# Patient Record
Sex: MALE | Race: WHITE
[De-identification: names, ages, dates, MRNs, and addresses within clinical notes are randomized per-mention and may not be internally consistent; named-entity substitution may affect disease eponyms.]

---

## 2017-02-21 ENCOUNTER — HOSPITAL ENCOUNTER (INPATIENT)
Dept: HOSPITAL 74 - YASAS | Age: 47
LOS: 4 days | Discharge: HOME | End: 2017-02-25
Attending: INTERNAL MEDICINE | Admitting: INTERNAL MEDICINE
Payer: COMMERCIAL

## 2017-02-21 VITALS — BODY MASS INDEX: 26.4 KG/M2

## 2017-02-21 DIAGNOSIS — R76.11: ICD-10-CM

## 2017-02-21 DIAGNOSIS — Z59.0: ICD-10-CM

## 2017-02-21 DIAGNOSIS — Z89.422: ICD-10-CM

## 2017-02-21 DIAGNOSIS — F10.230: Primary | ICD-10-CM

## 2017-02-21 PROCEDURE — HZ2ZZZZ DETOXIFICATION SERVICES FOR SUBSTANCE ABUSE TREATMENT: ICD-10-PCS | Performed by: INTERNAL MEDICINE

## 2017-02-21 RX ADMIN — Medication SCH MG: at 22:54

## 2017-02-21 SDOH — ECONOMIC STABILITY - HOUSING INSECURITY: HOMELESSNESS: Z59.0

## 2017-02-21 NOTE — HP
CIWA Score





- CIWA Score


Nausea/Vomitin-Mild Nausea/No Vomiting


Muscle Tremors: 4-Moderate,w/Arms Extend


Anxiety: 4-Mod. Anxious/Guarded


Agitation: 4-Moderately Restless


Paroxysmal Sweats: 3


Orientation: 2-Disoriented Date<2 days


Tacttile Disturbances: 0-None


Auditory Disturbances: 0-None


Visual Disturbances: 0-None


Headache: 2-Mild


CIWA-Ar Total Score: 20





Admission ROS [facility]





- HPI


Chief Complaint: 


WITHDRAWAL SX'S. SEEKING DETOX


Allergies/Adverse Reactions: 


 Allergies











Allergy/AdvReac Type Severity Reaction Status Date / Time


 


No Known Allergies Allergy   Verified 17 16:37











History of Present Illness: 


46 Y.O. MALE WITH ALCOHOLISM ADMITTED FOR DETOX TXMENT. CLIENT IS KNOWN TO 

[facility].  REPORTS LONGEST CLEAN TIME 10 YEARS.


Exam Limitations: No Limitations





- Ebola screening


Have you traveled outside of the country in the last 21 days: No


Have you had contact with anyone from an Ebola affected area: No


Have you been sick,other than usual withdrawal symptoms: No


Do you have a fever: No





- Review of Systems


Constitutional: Loss of Appetite, Malaise, Night Sweats, Unexplained wgt Loss


EENT: reports: No Symptoms Reported


Respiratory: reports: No Symptoms reported


Cardiac: reports: No Symptoms Reported


GI: reports: Nausea, Poor Appetite, Poor Fluid Intake


: reports: No Symptoms Reported


Musculoskeletal: reports: No Symptoms Reported


Integumentary: reports: No Symptoms Reported


Neuro: reports: Tremors


Endocrine: reports: No Symptoms Reported


Hematology: reports: No Symptoms Reported


Psychiatric: reports: Anxious


Other Systems: Reviewed and Negative





Patient History





- Patient Medical History


Hx Anemia: No


Hx Asthma: No


Hx Chronic Obstructive Pulmonary Disease (COPD): No


Hx Cancer: No


Hx Cardiac Disorders: No


Hx Congestive Heart Failure: No


Hx Hypertension: No


Hx Hypercholesterolemia: No


Hx Pacemaker: No


HX Cerebrovascular Accident: No


Hx Seizures: No


Hx Dementia: No


Hx Diabetes: No


Hx Gastrointestinal Disorders: No


Hx Liver Disease: No


Hx Genitourinary Disorders: No


Hx Sexually Transmitted Disorders: No


Hx Renal Disease (ESRD): No


Hx Thyroid Disease: No


Hx Human Immunodeficiency Virus (HIV): No


Hx Hepatitis C: No


Hx Depression: No


Hx Suicide Attempt: No


Hx Bipolar Disorder: No


Hx Schizophrenia: No


Other Medical History: DENIES





- Patient Surgical History


Past Surgical History: Yes


Hx Neurologic Surgery: No


Hx Cataract Extraction: No


Hx Cardiac Surgery: No


Hx Lung Surgery: No


Hx Breast Surgery: No


Hx Breast Biopsy: No


Hx Abdominal Surgery: No


Hx Appendectomy: No


Hx Cholecystectomy: No


Hx Genitourinary Surgery: No


Hx  Section: No


Hx Orthopedic Surgery: Yes (traumatic transmetatarsal amputation of left foot 

in  in [facility])


Hx Hysterectomy: No


Other Surgical History: h/o nasal and rt rib fx's in past 


Anesthesia Reaction: No





- PPD History


Previous Implant?: Yes


Documented Results: Positive w/o proof


Implanted On Prior [facility] Admission?: No


Results: positive


PPD to be Administered?: No





- Smoking Cessation


Smoking history: Never smoked


Have you smoked in the past 12 months: No


Aproximately how many cigarettes per day: 0


If you are a former smoker, when did you quit?: more than year ago


Cigars Per Day: 0


Hx Chewing Tobacco Use: No


Initiated information on smoking cessation: No





- Substance & Tx. History


Hx Alcohol Use: Yes


Hx Substance Use: No


Substance Use Type: Alcohol


Hx Substance Use Treatment: Yes ([facility])





- Substances Abused


  ** LIQUOR/BEER


Route: Oral


Frequency: Daily


Amount used: FIFTH/ 3-4 6 PACKS


Age of first use: 15


Date of Last Use: 18





Family Disease History





- Family Disease History


Family Disease History: Respiratory: Father (ALCOHOL in past), Other: Mother (

arthritis)





Admission Physical Exam [facility]





- Vital Signs


Vital Signs: 


 Vital Signs - 24 hr











  17





  13:13


 


Temperature 97.3 F L


 


Pulse Rate 111 H


 


Respiratory 20





Rate 


 


Blood Pressure 142/86














- Physical


General Appearance: Yes: Mild Distress, Alcohol on Breath, Intoxicated, 

Tremorous, Anxious


HEENTM: Yes: EOMI, Normocephalic, Normal Voice, SHANNON, Pharynx Normal


Respiratory: Yes: Lungs Clear, Normal Breath Sounds, No Respiratory Distress, 

No Accessory Muscle Use


Neck: Yes: No masses,lesions,Nodules, Supple, Trachea in good position


Breast: Yes: Breast Exam Deferred


Cardiology: Yes: Regular Rhythm, Regular Rate, S1, S2


Abdominal: Yes: Normal Bowel Sounds, Non Tender, Soft


Genitourinary: Yes: Within Normal Limits


Back: Yes: Normal Inspection


Musculoskeletal: Yes: full range of Motion, Gait Steady


Extremities: Yes: Normal Capillary Refill, Normal Range of Motion, Non-Tender, 

Tremors


Neurological: Yes: Alert, Motor Strength 5/5


Integumentary: Yes: Normal Color, Warm, Moist


Lymphatic: Yes: Within Normal Limits





- Diagnostic


(1) Positive PPD


Current Visit: Yes   Status: Chronic





(2) Alcohol dependence with uncomplicated withdrawal


Current Visit: Yes   Status: Chronic








Cleared for Admission [facility]





- Detox or Rehab


[facility] Level of Care: Medically Managed


Detox Regimen/Protocol: Librium





BHS Breath Alcohol Content


Breath Alcohol Content: 0.119





Urine Drug Screen





- Results


Drug Screen Negative: No


Urine Drug Screen Results: BZO-Benzodiazepines

## 2017-02-22 LAB
ALBUMIN SERPL-MCNC: 3.9 G/DL (ref 3.4–5)
ALP SERPL-CCNC: 66 U/L (ref 45–117)
ALT SERPL-CCNC: 54 U/L (ref 12–78)
ANION GAP SERPL CALC-SCNC: 8 MMOL/L (ref 8–16)
APPEARANCE UR: CLEAR
AST SERPL-CCNC: 35 U/L (ref 15–37)
BILIRUB SERPL-MCNC: 0.3 MG/DL (ref 0.2–1)
BILIRUB UR STRIP.AUTO-MCNC: NEGATIVE MG/DL
CALCIUM SERPL-MCNC: 8.8 MG/DL (ref 8.5–10.1)
CO2 SERPL-SCNC: 29 MMOL/L (ref 21–32)
COLOR UR: (no result)
CREAT SERPL-MCNC: 1.2 MG/DL (ref 0.7–1.3)
DEPRECATED RDW RBC AUTO: 13.9 % (ref 11.9–15.9)
GLUCOSE SERPL-MCNC: 83 MG/DL (ref 74–106)
KETONES UR QL STRIP: NEGATIVE
LEUKOCYTE ESTERASE UR QL STRIP.AUTO: NEGATIVE
MCH RBC QN AUTO: 29 PG (ref 25.7–33.7)
MCHC RBC AUTO-ENTMCNC: 33.3 G/DL (ref 32–35.9)
MCV RBC: 86.9 FL (ref 80–96)
NITRITE UR QL STRIP: NEGATIVE
PH UR: 6 [PH] (ref 5–8)
PLATELET # BLD AUTO: 232 K/MM3 (ref 134–434)
PMV BLD: 7.4 FL (ref 7.5–11.1)
PROT SERPL-MCNC: 7.9 G/DL (ref 6.4–8.2)
PROT UR QL STRIP: NEGATIVE
PROT UR QL STRIP: NEGATIVE
RBC # UR STRIP: NEGATIVE /UL
SP GR UR: 1.01 (ref 1–1.03)
UROBILINOGEN UR STRIP-MCNC: NEGATIVE E.U./DL (ref 0.2–1)
WBC # BLD AUTO: 5.2 K/MM3 (ref 4–10)

## 2017-02-22 RX ADMIN — LOPERAMIDE HYDROCHLORIDE PRN MG: 2 CAPSULE ORAL at 19:56

## 2017-02-22 RX ADMIN — HYDROXYZINE PAMOATE PRN MG: 50 CAPSULE ORAL at 00:31

## 2017-02-22 RX ADMIN — LOPERAMIDE HYDROCHLORIDE PRN MG: 2 CAPSULE ORAL at 13:05

## 2017-02-22 RX ADMIN — HYDROXYZINE PAMOATE PRN MG: 50 CAPSULE ORAL at 11:59

## 2017-02-22 RX ADMIN — Medication SCH TAB: at 10:05

## 2017-02-22 NOTE — CONSULT
[facility] Psychiatric Consult





- Data


Date of interview: 02/22/17


Admission source: [facility]


Identifying data: This is another admission to [facility] for this 47 y/o 

 male seeking detox treatment on [address] for alcohol dependence.Patient 

is single,a father of one,domiciled,unemployed and supported on Public 

Assistance.


Substance Abuse History: - Smoking Cessation.  Smoking history: Never smoked.  

Have you smoked in the past 12 months: No.  Aproximately how many cigarettes 

per day: 0.  If you are a former smoker, when did you quit?: more than year 

ago.  Cigars Per Day: 0.  Hx Chewing Tobacco Use: No.  Initiated information on 

smoking cessation: No.  - Substance & Tx. History.  Hx Alcohol Use: Yes.  Hx 

Substance Use: No.  Substance Use Type: Alcohol.  Hx Substance Use Treatment: 

Yes ([facility]).  - Substances Abused.  ** LIQUOR/BEER.  Route: Oral.  Frequency: 

Daily.  Amount used: FIFTH/ 3-4 6 PACKS.  Age of first use: 15.  Date of Last 

Use: 02/21/17.  Confirmed by the patient in this interview.


Medical History: Patient endorses good general health.Noted history of 

orthopedic surgery (traumatic transmetatarsal amputation of left foot in 1991).


Psychiatric History: No history of psychiatric hospitalizations.Only CPEP 

visits for intoxicated states (alcohol) and release after a few hours of 

observation.Patient denies taking any medications at this time.No contact with 

any OPD care providers.Mr Rios denies history of suicide attempts.


Physical/Sexual Abuse/Trauma History: No history of sexual abuse.


Additional Comment: Urine Drug Screen Results: BZO-Benzodiazepines.Noted.





Mental Status Exam





- Mental Status Exam


Alert and Oriented to: Time, Place, Person


Cognitive Function: Good


Patient Appearance: Well Groomed


Mood: Hopeful, Euthymic


Affect: Appropriate, Normal Range


Patient Behavior: Appropriate, Cooperative


Speech Pattern: Clear


Voice Loudness: Normal


Thought Process: Intact


Thought Disorder: Not Present


Hallucinations: None


Suicidal Ideation: None


Homicidal Ideation: None


Insight/Judgement: Fair


Sleep: Well


Appetite: Good


Muscle strength/Tone: Normal


Gait/Station: Normal





Psychiatric Findings





- Problem List (Axis 1, 2,3)


(1) Alcohol dependence with uncomplicated withdrawal


Current Visit: Yes   Status: Acute








- Initial Treatment Plan


Initial Treatment Plan: Psychoeducation.Detoxification.Observation.

## 2017-02-22 NOTE — EKG
Test Reason : 

Blood Pressure : ***/*** mmHG

Vent. Rate : 089 BPM     Atrial Rate : 089 BPM

   P-R Int : 136 ms          QRS Dur : 104 ms

    QT Int : 356 ms       P-R-T Axes : 065 069 040 degrees

   QTc Int : 433 ms

 

NORMAL SINUS RHYTHM

NORMAL ECG

NO PREVIOUS ECGS AVAILABLE

Confirmed by RITCHIE HANCOCK MD ([id number]) on 2/22/2017 4:11:23 PM

 

Referred By:             Confirmed By:RITCHIE HANCOCK MD

## 2017-02-22 NOTE — PN
S CIWA





- CIWA Score


Nausea/Vomitin-No Nausea/No Vomiting


Muscle Tremors: 4-Moderate,w/Arms Extend


Anxiety: 4-Mod. Anxious/Guarded


Agitation: 3


Paroxysmal Sweats: 3


Orientation: 0-Oriented


Tacttile Disturbances: 0-None


Auditory Disturbances: 0-None


Visual Disturbances: 0-None


Headache: 0-None Present


CIWA-Ar Total Score: 14





BHS Progress Note (SOAP)


Subjective: 


Sweating,anxiety,tremors,interrupted sleep,restless


Objective: 





17 17:17


 Vital Signs - 8 hr











  17





  13:37 17:13


 


Temperature 97.3 F L 98.1 F


 


Pulse Rate 95 H 74


 


Respiratory 20 18





Rate  


 


Blood Pressure 138/83 127/75








 Laboratory Tests











  17





  07:50 07:50 07:50


 


WBC  5.2  


 


RBC  4.76  


 


Hgb  13.8  


 


Hct  41.4  


 


MCV  86.9  


 


MCHC  33.3  


 


RDW  13.9  


 


Plt Count  232  


 


MPV  7.4 L D  


 


Sodium   144 


 


Potassium   4.0 


 


Chloride   107 


 


Carbon Dioxide   29 


 


Anion Gap   8 


 


BUN   11  D 


 


Creatinine   1.2 


 


Creat Clearance w eGFR   > 60 


 


Random Glucose   83 


 


Calcium   8.8 


 


Total Bilirubin   0.3  D 


 


AST   35 


 


ALT   54 


 


Alkaline Phosphatase   66 


 


Total Protein   7.9 


 


Albumin   3.9 


 


RPR Titer    Nonreactive








labs noted


Assessment: 





17 17:17


Withdrawal sx.


Plan: 


Continue detox

## 2017-02-23 RX ADMIN — LOPERAMIDE HYDROCHLORIDE PRN MG: 2 CAPSULE ORAL at 17:31

## 2017-02-23 RX ADMIN — HYDROXYZINE PAMOATE PRN MG: 50 CAPSULE ORAL at 22:04

## 2017-02-23 RX ADMIN — LOPERAMIDE HYDROCHLORIDE PRN MG: 2 CAPSULE ORAL at 05:33

## 2017-02-23 RX ADMIN — Medication SCH MG: at 22:01

## 2017-02-23 RX ADMIN — Medication SCH TAB: at 10:43

## 2017-02-23 NOTE — PN
S CIWA





- CIWA Score


Nausea/Vomitin-No Nausea/No Vomiting


Muscle Tremors: 4-Moderate,w/Arms Extend


Anxiety: 4-Mod. Anxious/Guarded


Agitation: 3


Paroxysmal Sweats: 3


Orientation: 0-Oriented


Tacttile Disturbances: 1-Very Mild Itch/Numbness


Auditory Disturbances: 0-None


Visual Disturbances: 0-None


Headache: 0-None Present


CIWA-Ar Total Score: 15





BHS Progress Note (SOAP)


Subjective: 


Sweating,nausea,anxiety,tremors,interrupted sleep,restless.


Objective: 





17 12:39


 Vital Signs - 8 hr











  17





  06:41 09:44


 


Temperature 98.2 F 96 F L


 


Pulse Rate 64 85


 


Respiratory 16 18





Rate  


 


Blood Pressure 112/71 132/89








 Laboratory Tests











  17





  07:50 07:50 07:50


 


WBC  5.2  


 


RBC  4.76  


 


Hgb  13.8  


 


Hct  41.4  


 


MCV  86.9  


 


MCHC  33.3  


 


RDW  13.9  


 


Plt Count  232  


 


MPV  7.4 L D  


 


Sodium   144 


 


Potassium   4.0 


 


Chloride   107 


 


Carbon Dioxide   29 


 


Anion Gap   8 


 


BUN   11  D 


 


Creatinine   1.2 


 


Creat Clearance w eGFR   > 60 


 


Random Glucose   83 


 


Calcium   8.8 


 


Total Bilirubin   0.3  D 


 


AST   35 


 


ALT   54 


 


Alkaline Phosphatase   66 


 


Total Protein   7.9 


 


Albumin   3.9 


 


Urine Color   


 


Urine Appearance   


 


Urine pH   


 


Ur Specific Gravity   


 


Urine Protein   


 


Urine Glucose (UA)   


 


Urine Ketones   


 


Urine Blood   


 


Urine Nitrite   


 


Urine Bilirubin   


 


Urine Urobilinogen   


 


Ur Leukocyte Esterase   


 


RPR Titer    Nonreactive














  17





  16:30


 


WBC 


 


RBC 


 


Hgb 


 


Hct 


 


MCV 


 


MCHC 


 


RDW 


 


Plt Count 


 


MPV 


 


Sodium 


 


Potassium 


 


Chloride 


 


Carbon Dioxide 


 


Anion Gap 


 


BUN 


 


Creatinine 


 


Creat Clearance w eGFR 


 


Random Glucose 


 


Calcium 


 


Total Bilirubin 


 


AST 


 


ALT 


 


Alkaline Phosphatase 


 


Total Protein 


 


Albumin 


 


Urine Color  Ltyellow


 


Urine Appearance  Clear


 


Urine pH  6.0


 


Ur Specific Gravity  1.012


 


Urine Protein  Negative


 


Urine Glucose (UA)  Negative


 


Urine Ketones  Negative


 


Urine Blood  Negative


 


Urine Nitrite  Negative


 


Urine Bilirubin  Negative


 


Urine Urobilinogen  Negative


 


Ur Leukocyte Esterase  Negative


 


RPR Titer 








labs noted


Assessment: 





17 12:39


Withdrawal sx.


Plan: 


Continue detox

## 2017-02-24 RX ADMIN — Medication SCH MG: at 22:21

## 2017-02-24 RX ADMIN — Medication SCH TAB: at 10:38

## 2017-02-24 RX ADMIN — LOPERAMIDE HYDROCHLORIDE PRN MG: 2 CAPSULE ORAL at 10:38

## 2017-02-24 NOTE — PN
BHS Progress Note (SOAP)


Subjective: 


Sweating,interrupted sleep,restless


Objective: 





02/24/17 10:57


 Vital Signs - 8 hr











  02/24/17 02/24/17 02/24/17





  03:30 06:32 10:35


 


Temperature  96.8 F L 95.8 F L


 


Pulse Rate  78 82


 


Respiratory 18 18 20





Rate   


 


Blood Pressure  136/88 141/93








 Laboratory Tests











  02/22/17 02/22/17 02/22/17





  07:50 07:50 07:50


 


WBC  5.2  


 


RBC  4.76  


 


Hgb  13.8  


 


Hct  41.4  


 


MCV  86.9  


 


MCHC  33.3  


 


RDW  13.9  


 


Plt Count  232  


 


MPV  7.4 L D  


 


Sodium   144 


 


Potassium   4.0 


 


Chloride   107 


 


Carbon Dioxide   29 


 


Anion Gap   8 


 


BUN   11  D 


 


Creatinine   1.2 


 


Creat Clearance w eGFR   > 60 


 


Random Glucose   83 


 


Calcium   8.8 


 


Total Bilirubin   0.3  D 


 


AST   35 


 


ALT   54 


 


Alkaline Phosphatase   66 


 


Total Protein   7.9 


 


Albumin   3.9 


 


Urine Color   


 


Urine Appearance   


 


Urine pH   


 


Ur Specific Gravity   


 


Urine Protein   


 


Urine Glucose (UA)   


 


Urine Ketones   


 


Urine Blood   


 


Urine Nitrite   


 


Urine Bilirubin   


 


Urine Urobilinogen   


 


Ur Leukocyte Esterase   


 


RPR Titer    Nonreactive














  02/22/17





  16:30


 


WBC 


 


RBC 


 


Hgb 


 


Hct 


 


MCV 


 


MCHC 


 


RDW 


 


Plt Count 


 


MPV 


 


Sodium 


 


Potassium 


 


Chloride 


 


Carbon Dioxide 


 


Anion Gap 


 


BUN 


 


Creatinine 


 


Creat Clearance w eGFR 


 


Random Glucose 


 


Calcium 


 


Total Bilirubin 


 


AST 


 


ALT 


 


Alkaline Phosphatase 


 


Total Protein 


 


Albumin 


 


Urine Color  Ltyellow


 


Urine Appearance  Clear


 


Urine pH  6.0


 


Ur Specific Gravity  1.012


 


Urine Protein  Negative


 


Urine Glucose (UA)  Negative


 


Urine Ketones  Negative


 


Urine Blood  Negative


 


Urine Nitrite  Negative


 


Urine Bilirubin  Negative


 


Urine Urobilinogen  Negative


 


Ur Leukocyte Esterase  Negative


 


RPR Titer 








labs noted


Assessment: 





02/24/17 10:58


Withdrawal sx.


Plan: 


Continue detox

## 2017-02-25 VITALS — DIASTOLIC BLOOD PRESSURE: 69 MMHG | SYSTOLIC BLOOD PRESSURE: 106 MMHG | TEMPERATURE: 97.2 F | HEART RATE: 80 BPM

## 2017-02-25 NOTE — DS
[facility]HS Detox Discharge Summary


Admission Date: 


02/21/17





Discharge Date: 02/25/17





- History


Present History: Alcohol Dependence


Pertinent Past History: 


PPD +





- Physical Exam Results


Vital Signs: 


 Vital Signs











Temperature  97.2 F L  02/25/17 06:45


 


Pulse Rate  80   02/25/17 06:45


 


Respiratory Rate  18   02/25/17 06:45


 


Blood Pressure  106/69   02/25/17 06:45


 


O2 Sat by Pulse Oximetry (%)      








 Laboratory Last Values











WBC  5.2 K/mm3 (4.0-10.0)   02/22/17  07:50    


 


RBC  4.76 M/mm3 (4.00-5.60)   02/22/17  07:50    


 


Hgb  13.8 GM/dL (11.7-16.9)   02/22/17  07:50    


 


Hct  41.4 % (35.4-49)   02/22/17  07:50    


 


MCV  86.9 fl (80-96)   02/22/17  07:50    


 


MCHC  33.3 g/dl (32.0-35.9)   02/22/17  07:50    


 


RDW  13.9 % (11.9-15.9)   02/22/17  07:50    


 


Plt Count  232 K/MM3 (134-434)   02/22/17  07:50    


 


MPV  7.4 fl (7.5-11.1)  L D 02/22/17  07:50    


 


Sodium  144 mmol/L (136-145)   02/22/17  07:50    


 


Potassium  4.0 mmol/L (3.5-5.1)   02/22/17  07:50    


 


Chloride  107 mmol/L ()   02/22/17  07:50    


 


Carbon Dioxide  29 mmol/L (21-32)   02/22/17  07:50    


 


Anion Gap  8  (8-16)   02/22/17  07:50    


 


BUN  11 mg/dL (7-18)  D 02/22/17  07:50    


 


Creatinine  1.2 mg/dL (0.7-1.3)   02/22/17  07:50    


 


Creat Clearance w eGFR  > 60  (>60)   02/22/17  07:50    


 


Random Glucose  83 mg/dL ()   02/22/17  07:50    


 


Calcium  8.8 mg/dL (8.5-10.1)   02/22/17  07:50    


 


Total Bilirubin  0.3 mg/dL (0.2-1.0)  D 02/22/17  07:50    


 


AST  35 U/L (15-37)   02/22/17  07:50    


 


ALT  54 U/L (12-78)   02/22/17  07:50    


 


Alkaline Phosphatase  66 U/L ()   02/22/17  07:50    


 


Total Protein  7.9 g/dl (6.4-8.2)   02/22/17  07:50    


 


Albumin  3.9 g/dl (3.4-5.0)   02/22/17  07:50    


 


Urine Color  Ltyellow   02/22/17  16:30    


 


Urine Appearance  Clear   02/22/17  16:30    


 


Urine pH  6.0  (5.0-8.0)   02/22/17  16:30    


 


Ur Specific Gravity  1.012  (1.001-1.035)   02/22/17  16:30    


 


Urine Protein  Negative  (NEGATIVE)   02/22/17  16:30    


 


Urine Glucose (UA)  Negative  (NEGATIVE)   02/22/17  16:30    


 


Urine Ketones  Negative  (NEGATIVE)   02/22/17  16:30    


 


Urine Blood  Negative  (NEGATIVE)   02/22/17  16:30    


 


Urine Nitrite  Negative  (NEGATIVE)   02/22/17  16:30    


 


Urine Bilirubin  Negative  (NEGATIVE)   02/22/17  16:30    


 


Urine Urobilinogen  Negative E.U./dl (0.2-1.0)   02/22/17  16:30    


 


Ur Leukocyte Esterase  Negative  (NEGATIVE)   02/22/17  16:30    


 


RPR Titer  Nonreactive  (NONREACTIVE)   02/22/17  07:50    








labs noted


Pertinent Admission Physical Exam Findings: 


withdrawal symptoms





- Treatment


Hospital Course: Detox Protocol Followed, Detoxed Safely, Responded well, 

Discharged Condition Good





- Medication


Discharge Medications: 


Ambulatory Orders





NK [No Known Home Medication]  08/03/15 











- Diagnosis


(1) Alcohol dependence with uncomplicated withdrawal


Status: Acute





(2) Positive PPD


Status: Chronic








- AMA


Did Patient Leave Against Medical Advice: No

## 2017-05-04 ENCOUNTER — HOSPITAL ENCOUNTER (INPATIENT)
Dept: HOSPITAL 74 - YASAS | Age: 47
LOS: 4 days | Discharge: HOME | End: 2017-05-08
Attending: INTERNAL MEDICINE | Admitting: INTERNAL MEDICINE
Payer: COMMERCIAL

## 2017-05-04 VITALS — BODY MASS INDEX: 25.8 KG/M2

## 2017-05-04 DIAGNOSIS — F19.24: ICD-10-CM

## 2017-05-04 DIAGNOSIS — R55: ICD-10-CM

## 2017-05-04 DIAGNOSIS — R63.4: ICD-10-CM

## 2017-05-04 DIAGNOSIS — F14.10: ICD-10-CM

## 2017-05-04 DIAGNOSIS — F10.230: Primary | ICD-10-CM

## 2017-05-04 DIAGNOSIS — F17.210: ICD-10-CM

## 2017-05-04 DIAGNOSIS — Z89.432: ICD-10-CM

## 2017-05-04 DIAGNOSIS — F39: ICD-10-CM

## 2017-05-04 DIAGNOSIS — F13.230: ICD-10-CM

## 2017-05-04 DIAGNOSIS — F32.9: ICD-10-CM

## 2017-05-04 DIAGNOSIS — F31.9: ICD-10-CM

## 2017-05-04 DIAGNOSIS — R76.11: ICD-10-CM

## 2017-05-04 LAB
APPEARANCE UR: CLEAR
BILIRUB UR STRIP.AUTO-MCNC: NEGATIVE MG/DL
COLOR UR: (no result)
KETONES UR QL STRIP: NEGATIVE
LEUKOCYTE ESTERASE UR QL STRIP.AUTO: NEGATIVE
NITRITE UR QL STRIP: NEGATIVE
PH UR: 6 [PH] (ref 5–8)
PROT UR QL STRIP: NEGATIVE
PROT UR QL STRIP: NEGATIVE
RBC # UR STRIP: NEGATIVE /UL
UROBILINOGEN UR STRIP-MCNC: NEGATIVE E.U./DL (ref 0.2–1)

## 2017-05-04 RX ADMIN — Medication SCH MG: at 22:44

## 2017-05-04 NOTE — HP
CIWA Score





- CIWA Score


Nausea/Vomitin-Mild Nausea/No Vomiting


Muscle Tremors: 4-Moderate,w/Arms Extend


Anxiety: 4-Mod. Anxious/Guarded


Agitation: 4-Moderately Restless


Paroxysmal Sweats: 1-Minimal Palms Moist


Orientation: 1-Uncertain about Date


Tacttile Disturbances: 0-None


Auditory Disturbances: 0-None


Visual Disturbances: 0-None


Headache: 0-None Present


CIWA-Ar Total Score: 15





Admission ROS [facility]





- HPI


Chief Complaint: 


withdrawal sx


Allergies/Adverse Reactions: 


 Allergies











Allergy/AdvReac Type Severity Reaction Status Date / Time


 


No Known Allergies Allergy   Verified 17 16:37











History of Present Illness: 


46 years old male with long history of alcohol dependence has positive ppd and 

bipolar ii is admitted to detox


Exam Limitations: No Limitations





- Ebola screening


Have you traveled outside of the country in the last 21 days: No


Have you had contact with anyone from an Ebola affected area: No


Have you been sick,other than usual withdrawal symptoms: No


Do you have a fever: No





- Review of Systems


Constitutional: Chills, Changes in sleep, Weight Stable


EENT: reports: No Symptoms Reported


Respiratory: reports: No Symptoms reported


Cardiac: reports: No Symptoms Reported


GI: reports: Poor Fluid Intake, Abdominal cramping


: reports: No Symptoms Reported


Musculoskeletal: reports: No Symptoms Reported


Integumentary: reports: No Symptoms Reported


Neuro: reports: Tremors


Endocrine: reports: No Symptoms Reported


Hematology: reports: No Symptoms Reported


Psychiatric: reports: Judgement Intact, Anxious, Depressed


Other Systems: Reviewed and Negative





Patient History





- Patient Medical History


Hx Anemia: No


Hx Asthma: No


Hx Chronic Obstructive Pulmonary Disease (COPD): No


Hx Cancer: No


Hx Cardiac Disorders: No


Hx Congestive Heart Failure: No


Hx Hypertension: No


Hx Hypercholesterolemia: No


Hx Pacemaker: No


HX Cerebrovascular Accident: No


Hx Seizures: No


Hx Dementia: No


Hx Diabetes: No


Hx Gastrointestinal Disorders: No


Hx Liver Disease: No


Hx Genitourinary Disorders: No


Hx Sexually Transmitted Disorders: No


Hx Renal Disease (ESRD): No


Hx Thyroid Disease: No


Hx Human Immunodeficiency Virus (HIV): No


Hx Hepatitis C: No


Hx Depression: No


Hx Suicide Attempt: No


Hx Bipolar Disorder: Yes


Hx Schizophrenia: No





- Patient Surgical History


Past Surgical History: Yes


Hx Neurologic Surgery: No


Hx Cataract Extraction: No


Hx Cardiac Surgery: No


Hx Lung Surgery: No


Hx Breast Surgery: No


Hx Breast Biopsy: No


Hx Abdominal Surgery: No


Hx Appendectomy: No


Hx Cholecystectomy: No


Hx Genitourinary Surgery: No


Hx Orthopedic Surgery: Yes (traumatic transmetatarsal amputation of left foot 

in  in [facility])


Other Surgical History: h/o nasal and rt rib fx's in past 


Anesthesia Reaction: No





- PPD History


Previous Implant?: Yes


Documented Results: Positive w/o proof


Implanted On Prior R Admission?: No


Results: positive


PPD to be Administered?: No





- Smoking Cessation


Smoking history: Never smoked


Have you smoked in the past 12 months: No


Aproximately how many cigarettes per day: 0


If you are a former smoker, when did you quit?: more than year ago


Cigars Per Day: 0


Hx Chewing Tobacco Use: No


Initiated information on smoking cessation: No





- Substance & Tx. History


Hx Alcohol Use: Yes


Hx Substance Use: No


Substance Use Type: Alcohol


Hx Substance Use Treatment: Yes





- Substances Abused


  ** Alcohol


Route: Oral


Frequency: Daily


Amount used: 2 pints vodka/ 2 6pks beer and up


Age of first use: 15


Date of Last Use: 18





Family Disease History





- Family Disease History


Family Disease History: Respiratory: Father (ALCOHOL in past), Other: Mother (

arthritis)





Admission Physical Exam [facility]





- Vital Signs


Vital Signs: 


 Vital Signs - 24 hr











  17





  12:01


 


Temperature 96.8 F L


 


Pulse Rate 112 H


 


Respiratory 20





Rate 


 


Blood Pressure 135/90














- Physical


General Appearance: Yes: Nourished, Appropriately Dressed, Mild Distress (

patient took benzo at home to avoid alcohol withdrawal sx), Alcohol on Breath, 

Tremorous, Irritable, Sweating, Anxious


HEENTM: Yes: Hearing grossly Normal, Normal ENT Inspection, Normocephalic, 

Normal Voice


Respiratory: Yes: Chest Non-Tender, Lungs Clear, Normal Breath Sounds, No 

Respiratory Distress, No Accessory Muscle Use


Neck: Yes: Supple, Trachea in good position


Cardiology: Yes: Regular Rhythm, S1, S2, Tachycardia


Abdominal: Yes: Non Tender, Soft


Genitourinary: Yes: Within Normal Limits


Back: Yes: Normal Inspection


Extremities: Yes: Normal Inspection, Normal Range of Motion, Non-Tender, Tremors


Neurological: Yes: Alert, Motor Strength 5/5, Normal Response, Depressed Affect


Integumentary: Yes: Warm


Lymphatic: Yes: Within Normal Limits





- Diagnostic


(1) Alcohol dependence with uncomplicated withdrawal


Current Visit: Yes   Status: Acute





(2) S/P transmetatarsal amputation of foot


Current Visit: Yes   Status: Resolved   Qualifiers: 


     Laterality: left        Qualified Code(s): Z89.432 - Acquired absence of 

left foot  





(3) Positive PPD


Current Visit: Yes   Status: Resolved





(4) Bipolar II disorder


Current Visit: Yes   Status: Suspected








Cleared for Admission [facility]





- Detox or Rehab


[facility] Level of Care: Medically Managed


Detox Regimen/Protocol: Librium





[facility]HS Breath Alcohol Content


Breath Alcohol Content: 0.154





Urine Drug Screen





- Results


Drug Screen Negative: No


Urine Drug Screen Results: BZO-Benzodiazepines

## 2017-05-05 LAB
ALBUMIN SERPL-MCNC: 4.2 G/DL (ref 3.4–5)
ALP SERPL-CCNC: 84 U/L (ref 45–117)
ALT SERPL-CCNC: 88 U/L (ref 12–78)
ANION GAP SERPL CALC-SCNC: 12 MMOL/L (ref 8–16)
AST SERPL-CCNC: 58 U/L (ref 15–37)
BILIRUB SERPL-MCNC: 0.5 MG/DL (ref 0.2–1)
CALCIUM SERPL-MCNC: 9.6 MG/DL (ref 8.5–10.1)
CO2 SERPL-SCNC: 25 MMOL/L (ref 21–32)
COCKROFT - GAULT: 92.49
CREAT SERPL-MCNC: 1.1 MG/DL (ref 0.7–1.3)
DEPRECATED RDW RBC AUTO: 14.2 % (ref 11.9–15.9)
GLUCOSE SERPL-MCNC: 118 MG/DL (ref 74–106)
HIV 1 & 2 AB: NEGATIVE
HIV 1 AGP24: NEGATIVE
MCH RBC QN AUTO: 29.3 PG (ref 25.7–33.7)
MCHC RBC AUTO-ENTMCNC: 33.9 G/DL (ref 32–35.9)
MCV RBC: 86.4 FL (ref 80–96)
PLATELET # BLD AUTO: 253 K/MM3 (ref 134–434)
PMV BLD: 8.3 FL (ref 7.5–11.1)
PROT SERPL-MCNC: 8.3 G/DL (ref 6.4–8.2)
SP GR UR: 1.01 (ref 1–1.03)
WBC # BLD AUTO: 5.9 K/MM3 (ref 4–10)

## 2017-05-05 RX ADMIN — LOPERAMIDE HYDROCHLORIDE PRN MG: 2 CAPSULE ORAL at 22:59

## 2017-05-05 RX ADMIN — Medication SCH MG: at 22:59

## 2017-05-05 RX ADMIN — Medication SCH TAB: at 10:17

## 2017-05-05 RX ADMIN — LOPERAMIDE HYDROCHLORIDE PRN MG: 2 CAPSULE ORAL at 15:29

## 2017-05-05 NOTE — EKG
Test Reason : 

Blood Pressure : ***/*** mmHG

Vent. Rate : 114 BPM     Atrial Rate : 114 BPM

   P-R Int : 122 ms          QRS Dur : 090 ms

    QT Int : 322 ms       P-R-T Axes : 063 092 037 degrees

   QTc Int : 443 ms

 

SINUS TACHYCARDIA

RIGHTWARD AXIS

WHEN COMPARED WITH ECG OF 04-MAY-2017 17:10,

NO SIGNIFICANT CHANGE WAS FOUND

Confirmed by HALEY CLARKE MD ([id number]) on 5/5/2017 11:42:18 AM

 

Referred By:             Confirmed By:HALEY CLARKE MD

## 2017-05-05 NOTE — CONSULT
[facility] Psychiatric Consult





- Data


Date of interview: 05/05/17


Admission source: [facility]


Identifying data: Readmission to [facility] for this 47 y/o [place] citizen-born male 

seeking detox treatment,on [address],for alcohol dependence.Patient is ,

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


Substance Abuse History: - Smoking Cessation.  Smoking history: Never smoked.  

Have you smoked in the past 12 months: No.  Aproximately how many cigarettes 

per day: 0.  If you are a former smoker, when did you quit?: more than year 

ago.  Cigars Per Day: 0.  Hx Chewing Tobacco Use: No.  Initiated information on 

smoking cessation: No.  - Substance & Tx. History.  Hx Alcohol Use: Yes.  Hx 

Substance Use: No.  Substance Use Type: Alcohol.  Hx Substance Use Treatment: 

Yes.  - Substances Abused.  ** Alcohol.  Route: Oral.  Frequency: Daily.  

Amount used: 2 pints vodka/ 2 6pks beer and up.  Age of first use: 15.  Date of 

Last Use: 05/04/17.  Confirmed by patient.


Medical History: Patient endorses good general health.Noted history of 

orthopedic surgery (traumatic transmetatarsal amputation of left foot in 1991).


Psychiatric History: No changes in longitudinal history : never hospitalized in 

a psychiatric institution.Only CPEP visits for intoxicated states (alcohol) and 

release after a few hours of observation.No maintenance care with psychotropic 

medications.No contact with OPD care providers.Mr Rios denies history of 

suicide attempts.


Physical/Sexual Abuse/Trauma History: Patient denies.


Additional Comment: Urine Drug Screen Results: BZO-Benzodiazepines.Noted.





Mental Status Exam





- Mental Status Exam


Alert and Oriented to: Time, Place, Person


Cognitive Function: Good


Patient Appearance: Well Groomed


Mood: Hopeful, Euthymic


Affect: Appropriate, Normal Range


Patient Behavior: Fatigued, Appropriate, Cooperative


Speech Pattern: Clear, Appropriate


Voice Loudness: Normal


Thought Process: Intact, Goal Oriented


Thought Disorder: Not Present


Hallucinations: Denies


Suicidal Ideation: Denies


Homicidal Ideation: Denies


Insight/Judgement: Poor


Sleep: Well


Appetite: Good


Muscle strength/Tone: Normal


Gait/Station: Normal





Psychiatric Findings





- Problem List (Axis 1, 2,3)


(1) Alcohol dependence with uncomplicated withdrawal


Current Visit: Yes   Status: Acute





(2) Alcohol-induced mood disorder


Current Visit: Yes   Status: Acute





(3) Positive PPD


Current Visit: Yes   Status: Resolved





(4) Status post transmetatarsal amputation of left foot


Current Visit: No   Status: Chronic








- Initial Treatment Plan


Initial Treatment Plan: Psychoeducation.Detoxification.Observation.

## 2017-05-05 NOTE — PN
S CIWA





- CIWA Score


Nausea/Vomitin-No Nausea/No Vomiting


Muscle Tremors: 3


Anxiety: 3


Agitation: 3


Paroxysmal Sweats: 3


Orientation: 0-Oriented


Tacttile Disturbances: 0-None


Auditory Disturbances: 0-None


Visual Disturbances: 0-None


Headache: 1-Very Mild


CIWA-Ar Total Score: 13





BHS Progress Note (SOAP)


Subjective: 


anxious


sweats


interrupted sleep


headache


Objective: 





17 10:46


 Vital Signs











Temperature  97.7 F   17 10:46


 


Pulse Rate  93 H  17 10:46


 


Respiratory Rate  20   17 10:46


 


Blood Pressure  132/87   17 10:46


 


O2 Sat by Pulse Oximetry (%)      








 Laboratory Tests











  17





  22:55 06:00


 


WBC   5.9


 


RBC   4.66


 


Hgb   13.7


 


Hct   40.3


 


MCV   86.4


 


MCHC   33.9


 


RDW   14.2


 


Plt Count   253


 


MPV   8.3  D


 


Urine Color  Ltyellow 


 


Urine Appearance  Clear 


 


Urine pH  6.0 


 


Urine Protein  Negative 


 


Urine Glucose (UA)  Negative 


 


Urine Ketones  Negative 


 


Urine Blood  Negative 


 


Urine Nitrite  Negative 


 


Urine Bilirubin  Negative 


 


Urine Urobilinogen  Negative 


 


Ur Leukocyte Esterase  Negative 








labs pending


awake/alert


ambulating


no acute distress


Assessment: 





17 10:47


withdrawal sx


Plan: 


continue detox


increase fluids


labs pending

## 2017-05-05 NOTE — EKG
Test Reason : 

Blood Pressure : ***/*** mmHG

Vent. Rate : 123 BPM     Atrial Rate : 123 BPM

   P-R Int : 126 ms          QRS Dur : 090 ms

    QT Int : 314 ms       P-R-T Axes : 065 105 029 degrees

   QTc Int : 449 ms

 

*** POOR DATA QUALITY, INTERPRETATION MAY BE ADVERSELY AFFECTED

SINUS TACHYCARDIA

RIGHTWARD AXIS

WHEN COMPARED WITH ECG OF 04-MAY-2017 17:10,

NO SIGNIFICANT CHANGE WAS FOUND

Confirmed by HALEY CLARKE MD ([id number]) on 5/5/2017 11:42:38 AM

 

Referred By:             Confirmed By:HALEY CLARKE MD

## 2017-05-06 RX ADMIN — Medication SCH TAB: at 10:20

## 2017-05-06 RX ADMIN — LOPERAMIDE HYDROCHLORIDE PRN MG: 2 CAPSULE ORAL at 05:53

## 2017-05-06 RX ADMIN — Medication SCH MG: at 22:10

## 2017-05-06 RX ADMIN — LOPERAMIDE HYDROCHLORIDE PRN MG: 2 CAPSULE ORAL at 17:35

## 2017-05-06 NOTE — PN
BHS Progress Note (SOAP)


Subjective: 


Diarrhea, cramps, shakes and sweats


Objective: 


05/06/17 13:21


 Vital Signs - 8 hr











  05/06/17 05/06/17





  06:00 10:47


 


Temperature 97.9 F 97.5 F L


 


Pulse Rate 74 85


 


Respiratory 18 18





Rate  


 


Blood Pressure 121/66 141/88








 Laboratory Last Values











WBC  5.9 K/mm3 (4.0-10.0)   05/05/17  06:00    


 


RBC  4.66 M/mm3 (4.00-5.60)   05/05/17  06:00    


 


Hgb  13.7 GM/dL (11.7-16.9)   05/05/17  06:00    


 


Hct  40.3 % (35.4-49)   05/05/17  06:00    


 


MCV  86.4 fl (80-96)   05/05/17  06:00    


 


MCHC  33.9 g/dl (32.0-35.9)   05/05/17  06:00    


 


RDW  14.2 % (11.9-15.9)   05/05/17  06:00    


 


Plt Count  253 K/MM3 (134-434)   05/05/17  06:00    


 


MPV  8.3 fl (7.5-11.1)  D 05/05/17  06:00    


 


Sodium  138 mmol/L (136-145)   05/05/17  06:00    


 


Potassium  5.5 mmol/L (3.5-5.1)  H D 05/05/17  06:00    


 


Chloride  101 mmol/L ()   05/05/17  06:00    


 


Carbon Dioxide  25 mmol/L (21-32)   05/05/17  06:00    


 


Anion Gap  12  (8-16)   05/05/17  06:00    


 


BUN  13 mg/dL (7-18)   05/05/17  06:00    


 


Creatinine  1.1 mg/dL (0.7-1.3)   05/05/17  06:00    


 


Creat Clearance w eGFR  > 60  (>60)   05/05/17  06:00    


 


Random Glucose  118 mg/dL ()  H D 05/05/17  06:00    


 


Calcium  9.6 mg/dL (8.5-10.1)   05/05/17  06:00    


 


Total Bilirubin  0.5 mg/dL (0.2-1.0)  D 05/05/17  06:00    


 


AST  58 U/L (15-37)  H D 05/05/17  06:00    


 


ALT  88 U/L (12-78)  H D 05/05/17  06:00    


 


Alkaline Phosphatase  84 U/L ()  D 05/05/17  06:00    


 


Total Protein  8.3 g/dl (6.4-8.2)  H  05/05/17  06:00    


 


Albumin  4.2 g/dl (3.4-5.0)   05/05/17  06:00    


 


Urine Color  Ltyellow   05/04/17  22:55    


 


Urine Appearance  Clear   05/04/17  22:55    


 


Urine pH  6.0  (5.0-8.0)   05/04/17  22:55    


 


Ur Specific Gravity  1.010  (1.001-1.035)   05/04/17  22:55    


 


Urine Protein  Negative  (NEGATIVE)   05/04/17  22:55    


 


Urine Glucose (UA)  Negative  (NEGATIVE)   05/04/17  22:55    


 


Urine Ketones  Negative  (NEGATIVE)   05/04/17  22:55    


 


Urine Blood  Negative  (NEGATIVE)   05/04/17  22:55    


 


Urine Nitrite  Negative  (NEGATIVE)   05/04/17  22:55    


 


Urine Bilirubin  Negative  (NEGATIVE)   05/04/17  22:55    


 


Urine Urobilinogen  Negative E.U./dl (0.2-1.0)   05/04/17  22:55    


 


Ur Leukocyte Esterase  Negative  (NEGATIVE)   05/04/17  22:55    


 


RPR Titer  Nonreactive  (NONREACTIVE)   05/05/17  06:00    


 


HIV 1&2 Antibody Screen  Negative   05/05/17  06:00    


 


HIV P24 Antigen  Negative   05/05/17  06:00    








labs noted





Assessment: 


05/06/17 13:22


withdrawal sx


hyperkalemia


Plan: 


withdrawal sx


potassium supplement with repeat BMP

## 2017-05-07 LAB
ANION GAP SERPL CALC-SCNC: 8 MMOL/L (ref 8–16)
CALCIUM SERPL-MCNC: 9.5 MG/DL (ref 8.5–10.1)
CO2 SERPL-SCNC: 29 MMOL/L (ref 21–32)
COCKROFT - GAULT: 92.49
CREAT SERPL-MCNC: 1.1 MG/DL (ref 0.7–1.3)
GLUCOSE SERPL-MCNC: 94 MG/DL (ref 74–106)

## 2017-05-07 RX ADMIN — Medication SCH MG: at 22:07

## 2017-05-07 RX ADMIN — Medication SCH TAB: at 10:06

## 2017-05-07 NOTE — PN
BHS Progress Note (SOAP)


Subjective: 


Sweating,interrupted sleep,restless.


Objective: 





05/07/17 10:49


 Vital Signs - 8 hr











  05/07/17 05/07/17 05/07/17





  03:30 06:00 09:45


 


Temperature  97.9 F 97.6 F


 


Pulse Rate  64 97 H


 


Respiratory 18 18 16





Rate   


 


Blood Pressure  120/68 127/82








 Laboratory Last Values











WBC  5.9 K/mm3 (4.0-10.0)   05/05/17  06:00    


 


RBC  4.66 M/mm3 (4.00-5.60)   05/05/17  06:00    


 


Hgb  13.7 GM/dL (11.7-16.9)   05/05/17  06:00    


 


Hct  40.3 % (35.4-49)   05/05/17  06:00    


 


MCV  86.4 fl (80-96)   05/05/17  06:00    


 


MCHC  33.9 g/dl (32.0-35.9)   05/05/17  06:00    


 


RDW  14.2 % (11.9-15.9)   05/05/17  06:00    


 


Plt Count  253 K/MM3 (134-434)   05/05/17  06:00    


 


MPV  8.3 fl (7.5-11.1)  D 05/05/17  06:00    


 


Sodium  140 mmol/L (136-145)   05/07/17  08:00    


 


Potassium  3.8 mmol/L (3.5-5.1)  D 05/07/17  08:00    


 


Chloride  103 mmol/L ()   05/07/17  08:00    


 


Carbon Dioxide  29 mmol/L (21-32)   05/07/17  08:00    


 


Anion Gap  8  (8-16)   05/07/17  08:00    


 


BUN  11 mg/dL (7-18)   05/07/17  08:00    


 


Creatinine  1.1 mg/dL (0.7-1.3)   05/07/17  08:00    


 


Creat Clearance w eGFR  > 60  (>60)   05/05/17  06:00    


 


Random Glucose  94 mg/dL ()  D 05/07/17  08:00    


 


Calcium  9.5 mg/dL (8.5-10.1)   05/07/17  08:00    


 


Total Bilirubin  0.5 mg/dL (0.2-1.0)  D 05/05/17  06:00    


 


AST  58 U/L (15-37)  H D 05/05/17  06:00    


 


ALT  88 U/L (12-78)  H D 05/05/17  06:00    


 


Alkaline Phosphatase  84 U/L ()  D 05/05/17  06:00    


 


Total Protein  8.3 g/dl (6.4-8.2)  H  05/05/17  06:00    


 


Albumin  4.2 g/dl (3.4-5.0)   05/05/17  06:00    


 


Urine Color  Ltyellow   05/04/17  22:55    


 


Urine Appearance  Clear   05/04/17  22:55    


 


Urine pH  6.0  (5.0-8.0)   05/04/17  22:55    


 


Ur Specific Gravity  1.010  (1.001-1.035)   05/04/17  22:55    


 


Urine Protein  Negative  (NEGATIVE)   05/04/17  22:55    


 


Urine Glucose (UA)  Negative  (NEGATIVE)   05/04/17  22:55    


 


Urine Ketones  Negative  (NEGATIVE)   05/04/17  22:55    


 


Urine Blood  Negative  (NEGATIVE)   05/04/17  22:55    


 


Urine Nitrite  Negative  (NEGATIVE)   05/04/17  22:55    


 


Urine Bilirubin  Negative  (NEGATIVE)   05/04/17  22:55    


 


Urine Urobilinogen  Negative E.U./dl (0.2-1.0)   05/04/17  22:55    


 


Ur Leukocyte Esterase  Negative  (NEGATIVE)   05/04/17  22:55    


 


RPR Titer  Nonreactive  (NONREACTIVE)   05/05/17  06:00    


 


HIV 1&2 Antibody Screen  Negative   05/05/17  06:00    


 


HIV P24 Antigen  Negative   05/05/17  06:00    








labs noted


Assessment: 





05/07/17 10:51


Withdrawal sx.


Plan: 


Continue detox

## 2017-05-08 VITALS — SYSTOLIC BLOOD PRESSURE: 116 MMHG | TEMPERATURE: 97.7 F | HEART RATE: 88 BPM | DIASTOLIC BLOOD PRESSURE: 72 MMHG

## 2017-05-08 PROCEDURE — HZ2ZZZZ DETOXIFICATION SERVICES FOR SUBSTANCE ABUSE TREATMENT: ICD-10-PCS | Performed by: INTERNAL MEDICINE

## 2017-05-08 NOTE — DS
[facility] Detox Discharge Summary


Admission Date: 


05/04/17





Discharge Date: 05/08/17





- History


Present History: Alcohol Dependence





- Physical Exam Results


Vital Signs: 


 Vital Signs











Temperature  97.7 F   05/08/17 06:30


 


Pulse Rate  88   05/08/17 06:30


 


Respiratory Rate  18   05/08/17 06:30


 


Blood Pressure  116/72   05/08/17 06:30


 


O2 Sat by Pulse Oximetry (%)      














- Treatment


Hospital Course: Detox Protocol Followed, Detoxed Safely, Responded well, 

Discharged Condition Good, Rehab Referral Accepted





- Medication


Discharge Medications: 


Ambulatory Orders





NK [No Known Home Medication]  08/03/15 











- Diagnosis


(1) Alcohol dependence with uncomplicated withdrawal


Status: Chronic





(2) Alcohol-induced mood disorder


Status: Acute





(3) Depression


Status: Acute   





(4) Mood disorder


Status: Acute





(5) Syncope


Status: Acute   





(6) Weight loss


Status: Acute





(7) Alcohol dependence, episodic drinking behavior


Status: Chronic





(8) Cocaine abuse


Status: Chronic





(9) Nicotine dependence


Status: Acute   Qualifiers: 


     Nicotine product type: cigarettes     Substance use status: uncomplicated 

    Qualified Code(s): F17.210 - Nicotine dependence, cigarettes, uncomplicated

  





(10) Sedative dependence


Status: Chronic





(11) Status post transmetatarsal amputation of left foot


Status: Chronic





(12) depression


Status: Chronic





(13) Bipolar II disorder


Status: Suspected





(14) Bipolar disorder


Status: Suspected   





(15) Drug-induced mood disorder


Status: Suspected








- AMA


Did Patient Leave Against Medical Advice: No

## 2017-08-19 ENCOUNTER — HOSPITAL ENCOUNTER (INPATIENT)
Dept: HOSPITAL 74 - YASAS | Age: 47
LOS: 3 days | Discharge: OTHER INSTITUTION | End: 2017-08-22
Attending: INTERNAL MEDICINE | Admitting: INTERNAL MEDICINE
Payer: COMMERCIAL

## 2017-08-19 VITALS — BODY MASS INDEX: 25.8 KG/M2

## 2017-08-19 DIAGNOSIS — F31.81: ICD-10-CM

## 2017-08-19 DIAGNOSIS — F17.210: ICD-10-CM

## 2017-08-19 DIAGNOSIS — F13.10: ICD-10-CM

## 2017-08-19 DIAGNOSIS — F19.24: ICD-10-CM

## 2017-08-19 DIAGNOSIS — F10.230: Primary | ICD-10-CM

## 2017-08-19 DIAGNOSIS — S98.132D: ICD-10-CM

## 2017-08-19 LAB
APPEARANCE UR: (no result)
BILIRUB UR STRIP.AUTO-MCNC: NEGATIVE MG/DL
COLOR UR: YELLOW
KETONES UR QL STRIP: (no result)
LEUKOCYTE ESTERASE UR QL STRIP.AUTO: NEGATIVE
NITRITE UR QL STRIP: NEGATIVE
PH UR: 5 [PH] (ref 5–8)
PROT UR QL STRIP: NEGATIVE
PROT UR QL STRIP: NEGATIVE
RBC # UR STRIP: NEGATIVE /UL
UROBILINOGEN UR STRIP-MCNC: NEGATIVE MG/DL (ref 0.2–1)

## 2017-08-19 RX ADMIN — Medication SCH MG: at 22:46

## 2017-08-19 NOTE — HP
CIWA Score





- CIWA Score


Nausea/Vomitin-Mild Nausea/No Vomiting


Muscle Tremors: 4-Moderate,w/Arms Extend


Anxiety: 4-Mod. Anxious/Guarded


Agitation: 0-Normal Activity


Paroxysmal Sweats: 1-Minimal Palms Moist


Orientation: 0-Oriented


Tacttile Disturbances: 1-Very Mild Itch/Numbness


Auditory Disturbances: 0-None


Visual Disturbances: 1-Very Mild Sensitivity


Headache: 2-Mild


CIWA-Ar Total Score: 14





Admission ROS [facility]





- HPI


Chief Complaint: 


I can't stop drinking on my own


Allergies/Adverse Reactions: 


 Allergies











Allergy/AdvReac Type Severity Reaction Status Date / Time


 


No Known Allergies Allergy   Verified 17 14:42











History of Present Illness: 


45 yo gentleman here for detox from alcohol - history of multiple admissions 

for detox - last here May 2017, states he comes here today from [facility] 

Emergency room.  No seizures but does have black outs.


Exam Limitations: Clinical Condition





- Ebola screening


Have you traveled outside of the country in the last 21 days: No


Have you had contact with anyone from an Ebola affected area: No


Have you been sick,other than usual withdrawal symptoms: No


Do you have a fever: No





- Review of Systems


Constitutional: Chills, Loss of Appetite, Changes in sleep, Weakness


EENT: reports: Blurred Vision


Respiratory: reports: No Symptoms reported


Cardiac: reports: No Symptoms Reported


GI: reports: Nausea, Poor Appetite, Indigestion


: reports: Frequency


Musculoskeletal: reports: No Symptoms Reported


Integumentary: reports: No Symptoms Reported


Neuro: reports: Headache, Tremors


Endocrine: reports: No Symptoms Reported


Hematology: reports: No Symptoms Reported


Psychiatric: reports: Judgement Intact, Mood/Affect Appropiate, Orientated x3, 

Anxious


Other Systems: Reviewed and Negative





Patient History





- Patient Medical History


Hx Anemia: No


Hx Asthma: No


Hx Chronic Obstructive Pulmonary Disease (COPD): No


Hx Cancer: No


Hx Cardiac Disorders: No


Hx Congestive Heart Failure: No


Hx Hypertension: No


Hx Hypercholesterolemia: No


Hx Pacemaker: No


HX Cerebrovascular Accident: No


Hx Seizures: No


Hx Dementia: No


Hx Diabetes: No


Hx Gastrointestinal Disorders: No


Hx Liver Disease: No


Hx Genitourinary Disorders: No


Hx Sexually Transmitted Disorders: No


Hx Renal Disease (ESRD): No


Hx Thyroid Disease: No


Hx Human Immunodeficiency Virus (HIV): No


Hx Hepatitis C: No


Hx Depression: Yes (no treatment, not suicidal)


Hx Suicide Attempt: No


Hx Bipolar Disorder: No


Hx Schizophrenia: No


Other Medical History: PPD+ history 2005 - treated for 2 months





- Patient Surgical History


Past Surgical History: Yes


Hx Neurologic Surgery: No


Hx Cataract Extraction: No


Hx Cardiac Surgery: No


Hx Lung Surgery: No


Hx Breast Surgery: No


Hx Breast Biopsy: No


Hx Abdominal Surgery: No


Hx Appendectomy: No


Hx Cholecystectomy: No


Hx Genitourinary Surgery: No


Hx  Section: No


Hx Orthopedic Surgery: Yes (traumatic transmetatarsal amputation of left foot 

in  in [facility])


Hx Hysterectomy: No


Other Surgical History: h/o nasal and rt rib fx's in past 


Anesthesia Reaction: No





- PPD History


Previous Implant?: Yes


Documented Results: Positive w/o proof


Date: 17 (cxr)


Results: positive


PPD to be Administered?: No





- Reproductive History


Patient is a Female of Child Bearing Age (11 -55 yrs old): No (male)





- Smoking Cessation


Smoking history: Never smoked


Have you smoked in the past 12 months: No


Aproximately how many cigarettes per day: 0


If you are a former smoker, when did you quit?: more than year ago


Cigars Per Day: 0


Hx Chewing Tobacco Use: No





- Substance & Tx. History


Hx Alcohol Use: Yes


Hx Substance Use: Yes


Substance Use Type: Alcohol, Tranquilizers


Hx Substance Use Treatment: Yes (detox, rehab)





- Substances Abused


  ** Alcohol


Route: Oral


Frequency: Daily


Amount used: 4 six packs 24oz beers; 1 pint vodka


Date of Last Use: 17





  ** Alprazolam (Xanax)


Route: Oral


Frequency: 1-3 times last 30 days


Amount used: 2mg


Age of first use: 46


Date of Last Use: 17





Family Disease History





- Family Disease History


Family Disease History: Respiratory: Father (living, ALCOHOL in past), Other: 

Mother (living, RA), Brother (two - living - healthy), Sister (one - living - 

healthy), Daughter (one - age 18 - healthy)





Admission Physical Exam [facility]





- Vital Signs


Vital Signs: 


 Vital Signs - 24 hr











  17





  10:36


 


Temperature 98.8 F


 


Pulse Rate 86


 


Respiratory 18





Rate 


 


Blood Pressure 160/100














- Physical


General Appearance: Yes: Nourished, Appropriately Dressed, Mild Distress, 

Tremorous, Anxious


HEENTM: Yes: Hearing grossly Normal, Normal ENT Inspection, Normocephalic, 

Normal Voice


Respiratory: Yes: Normal Breath Sounds, No Respiratory Distress


Neck: Yes: No masses,lesions,Nodules, Supple


Breast: Yes: Breast Exam Deferred


Cardiology: Yes: Regular Rhythm, Regular Rate


Abdominal: Yes: Soft


Genitourinary: Yes: Frequency


Back: Yes: Normal Inspection


Musculoskeletal: Yes: full range of Motion, Gait Steady


Extremities: Yes: Normal Inspection, Normal Range of Motion, Non-Tender, 

Amputation (left transmetatarsal amputation)


Neurological: Yes: Alert, Normal Mood/Affect, Normal Response


Integumentary: Yes: Normal Color, Warm


Lymphatic: Yes: Within Normal Limits





- Diagnostic


(1) Alcohol dependence with uncomplicated withdrawal


Current Visit: Yes   Status: Chronic





(2) Mild alprazolam abuse


Current Visit: Yes   Status: Chronic





(3) Amputation, traumatic, toes


Current Visit: Yes   Status: Chronic   Qualifiers: 


     Encounter type: subsequent encounter     Laterality: left        Qualified 

Code(s): S98.132D - Complete traumatic amputation of one left lesser toe, 

subsequent encounter  





(4) PPD positive


Current Visit: Yes   Status: Chronic


Comment: treated for two months











Cleared for Admission [facility]





- Detox or Rehab


[facility] Level of Care: Medically Managed


Detox Regimen/Protocol: Librium





[facility] Breath Alcohol Content


Breath Alcohol Content: 0.018





Urine Drug Screen





- Results


Drug Screen Negative: No


Urine Drug Screen Results: BZO-Benzodiazepines

## 2017-08-20 LAB
ALBUMIN SERPL-MCNC: 3.6 G/DL (ref 3.4–5)
ALP SERPL-CCNC: 78 U/L (ref 45–117)
ALT SERPL-CCNC: 47 U/L (ref 12–78)
ANION GAP SERPL CALC-SCNC: 5 MMOL/L (ref 8–16)
AST SERPL-CCNC: 39 U/L (ref 15–37)
BILIRUB SERPL-MCNC: 0.5 MG/DL (ref 0.2–1)
CALCIUM SERPL-MCNC: 9.3 MG/DL (ref 8.5–10.1)
CO2 SERPL-SCNC: 29 MMOL/L (ref 21–32)
CREAT SERPL-MCNC: 1.1 MG/DL (ref 0.7–1.3)
DEPRECATED RDW RBC AUTO: 15.9 % (ref 11.9–15.9)
GLUCOSE SERPL-MCNC: 98 MG/DL (ref 74–106)
HIV 1 & 2 AB: NEGATIVE
HIV 1 AGP24: NEGATIVE
MCH RBC QN AUTO: 29.4 PG (ref 25.7–33.7)
MCHC RBC AUTO-ENTMCNC: 34 G/DL (ref 32–35.9)
MCV RBC: 86.4 FL (ref 80–96)
PLATELET # BLD AUTO: 261 K/MM3 (ref 134–434)
PMV BLD: 8 FL (ref 7.5–11.1)
PROT SERPL-MCNC: 7.5 G/DL (ref 6.4–8.2)
SP GR UR: 1.02 (ref 1–1.02)
WBC # BLD AUTO: 5 K/MM3 (ref 4–10)

## 2017-08-20 RX ADMIN — Medication SCH TAB: at 11:07

## 2017-08-20 RX ADMIN — HYDROXYZINE PAMOATE PRN MG: 50 CAPSULE ORAL at 22:54

## 2017-08-20 RX ADMIN — HYDROXYZINE PAMOATE PRN MG: 50 CAPSULE ORAL at 11:08

## 2017-08-20 RX ADMIN — Medication SCH MG: at 22:54

## 2017-08-20 NOTE — EKG
Test Reason : 

Blood Pressure : ***/*** mmHG

Vent. Rate : 075 BPM     Atrial Rate : 075 BPM

   P-R Int : 144 ms          QRS Dur : 100 ms

    QT Int : 392 ms       P-R-T Axes : 062 060 040 degrees

   QTc Int : 437 ms

 

NORMAL SINUS RHYTHM

NORMAL ECG

WHEN COMPARED WITH ECG OF 04-MAY-2017 18:30,

VENT. RATE HAS DECREASED BY  39 BPM

Confirmed by RITCHIE HANCOCK MD ([id number]) on 8/20/2017 12:52:11 PM

 

Referred By:             Confirmed By:RITCHIE HANCOCK MD

## 2017-08-20 NOTE — PN
S CIWA





- CIWA Score


Nausea/Vomiting: 3


Muscle Tremors: 3


Anxiety: 3


Agitation: 3


Paroxysmal Sweats: 1-Minimal Palms Moist


Orientation: 0-Oriented


Tacttile Disturbances: 1-Very Mild Itch/Numbness


Auditory Disturbances: 1-Very Mild


Visual Disturbances: 1-Very Mild Sensitivity


Headache: 2-Mild


CIWA-Ar Total Score: 18





B[facility] Progress Note (SOAP)


Subjective: 


ALERT,IRRITABLE,ANXIOUS,INTERRUPTED SLEEP,TREMOR


Objective: 





08/20/17 13:19


 Vital Signs











Temperature  97 F L  08/20/17 10:41


 


Pulse Rate  79   08/20/17 10:41


 


Respiratory Rate  18   08/20/17 10:41


 


Blood Pressure  131/89   08/20/17 10:41


 


O2 Sat by Pulse Oximetry (%)      








EKG NSR


NORMAL ECG


 Laboratory Last Values











WBC  5.0 K/mm3 (4.0-10.0)   08/20/17  07:45    


 


RBC  4.47 M/mm3 (4.00-5.60)   08/20/17  07:45    


 


Hgb  13.1 GM/dL (11.7-16.9)   08/20/17  07:45    


 


Hct  38.6 % (35.4-49)   08/20/17  07:45    


 


MCV  86.4 fl (80-96)   08/20/17  07:45    


 


MCH  29.4 pg (25.7-33.7)   08/20/17  07:45    


 


MCHC  34.0 g/dl (32.0-35.9)   08/20/17  07:45    


 


RDW  15.9 % (11.9-15.9)  D 08/20/17  07:45    


 


Plt Count  261 K/MM3 (134-434)   08/20/17  07:45    


 


MPV  8.0 fl (7.5-11.1)   08/20/17  07:45    


 


Sodium  141 mmol/L (136-145)   08/20/17  07:45    


 


Potassium  4.3 mmol/L (3.5-5.1)   08/20/17  07:45    


 


Chloride  107 mmol/L ()   08/20/17  07:45    


 


Carbon Dioxide  29 mmol/L (21-32)   08/20/17  07:45    


 


Anion Gap  5  (8-16)  L  08/20/17  07:45    


 


BUN  16 mg/dL (7-18)  D 08/20/17  07:45    


 


Creatinine  1.1 mg/dL (0.7-1.3)   08/20/17  07:45    


 


Creat Clearance w eGFR  > 60  (>60)   08/20/17  07:45    


 


Random Glucose  98 mg/dL ()   08/20/17  07:45    


 


Calcium  9.3 mg/dL (8.5-10.1)   08/20/17  07:45    


 


Total Bilirubin  0.5 mg/dL (0.2-1.0)   08/20/17  07:45    


 


AST  39 U/L (15-37)  H D 08/20/17  07:45    


 


ALT  47 U/L (12-78)  D 08/20/17  07:45    


 


Alkaline Phosphatase  78 U/L ()   08/20/17  07:45    


 


Total Protein  7.5 g/dl (6.4-8.2)   08/20/17  07:45    


 


Albumin  3.6 g/dl (3.4-5.0)   08/20/17  07:45    


 


Urine Color  Yellow   08/19/17  23:10    


 


Urine Appearance  Slcloudy   08/19/17  23:10    


 


Urine pH  5.0  (5.0-8.0)   08/19/17  23:10    


 


Ur Specific Gravity  1.025  (1.005-1.025)   08/19/17  23:10    


 


Urine Protein  Negative  (NEGATIVE)   08/19/17  23:10    


 


Urine Glucose (UA)  Negative  (NEGATIVE)   08/19/17  23:10    


 


Urine Ketones  Trace  (NEGATIVE)  H  08/19/17  23:10    


 


Urine Blood  Negative  (NEGATIVE)   08/19/17  23:10    


 


Urine Nitrite  Negative  (NEGATIVE)   08/19/17  23:10    


 


Urine Bilirubin  Negative  (NEGATIVE)   08/19/17  23:10    


 


Urine Urobilinogen  Negative mg/dL (0.2-1.0)   08/19/17  23:10    


 


Ur Leukocyte Esterase  Negative  (NEGATIVE)   08/19/17  23:10    


 


RPR Titer  Nonreactive  (NONREACTIVE)   08/20/17  07:45    


 


HIV 1&2 Antibody Screen  Negative   08/20/17  07:45    


 


HIV P24 Antigen  Negative   08/20/17  07:45    











Assessment: 





08/20/17 13:20


WITHDRAWAL SYMPTOM


Plan: 


CONTINUE DETOX

## 2017-08-21 RX ADMIN — Medication SCH TAB: at 10:31

## 2017-08-21 RX ADMIN — Medication SCH MG: at 22:14

## 2017-08-21 RX ADMIN — HYDROXYZINE PAMOATE PRN MG: 50 CAPSULE ORAL at 10:31

## 2017-08-21 NOTE — PN
S CIWA





- CIWA Score


Nausea/Vomiting: 3


Muscle Tremors: 3


Anxiety: 3


Agitation: 2


Paroxysmal Sweats: 2


Orientation: 0-Oriented


Tacttile Disturbances: 1-Very Mild Itch/Numbness


Auditory Disturbances: 1-Very Mild


Visual Disturbances: 1-Very Mild Sensitivity


Headache: 2-Mild


CIWA-Ar Total Score: 18





BHS Progress Note (SOAP)


Subjective: 


ALERT,IRRITABLE,ANXIOUS,TREMOR,IRRITABLE,INTERRUPTED SLEEP


Objective: 


08/21/17 12:07


 Vital Signs











Temperature  98.1 F   08/21/17 10:39


 


Pulse Rate  78   08/21/17 10:39


 


Respiratory Rate  18   08/21/17 10:39


 


Blood Pressure  150/92   08/21/17 10:39


 


O2 Sat by Pulse Oximetry (%)      














08/21/17 12:07


 Laboratory Last Values











WBC  5.0 K/mm3 (4.0-10.0)   08/20/17  07:45    


 


RBC  4.47 M/mm3 (4.00-5.60)   08/20/17  07:45    


 


Hgb  13.1 GM/dL (11.7-16.9)   08/20/17  07:45    


 


Hct  38.6 % (35.4-49)   08/20/17  07:45    


 


MCV  86.4 fl (80-96)   08/20/17  07:45    


 


MCH  29.4 pg (25.7-33.7)   08/20/17  07:45    


 


MCHC  34.0 g/dl (32.0-35.9)   08/20/17  07:45    


 


RDW  15.9 % (11.9-15.9)  D 08/20/17  07:45    


 


Plt Count  261 K/MM3 (134-434)   08/20/17  07:45    


 


MPV  8.0 fl (7.5-11.1)   08/20/17  07:45    


 


Sodium  141 mmol/L (136-145)   08/20/17  07:45    


 


Potassium  4.3 mmol/L (3.5-5.1)   08/20/17  07:45    


 


Chloride  107 mmol/L ()   08/20/17  07:45    


 


Carbon Dioxide  29 mmol/L (21-32)   08/20/17  07:45    


 


Anion Gap  5  (8-16)  L  08/20/17  07:45    


 


BUN  16 mg/dL (7-18)  D 08/20/17  07:45    


 


Creatinine  1.1 mg/dL (0.7-1.3)   08/20/17  07:45    


 


Creat Clearance w eGFR  > 60  (>60)   08/20/17  07:45    


 


Random Glucose  98 mg/dL ()   08/20/17  07:45    


 


Calcium  9.3 mg/dL (8.5-10.1)   08/20/17  07:45    


 


Total Bilirubin  0.5 mg/dL (0.2-1.0)   08/20/17  07:45    


 


AST  39 U/L (15-37)  H D 08/20/17  07:45    


 


ALT  47 U/L (12-78)  D 08/20/17  07:45    


 


Alkaline Phosphatase  78 U/L ()   08/20/17  07:45    


 


Total Protein  7.5 g/dl (6.4-8.2)   08/20/17  07:45    


 


Albumin  3.6 g/dl (3.4-5.0)   08/20/17  07:45    


 


Urine Color  Yellow   08/19/17  23:10    


 


Urine Appearance  Slcloudy   08/19/17  23:10    


 


Urine pH  5.0  (5.0-8.0)   08/19/17  23:10    


 


Ur Specific Gravity  1.025  (1.005-1.025)   08/19/17  23:10    


 


Urine Protein  Negative  (NEGATIVE)   08/19/17  23:10    


 


Urine Glucose (UA)  Negative  (NEGATIVE)   08/19/17  23:10    


 


Urine Ketones  Trace  (NEGATIVE)  H  08/19/17  23:10    


 


Urine Blood  Negative  (NEGATIVE)   08/19/17  23:10    


 


Urine Nitrite  Negative  (NEGATIVE)   08/19/17  23:10    


 


Urine Bilirubin  Negative  (NEGATIVE)   08/19/17  23:10    


 


Urine Urobilinogen  Negative mg/dL (0.2-1.0)   08/19/17  23:10    


 


Ur Leukocyte Esterase  Negative  (NEGATIVE)   08/19/17  23:10    


 


RPR Titer  Nonreactive  (NONREACTIVE)   08/20/17  07:45    


 


HIV 1&2 Antibody Screen  Negative   08/20/17  07:45    


 


HIV P24 Antigen  Negative   08/20/17  07:45    











Assessment: 





08/21/17 12:07


WITHDRAWAL SYMPTOM


Plan: 


CONTINUE DETOX

## 2017-08-21 NOTE — CONSULT
BHS Psychiatric Consult





- Data


Date of interview: 08/21/17


Admission source: [facility]


Identifying data: This is 46 years old male with no psychiatric hospitalization 

history intoxicated with:  Alcohol, Xanax, Cocaine anm n  Nicotine


Substance Abuse History: - Smoking Cessation.  Smoking history: Never smoked.  

Have you smoked in the past 12 months: No.  Aproximately how many cigarettes 

per day: 0.  If you are a former smoker, when did you quit?: more than year 

ago.  Cigars Per Day: 0.  Hx Chewing Tobacco Use: No.  - Substance & Tx. 

History.  Hx Alcohol Use: Yes.  Hx Substance Use: Yes.  Substance Use Type: 

Alcohol, Tranquilizers.  Hx Substance Use Treatment: Yes (detox, rehab).  - 

Substances Abused.  ** Alcohol.  Route: Oral.  Frequency: Daily.  Amount used: 

4 six packs 24oz beers; 1 pint vodka.  Date of Last Use: 08/19/17.  ** 

Alprazolam (Xanax).  Route: Oral.  Frequency: 1-3 times last 30 days.  Amount 

used: 2mg.  Age of first use: 46.  Date of Last Use: 08/18/17


Medical History: PPD+ Hisotry, Syncope history, Weight loss history


Psychiatric History: Denies past psychiatric history, as per ciomputer carries 

Bipolar Disorder


Physical/Sexual Abuse/Trauma History: Denies


Additional Comment: Observation.  Detox Unit Care Protocol





Mental Status Exam





- Mental Status Exam


Alert and Oriented to: Person


Cognitive Function: Fair


Patient Appearance: Well Groomed


Mood: Apprehensive


Affect: Appropriate


Patient Behavior: Cooperative


Speech Pattern: Appropriate


Voice Loudness: Normal


Thought Process: Goal Oriented


Thought Disorder: Being Controlled


Hallucinations: Denies


Suicidal Ideation: Denies


Homicidal Ideation: Denies


Insight/Judgement: Fair


Sleep: Difficulty falling asleep


Appetite: Fair


Muscle strength/Tone: Normal


Gait/Station: Normal


Additional Comments: Haldol 5mg po qhs.  Trazodone 100mg po qhs.  Vistaril 50mg 

po qid





Psychiatric Findings





- Problem List (Axis 1, 2,3)


(1) Alcohol dependence with uncomplicated withdrawal


Current Visit: Yes   Status: Chronic





(2) Mild alprazolam abuse


Current Visit: Yes   Status: Chronic





(3) Mood disorder


Current Visit: No   Status: Acute





(4) Nicotine dependence


Current Visit: No   Status: Acute   Qualifiers: 


     Nicotine product type: cigarettes     Substance use status: uncomplicated 

    Qualified Code(s): F17.210 - Nicotine dependence, cigarettes, uncomplicated

  





(5) Alcohol dependence, episodic drinking behavior


Current Visit: No   Status: Chronic





(6) Cocaine abuse


Current Visit: No   Status: Chronic





(7) Status post transmetatarsal amputation of left foot


Current Visit: No   Status: Chronic





(8) Bipolar II disorder


Current Visit: No   Status: Suspected





(9) Drug-induced mood disorder


Current Visit: No   Status: Suspected








- Initial Treatment Plan


Initial Treatment Plan: Observation.  Detox Unit Care Protocol

## 2017-08-22 ENCOUNTER — HOSPITAL ENCOUNTER (INPATIENT)
Dept: HOSPITAL 74 - YASAS | Age: 47
LOS: 14 days | Discharge: HOME | DRG: 772 | End: 2017-09-05
Attending: PSYCHIATRY & NEUROLOGY | Admitting: PSYCHIATRY & NEUROLOGY
Payer: COMMERCIAL

## 2017-08-22 VITALS — HEART RATE: 90 BPM | DIASTOLIC BLOOD PRESSURE: 88 MMHG | SYSTOLIC BLOOD PRESSURE: 135 MMHG | TEMPERATURE: 98.2 F

## 2017-08-22 DIAGNOSIS — F10.280: Primary | ICD-10-CM

## 2017-08-22 DIAGNOSIS — Z89.432: ICD-10-CM

## 2017-08-22 DIAGNOSIS — L29.9: ICD-10-CM

## 2017-08-22 DIAGNOSIS — L25.9: ICD-10-CM

## 2017-08-22 PROCEDURE — HZ42ZZZ GROUP COUNSELING FOR SUBSTANCE ABUSE TREATMENT, COGNITIVE-BEHAVIORAL: ICD-10-PCS | Performed by: PSYCHIATRY & NEUROLOGY

## 2017-08-22 PROCEDURE — HZ2ZZZZ DETOXIFICATION SERVICES FOR SUBSTANCE ABUSE TREATMENT: ICD-10-PCS | Performed by: INTERNAL MEDICINE

## 2017-08-22 RX ADMIN — HYDROXYZINE PAMOATE PRN MG: 50 CAPSULE ORAL at 10:40

## 2017-08-22 RX ADMIN — HYDROXYZINE PAMOATE PRN MG: 50 CAPSULE ORAL at 17:19

## 2017-08-22 RX ADMIN — Medication SCH TAB: at 10:40

## 2017-08-22 RX ADMIN — Medication SCH MG: at 22:28

## 2017-08-22 NOTE — PN
BHS Progress Note (SOAP)


Subjective: 


ALERT,IRRITABLE,ANXIOUS,INTERRUPTED SLEEP


Objective: 





08/22/17 10:24


 Vital Signs











Temperature  98.1 F   08/22/17 09:27


 


Pulse Rate  83   08/22/17 09:27


 


Respiratory Rate  20   08/22/17 09:27


 


Blood Pressure  137/88   08/22/17 09:27


 


O2 Sat by Pulse Oximetry (%)      











Assessment: 





08/22/17 10:24


WITHDRAWAL SYMPTOM


Plan: 


CONTINUE DETOX,DISCHARGE IN AM

## 2017-08-22 NOTE — HP
BHS MD Rehab Assess/Revision





- Admission History


Ad[facility] to Rehab from: [facility]


Date of Admission to Rehab: 08/22/17





- Findings


Detox History & Physical reviewed: Yes


Concur with findings: Yes


Comments/Additional Findings: TRANSFERRED FROM DETOX TO REHAB ADMISSION AS PER 

PROTOCOL

## 2017-08-22 NOTE — DS
BHS Detox Discharge Summary


Admission Date: 


08/19/17





Discharge Date: 08/22/17





- History


Present History: Alcohol Dependence


Additional Comments: 


[facility]


Pertinent Past History: 


s/p transmetatarsal amputation left foot





- Physical Exam Results


Vital Signs: 


 Vital Signs











Temperature  98.1 F   08/22/17 09:27


 


Pulse Rate  83   08/22/17 09:27


 


Respiratory Rate  20   08/22/17 09:27


 


Blood Pressure  137/88   08/22/17 09:27


 


O2 Sat by Pulse Oximetry (%)      











Pertinent Admission Physical Exam Findings: 


withdrawal symptom





- Treatment


Hospital Course: Detox Protocol Followed, Detoxed Safely, Responded well, 

Discharged Condition Good, Rehab Referral Accepted


Patient has Accepted a Rehab Referral to: [email or web address]





- Medication


Discharge Medications: 


Ambulatory Orders





NK [No Known Home Medication]  08/03/15 











- Diagnosis


(1) Amputation, traumatic, toes


Current Visit: Yes   Status: Chronic   Qualifiers: 


     Encounter type: subsequent encounter     Laterality: left        Qualified 

Code(s): S98.132D - Complete traumatic amputation of one left lesser toe, 

subsequent encounter  





(2) PPD positive


Current Visit: Yes   Status: Chronic





(3) Alcohol-induced mood disorder


Current Visit: No   Status: Acute





(4) Depression


Current Visit: No   Status: Acute   





(5) Mood disorder


Current Visit: No   Status: Acute





(6) Nicotine dependence


Current Visit: No   Status: Acute   Qualifiers: 


     Nicotine product type: cigarettes     Substance use status: uncomplicated 

    Qualified Code(s): F17.210 - Nicotine dependence, cigarettes, uncomplicated

  





(7) Syncope


Current Visit: No   Status: Acute   





(8) Weight loss


Current Visit: No   Status: Acute





(9) Status post transmetatarsal amputation of left foot


Current Visit: No   Status: Chronic








- AMA


Did Patient Leave Against Medical Advice: No

## 2017-08-23 RX ADMIN — Medication SCH MG: at 21:39

## 2017-08-23 RX ADMIN — HYDROXYZINE PAMOATE PRN MG: 50 CAPSULE ORAL at 21:57

## 2017-08-23 RX ADMIN — Medication SCH TAB: at 10:04

## 2017-08-23 RX ADMIN — HYDROXYZINE PAMOATE PRN MG: 50 CAPSULE ORAL at 12:44

## 2017-08-23 RX ADMIN — HYDROXYZINE PAMOATE PRN MG: 50 CAPSULE ORAL at 18:15

## 2017-08-23 NOTE — HP
Psychiatrist Admission





- Data


Date of interview: 08/23/17


Admission source: [facility]


Identifying data: This is the second inpatient rehabilitation admission for 

this 46 year old  male who is domiciled and employed.


Medical History: Patient reports left foot transmetatarsal amputation in may 

2017.


Psychiatric History: Patient reports no history of psychiatric hospitalizations

, a few times visited [facility] while intoxicated with alcohol, reports  was 

released after a few hours of observation.Patient denies taking any medications 

at this time.He reports he feels anxious , he denies history of suicide 

attempts.


Physical/Sexual Abuse/Trauma History: Patient denies history of abuse.


Vital Signs: 


 Vital Signs - 24 hr











  08/22/17 08/23/17 08/23/17





  22:29 00:32 03:30


 


Temperature 98.4 F  


 


Pulse Rate 90  


 


Respiratory 18 18 18





Rate   


 


Blood Pressure 136/91  











Allergies/Adverse Reactions: 


 Allergies











Allergy/AdvReac Type Severity Reaction Status Date / Time


 


No Known Allergies Allergy   Verified 08/19/17 14:42











Date of last physical exam: 08/19/17


Concur with the findings of this exam: Yes





- Substance Abuse/Tx History


Hx Alcohol Use: Yes (started at age of 15, reports a binge drinking(thursdays 

till saturdays))


Hx Substance Use: No


Substance Use Type: Alcohol (Vodka 1 pint)


Hx Substance Use Treatment: Yes (several [facility] detox and one rehab at 3)





- Admission Criteria


Previous failed treatment: Yes


Poor recovery environment: Yes


Comorbidities: Yes


Lacks judgement: Yes





Mental Status Exam





- Mental Status Exam


Alert and Oriented to: Time, Place


Cognitive Function: Grossly Intact


Mood: Sad, Anxious


Affect: Appropriate, Mood Congruent


Patient Behavior: Appropriate, Cooperative


Speech Pattern: Clear, Appropriate


Voice Loudness: Normal


Thought Process: Goal Oriented


Thought Disorder: Not Present


Hallucinations: Denies


Suicidal Ideation: Denies


Homicidal Ideation: Denies


Insight/Judgement: Fair


Sleep: Fair


Appetite: Fair


Muscle strength/Tone: Normal


Gait/Station: Normal





Psychiatric Findings





- Problem List (Axis 1, 2,3)


(1) Alcohol dependence


Current Visit: Yes   Status: Acute   





(2) Alcohol-induced anxiety disorder


Current Visit: Yes   Status: Acute








- Initial Treatment Plan


Initial Treatment Plan: Discussed with the patient properties and insications 

of Buspar for anxiety, Campral for alcohol craving, patient reported he needs 

time to think about treatment, patient made aware of Vistaril PRN order for 

anxiety, will monitor progress as needed.

## 2017-08-24 RX ADMIN — HYDROXYZINE PAMOATE PRN MG: 50 CAPSULE ORAL at 10:15

## 2017-08-24 RX ADMIN — Medication SCH TAB: at 10:14

## 2017-08-24 RX ADMIN — HYDROXYZINE PAMOATE PRN MG: 50 CAPSULE ORAL at 14:26

## 2017-08-24 RX ADMIN — Medication SCH MG: at 21:24

## 2017-08-24 RX ADMIN — HYDROXYZINE PAMOATE PRN MG: 50 CAPSULE ORAL at 19:17

## 2017-08-25 RX ADMIN — Medication SCH TAB: at 10:08

## 2017-08-25 RX ADMIN — HYDROXYZINE PAMOATE PRN MG: 50 CAPSULE ORAL at 07:08

## 2017-08-25 RX ADMIN — ACETAMINOPHEN PRN MG: 325 TABLET ORAL at 10:08

## 2017-08-25 RX ADMIN — Medication SCH MG: at 21:20

## 2017-08-25 RX ADMIN — HYDROXYZINE PAMOATE PRN MG: 50 CAPSULE ORAL at 19:32

## 2017-08-26 RX ADMIN — HYDROXYZINE PAMOATE PRN MG: 50 CAPSULE ORAL at 07:04

## 2017-08-26 RX ADMIN — Medication SCH TAB: at 09:46

## 2017-08-26 RX ADMIN — Medication SCH MG: at 21:54

## 2017-08-27 RX ADMIN — Medication SCH TAB: at 10:04

## 2017-08-27 RX ADMIN — Medication SCH MG: at 21:29

## 2017-08-27 RX ADMIN — HYDROXYZINE PAMOATE PRN MG: 50 CAPSULE ORAL at 03:28

## 2017-08-28 RX ADMIN — FLUOCINONIDE SCH: 0.5 OINTMENT TOPICAL at 14:32

## 2017-08-28 RX ADMIN — Medication SCH MG: at 21:36

## 2017-08-28 RX ADMIN — Medication SCH TAB: at 10:22

## 2017-08-28 RX ADMIN — HYDROXYZINE PAMOATE PRN MG: 50 CAPSULE ORAL at 10:22

## 2017-08-28 RX ADMIN — FLUOCINONIDE SCH APPLIC: 0.5 OINTMENT TOPICAL at 21:36

## 2017-08-28 NOTE — PN
BHS Progress Note


Note: 


rash of right facial area itching


contact dermatitis


lidex ointment 0.05%bid

## 2017-08-29 RX ADMIN — FLUOCINONIDE SCH: 0.5 OINTMENT TOPICAL at 21:24

## 2017-08-29 RX ADMIN — Medication SCH MG: at 21:24

## 2017-08-29 RX ADMIN — FLUOCINONIDE SCH APPLIC: 0.5 OINTMENT TOPICAL at 10:20

## 2017-08-29 RX ADMIN — IBUPROFEN PRN MG: 400 TABLET, FILM COATED ORAL at 06:48

## 2017-08-29 RX ADMIN — HYDROXYZINE PAMOATE PRN MG: 50 CAPSULE ORAL at 10:55

## 2017-08-29 RX ADMIN — Medication SCH TAB: at 10:19

## 2017-08-30 RX ADMIN — IBUPROFEN PRN MG: 400 TABLET, FILM COATED ORAL at 18:35

## 2017-08-30 RX ADMIN — Medication SCH TAB: at 09:56

## 2017-08-30 RX ADMIN — FLUOCINONIDE SCH: 0.5 OINTMENT TOPICAL at 21:32

## 2017-08-30 RX ADMIN — Medication SCH MG: at 21:32

## 2017-08-30 RX ADMIN — FLUOCINONIDE SCH APPLIC: 0.5 OINTMENT TOPICAL at 09:56

## 2017-08-30 RX ADMIN — HYDROXYZINE PAMOATE PRN MG: 50 CAPSULE ORAL at 07:06

## 2017-08-31 RX ADMIN — Medication SCH: at 22:25

## 2017-08-31 RX ADMIN — FLUOCINONIDE SCH: 0.5 OINTMENT TOPICAL at 22:25

## 2017-08-31 RX ADMIN — Medication SCH TAB: at 10:04

## 2017-08-31 RX ADMIN — HYDROXYZINE PAMOATE PRN MG: 50 CAPSULE ORAL at 08:29

## 2017-08-31 RX ADMIN — FLUOCINONIDE SCH: 0.5 OINTMENT TOPICAL at 10:04

## 2017-09-01 RX ADMIN — FLUOCINONIDE SCH: 0.5 OINTMENT TOPICAL at 10:04

## 2017-09-01 RX ADMIN — FLUOCINONIDE SCH: 0.5 OINTMENT TOPICAL at 21:23

## 2017-09-01 RX ADMIN — ACETAMINOPHEN PRN MG: 325 TABLET ORAL at 17:53

## 2017-09-01 RX ADMIN — Medication SCH MG: at 21:23

## 2017-09-01 RX ADMIN — Medication SCH TAB: at 10:04

## 2017-09-02 RX ADMIN — FLUOCINONIDE SCH APPLIC: 0.5 OINTMENT TOPICAL at 10:05

## 2017-09-02 RX ADMIN — Medication SCH TAB: at 10:05

## 2017-09-02 RX ADMIN — FLUOCINONIDE SCH APPLIC: 0.5 OINTMENT TOPICAL at 21:23

## 2017-09-02 RX ADMIN — HYDROXYZINE PAMOATE PRN MG: 50 CAPSULE ORAL at 10:05

## 2017-09-02 RX ADMIN — Medication SCH MG: at 21:23

## 2017-09-03 RX ADMIN — IBUPROFEN PRN MG: 400 TABLET, FILM COATED ORAL at 15:34

## 2017-09-03 RX ADMIN — FLUOCINONIDE SCH: 0.5 OINTMENT TOPICAL at 21:19

## 2017-09-03 RX ADMIN — Medication SCH MG: at 21:19

## 2017-09-03 RX ADMIN — Medication SCH TAB: at 10:04

## 2017-09-03 RX ADMIN — FLUOCINONIDE SCH APPLIC: 0.5 OINTMENT TOPICAL at 10:04

## 2017-09-03 RX ADMIN — HYDROXYZINE PAMOATE PRN MG: 50 CAPSULE ORAL at 10:04

## 2017-09-03 RX ADMIN — HYDROXYZINE PAMOATE PRN MG: 50 CAPSULE ORAL at 15:34

## 2017-09-04 RX ADMIN — FLUOCINONIDE SCH: 0.5 OINTMENT TOPICAL at 21:30

## 2017-09-04 RX ADMIN — Medication SCH TAB: at 10:01

## 2017-09-04 RX ADMIN — Medication SCH MG: at 21:30

## 2017-09-04 RX ADMIN — FLUOCINONIDE SCH: 0.5 OINTMENT TOPICAL at 10:01

## 2017-09-05 VITALS — HEART RATE: 86 BPM | TEMPERATURE: 98.1 F | SYSTOLIC BLOOD PRESSURE: 141 MMHG | DIASTOLIC BLOOD PRESSURE: 76 MMHG

## 2017-09-05 RX ADMIN — Medication SCH TAB: at 10:18

## 2017-09-05 RX ADMIN — FLUOCINONIDE SCH: 0.5 OINTMENT TOPICAL at 10:18

## 2017-09-05 NOTE — PN
Psychiatric Progress Note


Vital Signs: 


 Vital Signs











 Period  Temp  Pulse  Resp  BP Sys/Johnson  Pulse Ox


 


 Last 24 Hr  98.1 F-98.8 F  86-90  18-18  130-141/76-76  











Date of Session: 09/05/17


Chief Complaint:: discharge visit


HPI: Patient has addressed alcohol dependence comorbid alcohol induced anxiety.


ROS: WNL


Current Medications: 


Active Medications











Generic Name Dose Route Start Last Admin





  Trade Name Freq  PRN Reason Stop Dose Admin


 


Acetaminophen  650 mg 08/22/17 22:00 09/01/17 17:53





  Tylenol -  PO   650 mg





  Q4H PRN   Administration





  FEVER OR PAIN   


 


Al Hydroxide/Mg Hydroxide  30 ml 08/22/17 22:00 08/26/17 14:18





  Mylanta Oral Suspension -  PO   30 ml





  Q6H PRN   Administration





  DYSPEPSIA   


 


Colloidal Oatmeal  1 applic 08/30/17 14:17 08/30/17 14:55





  Aveeno Soap -  TP   1 applic





  DAILY PRN   Administration





  HYGEINE   


 


Diphenhydramine HCl  50 mg 08/22/17 22:00 09/04/17 21:30





  Benadryl -  PO   50 mg





  HSMR1 PRN   Administration





  FOR ITCHING   


 


Eucalyptus/Menthol/Phenol/Sorbitol  1 each 08/22/17 22:00  





  Cepastat Lozenge -  MM   





  Q4H PRN   





  SORE THROAT   


 


Fluocinonide  1 applic 08/28/17 14:00 09/05/17 10:18





  Lidex 0.05% Ointment -  TP   Not Given





  BID BRENDAN   


 


Guaifenesin  10 ml 08/22/17 22:00  





  Robitussin Dm -  PO   





  Q6H PRN   





  COUGH   


 


Hydroxyzine Pamoate  50 mg 08/23/17 11:59 09/03/17 15:34





  Vistaril -  PO   50 mg





  Q4H PRN   Administration





  ANXIETY   


 


Ibuprofen  400 mg 08/22/17 22:00 09/03/17 15:34





  Motrin -  PO   400 mg





  Q6H PRN   Administration





  PAIN   


 


Loperamide HCl  4 mg 08/22/17 22:00  





  Imodium -  PO   





  Q6H PRN   





  DIARRHEA   


 


Magnesium Hydroxide  30 ml 08/22/17 22:00 08/28/17 10:22





  Milk Of Magnesia -  PO   30 ml





  DAILY PRN   Administration





  CONSTIPATION   


 


Nicotine  14 mg 08/22/17 22:00  





  Nicoderm Patch -  TD   





  DAILY PRN   





  WITHDRAWAL(CONT SUBST)   


 


Nicotine Polacrilex  2 mg 08/22/17 22:00  





  Nicorette Gum -  BUC   





  Q2H PRN   





  NICOTINE REPLACEMENT RX   


 


Prenatal Multivit/Folic Acid/Iron  1 tab 08/23/17 10:00 09/05/17 10:18





  Prenatal Vitamins (Sjr) -  PO   1 tab





  DAILY BRENDAN   Administration


 


Pseudoephedrine/Triprolidine  1 combo 08/22/17 22:00  





  Actifed -  PO   





  TID PRN   





  NASAL CONGESTION   


 


Thiamine HCl  100 mg 08/22/17 22:00 09/04/17 21:30





  Vitamin B1 -  PO   100 mg





  HS BRENDAN   Administration











Current Side Effect: No


Lab tests ordered: No


Lab tests reviewed: Yes


Provider note:: Patient has completed today his treatment and mat his goals, 

will continue to address his issues at the [facility] outpatient 

treatment program. He is educated on his addiction, implication and cosequences 

on his physical and mental health and importance to maintain abstinence. 

Supportive therapy provided, patient is stable for discharge.


Total face to face time:: 35





Mental Status Exam





- Mental Status Exam


Alert and Oriented to: Time, Place, Person


Cognitive Function: Good


Patient Appearance: Well Groomed


Mood: Hopeful


Affect: Appropriate, Mood Congruent


Patient Behavior: Appropriate, Cooperative


Speech Pattern: Clear, Appropriate


Voice Loudness: Normal


Thought Process: Intact, Goal Oriented


Thought Disorder: Not Present


Hallucinations: Denies


Suicidal Ideation: Denies


Homicidal Ideation: Denies


Insight/Judgement: Fair


Sleep: Fair


Appetite: Fair


Muscle strength/Tone: Normal


Gait/Station: Normal





Psychiatric Treatment Plan





- Problem List


(1) Alcohol dependence


Current Visit: Yes   





(2) Alcohol-induced anxiety disorder


Current Visit: Yes

## 2017-10-18 ENCOUNTER — HOSPITAL ENCOUNTER (INPATIENT)
Dept: HOSPITAL 74 - YASAS | Age: 47
LOS: 3 days | Discharge: LEFT BEFORE DISCHARGE | DRG: 770 | End: 2017-10-21
Attending: INTERNAL MEDICINE | Admitting: INTERNAL MEDICINE
Payer: COMMERCIAL

## 2017-10-18 VITALS — BODY MASS INDEX: 26.1 KG/M2

## 2017-10-18 DIAGNOSIS — F13.20: Primary | ICD-10-CM

## 2017-10-18 DIAGNOSIS — F10.230: ICD-10-CM

## 2017-10-18 DIAGNOSIS — F10.24: ICD-10-CM

## 2017-10-18 DIAGNOSIS — F17.210: ICD-10-CM

## 2017-10-18 DIAGNOSIS — F39: ICD-10-CM

## 2017-10-18 DIAGNOSIS — F31.9: ICD-10-CM

## 2017-10-18 DIAGNOSIS — R21: ICD-10-CM

## 2017-10-18 DIAGNOSIS — F19.24: ICD-10-CM

## 2017-10-18 DIAGNOSIS — F14.10: ICD-10-CM

## 2017-10-18 DIAGNOSIS — R76.11: ICD-10-CM

## 2017-10-18 DIAGNOSIS — Z89.432: ICD-10-CM

## 2017-10-18 DIAGNOSIS — F10.280: ICD-10-CM

## 2017-10-18 NOTE — HP
CIWA Score





- CIWA Score


Nausea/Vomiting: 3


Muscle Tremors: 3


Anxiety: 3


Agitation: 2


Paroxysmal Sweats: 2


Orientation: 0-Oriented


Tacttile Disturbances: 2-Mild Itch/Numbness/Burn


Auditory Disturbances: 2-Mild Harshness/Frighten


Visual Disturbances: 2-Mild Sensitivity


Headache: 2-Mild


CIWA-Ar Total Score: 21





Admission ROS [facility]





- HPI


Chief Complaint: 


i need elp to stop drinking alcohol


Allergies/Adverse Reactions: 


 Allergies











Allergy/AdvReac Type Severity Reaction Status Date / Time


 


No Known Allergies Allergy   Verified 17 14:42











History of Present Illness: 


this 47 years old male with alcohol dependence,seeking detox,last treatment  to [facility]


syncope alcohol related


s/p transmetatarsal amputation


longest period of obriety 5 years


Exam Limitations: No Limitations





- Ebola screening


Have you been sick,other than usual withdrawal symptoms: No





- Review of Systems


Constitutional: Loss of Appetite, Malaise, Night Sweats, Changes in sleep, 

Weakness


EENT: reports: Nose Congestion


Respiratory: reports: No Symptoms reported


Cardiac: reports: Palpitations


GI: reports: Nausea, Abdominal cramping


: reports: No Symptoms Reported


Musculoskeletal: reports: Back Pain, Muscle Pain, Other (s/p transmetatarsal 

amputation of left foot)


Integumentary: reports: Dryness


Neuro: reports: Headache, Tremors


Endocrine: reports: No Symptoms Reported


Hematology: reports: No Symptoms Reported


Psychiatric: reports: No Sypmtoms Reported


Other Systems: Reviewed and Negative





Patient History





- Patient Medical History


Hx Anemia: No


Hx Asthma: No


Hx Chronic Obstructive Pulmonary Disease (COPD): No


Hx Cancer: No


Hx Cardiac Disorders: No


Hx Congestive Heart Failure: No


Hx Hypertension: No


Hx Hypercholesterolemia: No


Hx Pacemaker: No


HX Cerebrovascular Accident: No


Hx Seizures: No


Hx Dementia: No


Hx Diabetes: No


Hx Gastrointestinal Disorders: No


Hx Liver Disease: No


Hx Genitourinary Disorders: No


Hx Sexually Transmitted Disorders: No


Hx Renal Disease (ESRD): No


Hx Thyroid Disease: No


Hx Human Immunodeficiency Virus (HIV): No (last negative)


Hx Hepatitis C: No


Hx Depression: No


Hx Suicide Attempt: No


Hx Bipolar Disorder: No


Hx Schizophrenia: No


Other Medical History: no suicidal,no homicidal





- Patient Surgical History


Past Surgical History: Yes


Hx Neurologic Surgery: No


Hx Cataract Extraction: No


Hx Cardiac Surgery: No


Hx Lung Surgery: No


Hx Breast Surgery: No


Hx Breast Biopsy: No


Hx Abdominal Surgery: No


Hx Appendectomy: No


Hx Cholecystectomy: No


Hx Genitourinary Surgery: No


Hx  Section: No


Hx Orthopedic Surgery: Yes (traumatic transmetatarsal amputation of left foot 

in  in [facility])


Hx Hysterectomy: No


Other Surgical History: h/o nasal and rt rib fx's in past 


Anesthesia Reaction: No





- PPD History


Previous Implant?: Yes


Documented Results: Positive w/proof


Date: 18


Results: positive


PPD to be Administered?: No





- Smoking Cessation


Smoking history: Never smoked


Have you smoked in the past 12 months: No


Aproximately how many cigarettes per day: 0


If you are a former smoker, when did you quit?: more than year ago


Cigars Per Day: 0


Hx Chewing Tobacco Use: No





- Substance & Tx. History


Hx Alcohol Use: Yes


Hx Substance Use: No


Substance Use Type: Alcohol


Hx Substance Use Treatment: Yes ([facility] 17 to [facility])





- Substances Abused


  ** Alcohol


Route: Oral


Frequency: Daily


Amount used: 3pints of vodka/2 of 6 packs of 12 ozs of beer


Age of first use: 15


Date of Last Use: 10/19/17





Family Disease History





- Family Disease History


Family Disease History: Respiratory: Father (living, ALCOHOL in past), Other: 

Mother (living, RA), Brother (two - living - healthy), Sister (one - living - 

healthy), Daughter (one - age 18 - healthy)





Admission Physical Exam [facility]





- Vital Signs


Vital Signs: 


 Vital Signs - 24 hr











  10/18/17





  23:53


 


Temperature 97.1 F L


 


Pulse Rate 99 H


 


Respiratory 18





Rate 


 


Blood Pressure 141/87














- Physical


General Appearance: Yes: Moderate Distress, Tremorous, Irritable, Sweating, 

Anxious


HEENTM: Yes: Normal ENT Inspection, SHANNON, Pharynx Normal, Other (abrasion of 

right uppe and lower eyelid)


Respiratory: Yes: Lungs Clear, Normal Breath Sounds, No Respiratory Distress


Neck: Yes: Within Normal Limits, Supple, Trachea in good position


Breast: Yes: Within Normal Limits


Cardiology: Yes: Within Normal Limits, Regular Rhythm, Regular Rate, S1, S2


Abdominal: Yes: Within Normal Limits, Normal Bowel Sounds, Non Tender, Flat, 

Soft


Genitourinary: Yes: Within Normal Limits


Back: Yes: Muscle Spasm


Musculoskeletal: Yes: full range of Motion, Back pain


Extremities: Yes: Within Normal Limits, Tremors


Neurological: Yes: CNs II-XII NML intact, Fully Oriented, Alert, Motor Strength 

5/5


Integumentary: Yes: Dry


Lymphatic: Yes: Within Normal Limits





- Diagnostic


(1) Alcohol dependence with uncomplicated withdrawal


Status: Chronic





(2) Nicotine dependence


Status: Acute   Qualifiers: 


     Nicotine product type: cigarettes     Substance use status: uncomplicated 

    Qualified Code(s): F17.210 - Nicotine dependence, cigarettes, uncomplicated

; F17.210 - Nicotine dependence, cigarettes, uncomplicated  





(3) Syncope


Status: Acute   





(4) PPD positive


Status: Chronic


Comment: treated for two months








(5) Status post transmetatarsal amputation of left foot


Status: Chronic





(6) Bipolar disorder


Status: Suspected   Qualifiers: 


     Active/Remission status: currently active


Comment: Historical diagnosis.











Cleared for Admission [facility]





- Detox or Rehab


[facility] Level of Care: Medically Managed


Detox Regimen/Protocol: Librium





[facility] Breath Alcohol Content


Breath Alcohol Content: 0.227





Urine Drug Screen





- Results


Drug Screen Negative: No


Urine Drug Screen Results: BZO-Benzodiazepines

## 2017-10-19 PROCEDURE — HZ2ZZZZ DETOXIFICATION SERVICES FOR SUBSTANCE ABUSE TREATMENT: ICD-10-PCS | Performed by: INTERNAL MEDICINE

## 2017-10-19 RX ADMIN — Medication SCH MG: at 22:14

## 2017-10-19 RX ADMIN — Medication SCH TAB: at 10:54

## 2017-10-19 NOTE — PN
[facility]HS CIWA





- CIWA Score


Nausea/Vomiting: 3


Muscle Tremors: 4-Moderate,w/Arms Extend


Anxiety: 4-Mod. Anxious/Guarded


Agitation: 3


Paroxysmal Sweats: 3


Orientation: 0-Oriented


Tacttile Disturbances: 1-Very Mild Itch/Numbness


Auditory Disturbances: 0-None


Visual Disturbances: 0-None


Headache: 2-Mild


CIWA-Ar Total Score: 20





BHS Progress Note (SOAP)


Subjective: 


Nausea, sweating, chills, tremor, anxious, interrupted sleep


Objective: 


10/19/17 13:13


 Last Vital Signs











Temp Pulse Resp BP Pulse Ox


 


 99.0 F   89   18   137/64    


 


 10/19/17 13:11  10/19/17 13:11  10/19/17 13:11  10/19/17 13:11   








Admission labs noted: all within normal limit


Assessment: 





10/19/17 13:16


Withdrawal symptoms


Plan: 


Continue detox

## 2017-10-19 NOTE — EKG
Test Reason : 

Blood Pressure : ***/*** mmHG

Vent. Rate : 085 BPM     Atrial Rate : 085 BPM

   P-R Int : 138 ms          QRS Dur : 098 ms

    QT Int : 370 ms       P-R-T Axes : 061 039 021 degrees

   QTc Int : 440 ms

 

NORMAL SINUS RHYTHM

NORMAL ECG

WHEN COMPARED WITH ECG OF 19-AUG-2017 20:13,

NO SIGNIFICANT CHANGE WAS FOUND

Confirmed by KEDAR GUTIERREZ MD ([id number]) on 10/19/2017 1:06:36 PM

 

Referred By: Itz Guaman           Confirmed By:KEDAR GUTIERREZ MD

## 2017-10-19 NOTE — CONSULT
[facility] Psychiatric Consult





- Data


Date of interview: 10/19/17


Admission source: [facility]


Identifying data: This is 47 years old male with no psychiatric hospitalization 

history intoxicated with:  Alcohol, Xanax, history of Coxcaine abuse as well


Substance Abuse History: Urine Drug Screen Results: BZO-Benzodiazepines.  - 

Smoking Cessation.  Smoking history: Never smoked.  Have you smoked in the past 

12 months: No.  Aproximately how many cigarettes per day: 0.  If you are a 

former smoker, when did you quit?: more than year ago.  Cigars Per Day: 0.  Hx 

Chewing Tobacco Use: No.  - Substance & Tx. History.  Hx Alcohol Use: Yes.  Hx 

Substance Use: No.  Substance Use Type: Alcohol.  Hx Substance Use Treatment: 

Yes ([facility] 08/19/17 to 08/22/17).  - Substances Abused.  ** Alcohol.  Route: 

Oral.  Frequency: Daily.  Amount used: 3pints of vodka/2 of 6 packs of 12 ozs 

of beer.  Age of first use: 15.  Date of Last Use: 10/19/17


Medical History: Syncope history, Weight loss histopry, PPD+ history, Left foot 

metatarsal amputation history


Psychiatric History: Asd per com[puter there is a history of Bipolar Diosorder, 

patient denies history of Bipolar Disorder, reports no medicationms taking 

prior to admission


Physical/Sexual Abuse/Trauma History: Denies


Additional Comment: Urine Drug Screen Results: BZO-Benzodiazepines





Mental Status Exam





- Mental Status Exam


Alert and Oriented to: Person


Cognitive Function: Fair


Patient Appearance: Unkempt


Mood: Sad


Affect: Flat


Patient Behavior: Sedated


Speech Pattern: Pressured


Voice Loudness: Mildly Loud


Thought Process: Goal Oriented


Thought Disorder: Being Controlled


Hallucinations: Denies


Suicidal Ideation: Denies


Homicidal Ideation: Denies


Insight/Judgement: Fair


Sleep: Difficulty falling asleep


Appetite: Fair


Muscle strength/Tone: Normal


Gait/Station: Normal


Additional Comments: Observation.  Detox Unit Care Protocol





Psychiatric Findings





- Problem List (Axis 1, 2,3)


(1) Alcohol dependence


Current Visit: No   Status: Acute   





(2) Alcohol-induced anxiety disorder


Current Visit: No   Status: Acute





(3) Alcohol-induced mood disorder


Current Visit: No   Status: Acute





(4) Mood disorder


Current Visit: No   Status: Acute





(5) Nicotine dependence


Current Visit: No   Status: Acute   Qualifiers: 


     Nicotine product type: cigarettes     Substance use status: uncomplicated 

    Qualified Code(s): F17.210 - Nicotine dependence, cigarettes, uncomplicated

; F17.210 - Nicotine dependence, cigarettes, uncomplicated  





(6) Alcohol dependence with uncomplicated withdrawal


Current Visit: No   Status: Chronic





(7) Alcohol dependence, episodic drinking behavior


Current Visit: No   Status: Chronic





(8) Cocaine abuse


Current Visit: No   Status: Chronic





(9) Sedative dependence


Current Visit: No   Status: Chronic





(10) Bipolar disorder


Current Visit: No   Status: Suspected   


Comment: Historical diagnosis.








(11) Drug-induced mood disorder


Current Visit: No   Status: Suspected








- Initial Treatment Plan


Initial Treatment Plan: Observation.  Detox Unit Care Protocol

## 2017-10-20 LAB
ALBUMIN SERPL-MCNC: 3.5 G/DL (ref 3.4–5)
ALP SERPL-CCNC: 81 U/L (ref 45–117)
ALT SERPL-CCNC: 40 U/L (ref 12–78)
ANION GAP SERPL CALC-SCNC: 5 MMOL/L (ref 8–16)
AST SERPL-CCNC: 26 U/L (ref 15–37)
BILIRUB SERPL-MCNC: 0.5 MG/DL (ref 0.2–1)
CALCIUM SERPL-MCNC: 9.2 MG/DL (ref 8.5–10.1)
CO2 SERPL-SCNC: 28 MMOL/L (ref 21–32)
CREAT SERPL-MCNC: 0.9 MG/DL (ref 0.7–1.3)
DEPRECATED RDW RBC AUTO: 15.2 % (ref 11.9–15.9)
GLUCOSE SERPL-MCNC: 100 MG/DL (ref 74–106)
MCH RBC QN AUTO: 28.2 PG (ref 25.7–33.7)
MCHC RBC AUTO-ENTMCNC: 33 G/DL (ref 32–35.9)
MCV RBC: 85.4 FL (ref 80–96)
PLATELET # BLD AUTO: 221 K/MM3 (ref 134–434)
PMV BLD: 7.9 FL (ref 7.5–11.1)
PROT SERPL-MCNC: 7.2 G/DL (ref 6.4–8.2)
WBC # BLD AUTO: 5.1 K/MM3 (ref 4–10)

## 2017-10-20 RX ADMIN — Medication SCH TAB: at 10:50

## 2017-10-20 RX ADMIN — Medication SCH MG: at 22:08

## 2017-10-20 NOTE — PN
[facility] CIWA





- CIWA Score


Nausea/Vomitin-No Nausea/No Vomiting


Muscle Tremors: 2


Anxiety: 3


Agitation: 2


Paroxysmal Sweats: 3


Orientation: 0-Oriented


Tacttile Disturbances: 0-None


Auditory Disturbances: 0-None


Visual Disturbances: 0-None


Headache: 0-None Present


CIWA-Ar Total Score: 10





[facility] Progress Note (SOAP)


Subjective: 


Sweating,interrupted sleep,restless,anxiety,tremors


Objective: 





10/20/17 11:15


 Vital Signs - 8 hr











  10/20/17 10/20/17





  04:03 06:33


 


Temperature  98.2 F


 


Pulse Rate  71


 


Respiratory 18 18





Rate  


 


Blood Pressure  141/75








 Laboratory Tests











  10/20/17 10/20/17





  07:10 07:50


 


WBC   5.1


 


RBC   4.59


 


Hgb   13.0


 


Hct   39.2


 


MCV   85.4


 


MCH   28.2


 


MCHC   33.0


 


RDW   15.2


 


Plt Count   221


 


MPV   7.9


 


Sodium  140 


 


Potassium  4.1 


 


Chloride  107 


 


Carbon Dioxide  28 


 


Anion Gap  5 L 


 


BUN  14 


 


Creatinine  0.9 


 


Creat Clearance w eGFR  > 60 


 


Random Glucose  100 


 


Calcium  9.2 


 


Total Bilirubin  0.5 


 


AST  26  D 


 


ALT  40 


 


Alkaline Phosphatase  81 


 


Total Protein  7.2 


 


Albumin  3.5 








labs noted


Assessment: 





10/20/17 11:16


Withdrawal sx.


Plan: 


Continue detox

## 2017-10-21 VITALS — TEMPERATURE: 97.9 F | SYSTOLIC BLOOD PRESSURE: 150 MMHG | HEART RATE: 92 BPM | DIASTOLIC BLOOD PRESSURE: 85 MMHG

## 2017-10-21 RX ADMIN — Medication SCH: at 11:08

## 2017-10-21 NOTE — PN
[facility] Progress Note (SOAP)


Subjective: 


Sweating,interrupted sleep,restless


Objective: 





10/21/17 10:25


 Vital Signs - 8 hr











  10/21/17 10/21/17 10/21/17





  03:56 06:00 09:24


 


Temperature  97.5 F L 97.9 F


 


Pulse Rate  93 H 92 H


 


Respiratory 18 18 20





Rate   


 


Blood Pressure  145/92 150/85








 Laboratory Last Values











WBC  5.1 K/mm3 (4.0-10.0)   10/20/17  07:50    


 


RBC  4.59 M/mm3 (4.00-5.60)   10/20/17  07:50    


 


Hgb  13.0 GM/dL (11.7-16.9)   10/20/17  07:50    


 


Hct  39.2 % (35.4-49)   10/20/17  07:50    


 


MCV  85.4 fl (80-96)   10/20/17  07:50    


 


MCH  28.2 pg (25.7-33.7)   10/20/17  07:50    


 


MCHC  33.0 g/dl (32.0-35.9)   10/20/17  07:50    


 


RDW  15.2 % (11.9-15.9)   10/20/17  07:50    


 


Plt Count  221 K/MM3 (134-434)   10/20/17  07:50    


 


MPV  7.9 fl (7.5-11.1)   10/20/17  07:50    


 


Sodium  140 mmol/L (136-145)   10/20/17  07:10    


 


Potassium  4.1 mmol/L (3.5-5.1)   10/20/17  07:10    


 


Chloride  107 mmol/L ()   10/20/17  07:10    


 


Carbon Dioxide  28 mmol/L (21-32)   10/20/17  07:10    


 


Anion Gap  5  (8-16)  L  10/20/17  07:10    


 


BUN  14 mg/dL (7-18)   10/20/17  07:10    


 


Creatinine  0.9 mg/dL (0.7-1.3)   10/20/17  07:10    


 


Creat Clearance w eGFR  > 60  (>60)   10/20/17  07:10    


 


Random Glucose  100 mg/dL ()   10/20/17  07:10    


 


Calcium  9.2 mg/dL (8.5-10.1)   10/20/17  07:10    


 


Total Bilirubin  0.5 mg/dL (0.2-1.0)   10/20/17  07:10    


 


AST  26 U/L (15-37)  D 10/20/17  07:10    


 


ALT  40 U/L (12-78)   10/20/17  07:10    


 


Alkaline Phosphatase  81 U/L ()   10/20/17  07:10    


 


Total Protein  7.2 g/dl (6.4-8.2)   10/20/17  07:10    


 


Albumin  3.5 g/dl (3.4-5.0)   10/20/17  07:10    


 


RPR Titer  Nonreactive  (NONREACTIVE)   10/20/17  07:10    








labs noted


Assessment: 





10/21/17 10:26


Withdrawal sx.


Plan: 


Continue detox

## 2017-10-21 NOTE — DS
[facility] Detox Discharge Summary


Admission Date: 


10/19/17





Discharge Date: 10/21/17





- History


Present History: Alcohol Dependence


Pertinent Past History: 


Amputation of toes





- Physical Exam Results


Vital Signs: 


 Vital Signs











Temperature  97.9 F   10/21/17 09:24


 


Pulse Rate  92 H  10/21/17 09:24


 


Respiratory Rate  20   10/21/17 09:24


 


Blood Pressure  150/85   10/21/17 09:24


 


O2 Sat by Pulse Oximetry (%)      











Pertinent Admission Physical Exam Findings: 


Withdrawal sx.


 Laboratory Last Values











WBC  5.1 K/mm3 (4.0-10.0)   10/20/17  07:50    


 


RBC  4.59 M/mm3 (4.00-5.60)   10/20/17  07:50    


 


Hgb  13.0 GM/dL (11.7-16.9)   10/20/17  07:50    


 


Hct  39.2 % (35.4-49)   10/20/17  07:50    


 


MCV  85.4 fl (80-96)   10/20/17  07:50    


 


MCH  28.2 pg (25.7-33.7)   10/20/17  07:50    


 


MCHC  33.0 g/dl (32.0-35.9)   10/20/17  07:50    


 


RDW  15.2 % (11.9-15.9)   10/20/17  07:50    


 


Plt Count  221 K/MM3 (134-434)   10/20/17  07:50    


 


MPV  7.9 fl (7.5-11.1)   10/20/17  07:50    


 


Sodium  140 mmol/L (136-145)   10/20/17  07:10    


 


Potassium  4.1 mmol/L (3.5-5.1)   10/20/17  07:10    


 


Chloride  107 mmol/L ()   10/20/17  07:10    


 


Carbon Dioxide  28 mmol/L (21-32)   10/20/17  07:10    


 


Anion Gap  5  (8-16)  L  10/20/17  07:10    


 


BUN  14 mg/dL (7-18)   10/20/17  07:10    


 


Creatinine  0.9 mg/dL (0.7-1.3)   10/20/17  07:10    


 


Creat Clearance w eGFR  > 60  (>60)   10/20/17  07:10    


 


Random Glucose  100 mg/dL ()   10/20/17  07:10    


 


Calcium  9.2 mg/dL (8.5-10.1)   10/20/17  07:10    


 


Total Bilirubin  0.5 mg/dL (0.2-1.0)   10/20/17  07:10    


 


AST  26 U/L (15-37)  D 10/20/17  07:10    


 


ALT  40 U/L (12-78)   10/20/17  07:10    


 


Alkaline Phosphatase  81 U/L ()   10/20/17  07:10    


 


Total Protein  7.2 g/dl (6.4-8.2)   10/20/17  07:10    


 


Albumin  3.5 g/dl (3.4-5.0)   10/20/17  07:10    


 


RPR Titer  Nonreactive  (NONREACTIVE)   10/20/17  07:10    








labs noted





- Treatment


Patient has Accepted a Rehab Referral to: [facility]





- Medication


Discharge Medications: 


Ambulatory Orders





NK [No Known Home Medication]  10/18/17 











- Diagnosis


(1) Nicotine dependence


Current Visit: Yes   Status: Acute   Qualifiers: 


     Nicotine product type: cigarettes     Substance use status: uncomplicated 

    Qualified Code(s): F17.210 - Nicotine dependence, cigarettes, uncomplicated

; F17.210 - Nicotine dependence, cigarettes, uncomplicated  





(2) Alcohol dependence with uncomplicated withdrawal


Current Visit: Yes   Status: Chronic





(3) Alcohol-induced anxiety disorder


Current Visit: Yes   Status: Acute





(4) Alcohol-induced mood disorder


Current Visit: Yes   Status: Acute





(5) Facial rash


Current Visit: Yes   Status: Acute





(6) Bipolar disorder


Current Visit: Yes   Status: Suspected   Qualifiers: 


     Active/Remission status: currently active





(7) Drug-induced mood disorder


Current Visit: Yes   Status: Suspected








- AMA


Did Patient Leave Against Medical Advice: Yes

## 2017-12-10 ENCOUNTER — HOSPITAL ENCOUNTER (INPATIENT)
Dept: HOSPITAL 74 - YASAS | Age: 47
LOS: 4 days | Discharge: HOME | End: 2017-12-14
Attending: INTERNAL MEDICINE | Admitting: INTERNAL MEDICINE
Payer: COMMERCIAL

## 2017-12-10 VITALS — BODY MASS INDEX: 27.6 KG/M2

## 2017-12-10 DIAGNOSIS — F10.24: ICD-10-CM

## 2017-12-10 DIAGNOSIS — F10.280: ICD-10-CM

## 2017-12-10 DIAGNOSIS — F39: ICD-10-CM

## 2017-12-10 DIAGNOSIS — F17.210: ICD-10-CM

## 2017-12-10 DIAGNOSIS — R55: ICD-10-CM

## 2017-12-10 DIAGNOSIS — F13.10: Primary | ICD-10-CM

## 2017-12-10 DIAGNOSIS — R63.4: ICD-10-CM

## 2017-12-10 DIAGNOSIS — F14.10: ICD-10-CM

## 2017-12-10 DIAGNOSIS — F31.81: ICD-10-CM

## 2017-12-10 DIAGNOSIS — F10.230: ICD-10-CM

## 2017-12-10 DIAGNOSIS — F19.24: ICD-10-CM

## 2017-12-10 DIAGNOSIS — S98.132D: ICD-10-CM

## 2017-12-10 LAB
APPEARANCE UR: CLEAR
BILIRUB UR STRIP.AUTO-MCNC: NEGATIVE MG/DL
COLOR UR: (no result)
KETONES UR QL STRIP: NEGATIVE
LEUKOCYTE ESTERASE UR QL STRIP.AUTO: NEGATIVE
LEUKOCYTE ESTERASE UR QL STRIP: NEGATIVE
NITRITE UR QL STRIP: NEGATIVE
PH UR: 5 [PH] (ref 5–8)
PROT UR QL STRIP: NEGATIVE
PROT UR QL STRIP: NEGATIVE
RBC # UR STRIP: NEGATIVE /UL
SP GR UR: 1.02 (ref 1–1.03)
UROBILINOGEN UR STRIP-MCNC: NEGATIVE MG/DL (ref 0.2–1)

## 2017-12-10 PROCEDURE — HZ2ZZZZ DETOXIFICATION SERVICES FOR SUBSTANCE ABUSE TREATMENT: ICD-10-PCS | Performed by: INTERNAL MEDICINE

## 2017-12-10 RX ADMIN — Medication SCH MG: at 22:12

## 2017-12-10 NOTE — HP
CIWA Score





- CIWA Score


Nausea/Vomiting: 3


Muscle Tremors: 4-Moderate,w/Arms Extend


Anxiety: 4-Mod. Anxious/Guarded


Agitation: 4-Moderately Restless


Paroxysmal Sweats: 3


Orientation: 0-Oriented


Tacttile Disturbances: 0-None


Auditory Disturbances: 0-None


Visual Disturbances: 0-None


Headache: 0-None Present


CIWA-Ar Total Score: 18





Admission ROS [facility]





- HPI


Chief Complaint: 





Withdrawal sx.


Allergies/Adverse Reactions: 


 Allergies











Allergy/AdvReac Type Severity Reaction Status Date / Time


 


No Known Allergies Allergy   Verified 12/10/17 15:33











History of Present Illness: 





46 y/o man with a long hx.of alcoholism is admitted for detox. Pt. has been in 

previous detox denies significant sobriety.


Exam Limitations: No Limitations





- Ebola screening


Have you traveled outside of the country in the last 21 days: No


Have you had contact with anyone from an Ebola affected area: No


Have you been sick,other than usual withdrawal symptoms: No


Do you have a fever: No





- Review of Systems


Constitutional: Diaphoresis


EENT: reports: No Symptoms Reported


Respiratory: reports: No Symptoms reported


Cardiac: reports: No Symptoms Reported


GI: reports: Nausea, Abdominal cramping


: reports: No Symptoms Reported


Musculoskeletal: reports: No Symptoms Reported


Integumentary: reports: Sweating


Neuro: reports: Tremors, Other (frequent blackouts)


Endocrine: reports: No Symptoms Reported


Hematology: reports: No Symptoms Reported


Psychiatric: reports: No Sypmtoms Reported


Other Systems: Reviewed and Negative





Patient History





- Patient Medical History


Hx Anemia: No


Hx Asthma: No


Hx Chronic Obstructive Pulmonary Disease (COPD): No


Hx Cancer: No


Hx Cardiac Disorders: No


Hx Congestive Heart Failure: No


Hx Hypertension: No


Hx Hypercholesterolemia: No


Hx Pacemaker: No


HX Cerebrovascular Accident: No


Hx Seizures: No


Hx Dementia: No


Hx Diabetes: No


Hx Gastrointestinal Disorders: No


Hx Liver Disease: No


Hx Genitourinary Disorders: No


Hx Sexually Transmitted Disorders: No


Hx Renal Disease (ESRD): No


Hx Thyroid Disease: No


Hx Human Immunodeficiency Virus (HIV): No


Hx Hepatitis C: No


Hx Depression: No


Hx Suicide Attempt: No


Hx Bipolar Disorder: No


Hx Schizophrenia: No





- Patient Surgical History


Past Surgical History: Yes


Hx Neurologic Surgery: No


Hx Cataract Extraction: No


Hx Cardiac Surgery: No


Hx Lung Surgery: No


Hx Breast Surgery: No


Hx Breast Biopsy: No


Hx Abdominal Surgery: No


Hx Appendectomy: No


Hx Cholecystectomy: No


Hx Genitourinary Surgery: No


Hx  Section: No


Hx Orthopedic Surgery: Yes (traumatic transmetatarsal amputation of left foot 

in  in [facility])


Hx Hysterectomy: No


Other Surgical History: h/o nasal and rt rib fx's in past 


Anesthesia Reaction: No





- PPD History


Previous Implant?: Yes


Documented Results: Positive w/proof


Implanted On Prior [facility] Admission?: Yes


Date: 17 (CX-Ray)


Results: positive


PPD to be Administered?: Yes





- Smoking Cessation


Smoking history: Never smoked


Have you smoked in the past 12 months: No


Aproximately how many cigarettes per day: 0


If you are a former smoker, when did you quit?: more than year ago


Cigars Per Day: 0


Hx Chewing Tobacco Use: No





- Substance & Tx. History


Hx Alcohol Use: Yes


Hx Substance Use: No


Substance Use Type: Alcohol


Hx Substance Use Treatment: Yes





- Substances Abused


  ** Alcohol


Route: Oral


Frequency: 3-6 times per week


Amount used: BEER(1 CASE-12-16 OZ CANS)/VODKA(1 LITER)


Age of first use: 16


Date of Last Use: 12/10/17





Family Disease History





- Family Disease History


Family Disease History: Respiratory: Father (living, ALCOHOL in past), Other: 

Mother (living, RA), Brother (two - living - healthy), Sister (one - living - 

healthy), Daughter (one - age 18 - healthy)





Admission Physical Exam [facility]





- Vital Signs


Vital Signs: 


 Vital Signs - 24 hr











  12/10/17





  14:32


 


Temperature 98 F


 


Pulse Rate 100 H


 


Respiratory 18





Rate 


 


Blood Pressure 170/101














- Physical


General Appearance: Yes: Alcohol on Breath, Tremorous, Irritable, Sweating, 

Anxious


HEENTM: Yes: Within Normal Limits


Respiratory: Yes: Chest Non-Tender, Lungs Clear, Normal Breath Sounds


Neck: Yes: Supple


Breast: Yes: Breast Exam Deferred


Cardiology: Yes: Regular Rhythm, Regular Rate, S1, S2


Abdominal: Yes: Within Normal Limits


Genitourinary: Yes: Within Normal Limits


Back: Yes: Within Normal Limits


Musculoskeletal: Yes: Within Normal Limits


Extremities: Yes: Tremors


Neurological: Yes: Fully Oriented, Alert


Integumentary: Yes: Diaphoresis


Lymphatic: Yes: Within Normal Limits





- Diagnostic


(1) Alcohol dependence with uncomplicated withdrawal


Current Visit: Yes   Status: Chronic   





Cleared for Admission [facility]





- Detox or Rehab


[facility] Level of Care: Medically Managed


Detox Regimen/Protocol: Librium





[facility] Breath Alcohol Content


Breath Alcohol Content: 0.122





Urine Drug Screen





- Results


Drug Screen Negative: No


Urine Drug Screen Results: BZO-Benzodiazepines

## 2017-12-11 LAB
ALBUMIN SERPL-MCNC: 4.3 G/DL (ref 3.4–5)
ALP SERPL-CCNC: 65 U/L (ref 45–117)
ALT SERPL-CCNC: 36 U/L (ref 12–78)
ANION GAP SERPL CALC-SCNC: 7 MMOL/L (ref 8–16)
AST SERPL-CCNC: 21 U/L (ref 15–37)
BILIRUB SERPL-MCNC: 0.8 MG/DL (ref 0.2–1)
CALCIUM SERPL-MCNC: 10.2 MG/DL (ref 8.5–10.1)
CO2 SERPL-SCNC: 28 MMOL/L (ref 21–32)
CREAT SERPL-MCNC: 1.1 MG/DL (ref 0.7–1.3)
DEPRECATED RDW RBC AUTO: 14.3 % (ref 11.9–15.9)
GLUCOSE SERPL-MCNC: 97 MG/DL (ref 74–106)
MCH RBC QN AUTO: 28.6 PG (ref 25.7–33.7)
MCHC RBC AUTO-ENTMCNC: 33.5 G/DL (ref 32–35.9)
MCV RBC: 85.1 FL (ref 80–96)
PLATELET # BLD AUTO: 238 K/MM3 (ref 134–434)
PMV BLD: 7.9 FL (ref 7.5–11.1)
PROT SERPL-MCNC: 8.2 G/DL (ref 6.4–8.2)
WBC # BLD AUTO: 4.9 K/MM3 (ref 4–10)

## 2017-12-11 RX ADMIN — Medication SCH MG: at 22:36

## 2017-12-11 RX ADMIN — HYDROXYZINE PAMOATE PRN MG: 50 CAPSULE ORAL at 22:36

## 2017-12-11 RX ADMIN — Medication SCH TAB: at 10:26

## 2017-12-11 NOTE — PN
S CIWA





- CIWA Score


Nausea/Vomitin-No Nausea/No Vomiting


Muscle Tremors: 4-Moderate,w/Arms Extend


Anxiety: 3


Agitation: 3


Paroxysmal Sweats: 3


Orientation: 0-Oriented


Tacttile Disturbances: 0-None


Auditory Disturbances: 0-None


Visual Disturbances: 0-None


Headache: 0-None Present


CIWA-Ar Total Score: 13





[facility] Progress Note (SOAP)


Subjective: 





sweats


shakes


interrupted sleep


agitation


anxiety


Objective: 





17 12:02


 Vital Signs











Temperature  97.7 F   17 10:00


 


Pulse Rate  85   17 10:00


 


Respiratory Rate  18   17 10:00


 


Blood Pressure  153/94   17 10:00


 


O2 Sat by Pulse Oximetry (%)      








 Laboratory Tests











  12/10/17 12/11/17 12/11/17





  18:00 07:00 07:00


 


WBC   4.9 


 


RBC   4.89 


 


Hgb   14.0 


 


Hct   41.7 


 


MCV   85.1 


 


MCH   28.6 


 


MCHC   33.5 


 


RDW   14.3 


 


Plt Count   238 


 


MPV   7.9 


 


Sodium    139


 


Potassium    4.5


 


Chloride    104


 


Carbon Dioxide    28


 


Anion Gap    7 L


 


BUN    18  D


 


Creatinine    1.1  D


 


Creat Clearance w eGFR    > 60


 


Random Glucose    97


 


Calcium    10.2 H


 


Total Bilirubin    0.8  D


 


AST    21


 


ALT    36


 


Alkaline Phosphatase    65


 


Total Protein    8.2


 


Albumin    4.3  D


 


Urine Color  Ltyellow  


 


Urine Appearance  Clear  


 


Urine pH  5.0  


 


Ur Specific Gravity  1.017  


 


Urine Protein  Negative  


 


Urine Glucose (UA)  Negative  


 


Urine Ketones  Negative  


 


Urine Blood  Negative  


 


Urine Nitrite  Negative  


 


Urine Bilirubin  Negative  


 


Urine Urobilinogen  Negative  


 


Ur Leukocyte Esterase  Negative  


 


RPR Titer   














  17





  07:00


 


WBC 


 


RBC 


 


Hgb 


 


Hct 


 


MCV 


 


MCH 


 


MCHC 


 


RDW 


 


Plt Count 


 


MPV 


 


Sodium 


 


Potassium 


 


Chloride 


 


Carbon Dioxide 


 


Anion Gap 


 


BUN 


 


Creatinine 


 


Creat Clearance w eGFR 


 


Random Glucose 


 


Calcium 


 


Total Bilirubin 


 


AST 


 


ALT 


 


Alkaline Phosphatase 


 


Total Protein 


 


Albumin 


 


Urine Color 


 


Urine Appearance 


 


Urine pH 


 


Ur Specific Gravity 


 


Urine Protein 


 


Urine Glucose (UA) 


 


Urine Ketones 


 


Urine Blood 


 


Urine Nitrite 


 


Urine Bilirubin 


 


Urine Urobilinogen 


 


Ur Leukocyte Esterase 


 


RPR Titer  Nonreactive








aaox3


ambulating


no acute distress


Assessment: 





17 12:02


withdrawal sx


Plan: 





continue detox


increase fluids


clonidine 0.1mg x one

## 2017-12-11 NOTE — EKG
Test Reason : 

Blood Pressure : ***/*** mmHG

Vent. Rate : 081 BPM     Atrial Rate : 081 BPM

   P-R Int : 140 ms          QRS Dur : 104 ms

    QT Int : 372 ms       P-R-T Axes : 074 075 045 degrees

   QTc Int : 432 ms

 

NORMAL SINUS RHYTHM

NORMAL ECG

WHEN COMPARED WITH ECG OF 19-OCT-2017 01:03,

NO SIGNIFICANT CHANGE WAS FOUND

Confirmed by CARLOS MAXWELL MD ([id number]) on 12/11/2017 3:13:37 PM

 

Referred By:             Confirmed By:CARLOS MAXWELL MD

## 2017-12-12 RX ADMIN — Medication SCH TAB: at 10:10

## 2017-12-12 RX ADMIN — LOPERAMIDE HYDROCHLORIDE PRN MG: 2 CAPSULE ORAL at 22:28

## 2017-12-12 RX ADMIN — HYDROXYZINE PAMOATE PRN MG: 50 CAPSULE ORAL at 22:28

## 2017-12-12 RX ADMIN — Medication SCH MG: at 22:29

## 2017-12-12 RX ADMIN — LOPERAMIDE HYDROCHLORIDE PRN MG: 2 CAPSULE ORAL at 10:12

## 2017-12-13 RX ADMIN — HYDROXYZINE PAMOATE PRN MG: 50 CAPSULE ORAL at 14:36

## 2017-12-13 RX ADMIN — Medication SCH TAB: at 10:21

## 2017-12-13 RX ADMIN — Medication SCH: at 23:31

## 2017-12-13 NOTE — PN
[facility] Progress Note


Note: 





RECEIVED NURSE CALL THAT THE PATIENT REFUSES LIBRIUM 10 MG ONE DOSE TODAY


CHART REIVED


PATIENT DISCHARGE DATE 12/14/17

## 2017-12-13 NOTE — PN
[facility] Progress Note (SOAP)


Subjective: 





sweats


shakes


Objective: 





12/13/17 09:55


 Vital Signs











Temperature  97.7 F   12/13/17 07:07


 


Pulse Rate  69   12/13/17 07:07


 


Respiratory Rate  18   12/13/17 07:07


 


Blood Pressure  129/85   12/13/17 07:07


 


O2 Sat by Pulse Oximetry (%)      








aaox3


ambulating


no acute distress


Assessment: 





12/13/17 09:56


mild withdrawal sx


Plan: 





continue detox


d/c in am

## 2017-12-14 VITALS — TEMPERATURE: 97 F | DIASTOLIC BLOOD PRESSURE: 88 MMHG | SYSTOLIC BLOOD PRESSURE: 149 MMHG | HEART RATE: 80 BPM

## 2017-12-14 NOTE — DS
[facility] Detox Discharge Summary


Admission Date: 


12/10/17





Discharge Date: 12/14/17





- History


Present History: Alcohol Dependence





- Physical Exam Results


Vital Signs: 


 Vital Signs











Temperature  97.0 F L  12/14/17 06:33


 


Pulse Rate  80   12/14/17 06:33


 


Respiratory Rate  18   12/14/17 06:33


 


Blood Pressure  149/88   12/14/17 06:33


 


O2 Sat by Pulse Oximetry (%)      














- Treatment


Hospital Course: Detox Protocol Followed, Detoxed Safely, Responded well, 

Discharged Condition Good, Rehab Referral Accepted





- Medication


Discharge Medications: 


Ambulatory Orders





NK [No Known Home Medication]  10/18/17 











- Diagnosis


(1) Alcohol dependence with uncomplicated withdrawal


Status: Chronic   





(2) Alcohol-induced anxiety disorder


Status: Acute   





(3) Alcohol-induced mood disorder


Status: Acute   





(4) Depression


Status: Acute   





(5) Mood disorder


Status: Acute   





(6) Nicotine dependence


Status: Chronic   


Qualifiers: 


   Nicotine product type: cigarettes   Substance use status: uncomplicated   

Qualified Code(s): F17.210 - Nicotine dependence, cigarettes, uncomplicated   





(7) Syncope


Status: Acute   





(8) Weight loss


Status: Acute   





(9) Alcohol dependence, episodic drinking behavior


Status: Chronic   





(10) Amputation, traumatic, toes


Status: Chronic   


Qualifiers: 


   Encounter type: subsequent encounter   Laterality: left   Qualified Code(s): 

S98.132D - Complete traumatic amputation of one left lesser toe, subsequent 

encounter   





(11) Cocaine abuse


Status: Chronic   





(12) Mild alprazolam abuse


Status: Chronic   





(13) PPD positive


Status: Chronic   





(14) Sedative dependence


Status: Chronic   





(15) Status post transmetatarsal amputation of left foot


Status: Chronic   





(16) depression


Status: Chronic   





(17) Bipolar II disorder


Status: Suspected   





(18) Bipolar disorder


Status: Suspected   


Qualifiers: 


   Active/Remission status: currently active 





(19) Drug-induced mood disorder


Status: Suspected   





- AMA


Did Patient Leave Against Medical Advice: No

## 2018-02-21 ENCOUNTER — HOSPITAL ENCOUNTER (INPATIENT)
Dept: HOSPITAL 74 - YASAS | Age: 48
LOS: 13 days | Discharge: HOME | DRG: 772 | End: 2018-03-06
Attending: PSYCHIATRY & NEUROLOGY | Admitting: PSYCHIATRY & NEUROLOGY
Payer: COMMERCIAL

## 2018-02-21 VITALS — BODY MASS INDEX: 29.2 KG/M2

## 2018-02-21 DIAGNOSIS — F10.20: Primary | ICD-10-CM

## 2018-02-21 DIAGNOSIS — Z89.422: ICD-10-CM

## 2018-02-21 DIAGNOSIS — E66.3: ICD-10-CM

## 2018-02-21 LAB
APPEARANCE UR: CLEAR
BILIRUB UR STRIP.AUTO-MCNC: NEGATIVE MG/DL
COLOR UR: YELLOW
KETONES UR QL STRIP: NEGATIVE
LEUKOCYTE ESTERASE UR QL STRIP.AUTO: NEGATIVE
NITRITE UR QL STRIP: NEGATIVE
PH UR: 5 [PH] (ref 5–8)
PROT UR QL STRIP: NEGATIVE
PROT UR QL STRIP: NEGATIVE
RBC # UR STRIP: NEGATIVE /UL
SP GR UR: 1.02 (ref 1–1.03)
UROBILINOGEN UR STRIP-MCNC: NEGATIVE MG/DL (ref 0.2–1)

## 2018-02-21 PROCEDURE — HZ42ZZZ GROUP COUNSELING FOR SUBSTANCE ABUSE TREATMENT, COGNITIVE-BEHAVIORAL: ICD-10-PCS | Performed by: PSYCHIATRY & NEUROLOGY

## 2018-02-21 RX ADMIN — Medication SCH: at 22:23
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
## 2018-02-22 LAB
ALBUMIN SERPL-MCNC: 4.1 G/DL (ref 3.4–5)
ALP SERPL-CCNC: 87 U/L (ref 45–117)
ALT SERPL-CCNC: 44 U/L (ref 12–78)
ANION GAP SERPL CALC-SCNC: 8 MMOL/L (ref 8–16)
AST SERPL-CCNC: 27 U/L (ref 15–37)
BILIRUB SERPL-MCNC: 0.6 MG/DL (ref 0.2–1)
BUN SERPL-MCNC: 18 MG/DL (ref 7–18)
CALCIUM SERPL-MCNC: 8.8 MG/DL (ref 8.5–10.1)
CHLORIDE SERPL-SCNC: 103 MMOL/L (ref 98–107)
CO2 SERPL-SCNC: 27 MMOL/L (ref 21–32)
CREAT SERPL-MCNC: 1.1 MG/DL (ref 0.7–1.3)
DEPRECATED RDW RBC AUTO: 13.7 % (ref 11.9–15.9)
GLUCOSE SERPL-MCNC: 109 MG/DL (ref 74–106)
HCT VFR BLD CALC: 41.4 % (ref 35.4–49)
HGB BLD-MCNC: 13.9 GM/DL (ref 11.7–16.9)
MCH RBC QN AUTO: 28.5 PG (ref 25.7–33.7)
MCHC RBC AUTO-ENTMCNC: 33.7 G/DL (ref 32–35.9)
MCV RBC: 84.7 FL (ref 80–96)
PLATELET # BLD AUTO: 294 K/MM3 (ref 134–434)
PMV BLD: 8.3 FL (ref 7.5–11.1)
POTASSIUM SERPLBLD-SCNC: 3.9 MMOL/L (ref 3.5–5.1)
PROT SERPL-MCNC: 8.5 G/DL (ref 6.4–8.2)
RBC # BLD AUTO: 4.89 M/MM3 (ref 4–5.6)
SODIUM SERPL-SCNC: 138 MMOL/L (ref 136–145)
WBC # BLD AUTO: 6.1 K/MM3 (ref 4–10)

## 2018-02-22 RX ADMIN — HYDROXYZINE PAMOATE PRN MG: 50 CAPSULE ORAL at 21:32

## 2018-02-22 RX ADMIN — Medication SCH TAB: at 10:25

## 2018-02-22 RX ADMIN — Medication SCH MG: at 21:31

## 2018-02-22 NOTE — EKG
Test Reason : 

Blood Pressure : ***/*** mmHG

Vent. Rate : 068 BPM     Atrial Rate : 068 BPM

   P-R Int : 152 ms          QRS Dur : 098 ms

    QT Int : 376 ms       P-R-T Axes : 058 064 043 degrees

   QTc Int : 399 ms

 

NORMAL SINUS RHYTHM

NORMAL ECG

WHEN COMPARED WITH ECG OF 10-DEC-2017 16:34,

NO SIGNIFICANT CHANGE WAS FOUND

Confirmed by KEDAR GUTIERREZ MD ([id number]) on 2/22/2018 4:10:36 PM

 

Referred By:             Confirmed By:KEDAR GUTIERREZ MD

## 2018-02-22 NOTE — HP
Psychiatrist Admission





- Data


Date of interview: 02/22/18


Admission source: [facility]


Identifying data: This is the third inpatient rehabilitation admission for this 

47 year old sinle  male who is domiciled and unemployed.


Medical History: Patient reports left foot transmetatarsal amputation in may 

2017.


Psychiatric History: Patient reports no history of psychiatric hospitalizations

, states he visited [facility] while intoxicated with alcohol, several times and  was 

discharged after a few hours of observation> Reports in the past was treated 

Depakote 500 mg po daily,  Seroquel 200 mg po bid and Trazodone 100 mg po hs on 

an outpatient basis at the [facility] program.No prior psychiatric 

hospitalizations.The patient reports that he stopped medications  about 4 years 

ago, he  history of suicidal attempts. Reports has no complains now, his 

appetite is good and sleeps well.


Physical/Sexual Abuse/Trauma History: Denies history of sexual, physical and 

verbal abuse.


Vital Signs: 


 Vital Signs - 24 hr











  02/21/18 02/22/18 02/22/18





  15:44 00:30 03:30


 


Temperature 97.3 F L  


 


Pulse Rate 89  


 


Respiratory 20 16 16





Rate   


 


Blood Pressure 127/83  














  02/22/18





  06:54


 


Temperature 97.6 F


 


Pulse Rate 73


 


Respiratory 18





Rate 


 


Blood Pressure 128/74











Allergies/Adverse Reactions: 


 Allergies











Allergy/AdvReac Type Severity Reaction Status Date / Time


 


No Known Allergies Allergy   Verified 02/21/18 16:58











Date of last physical exam: 02/21/18


Concur with the findings of this exam: Yes





- Substance Abuse/Tx History


Hx Alcohol Use: Yes (beer and hard liquor)


Hx Substance Use: No


Substance Use Type: Alcohol


Hx Substance Use Treatment: Yes





Mental Status Exam





- Mental Status Exam


Alert and Oriented to: Time, Place, Person


Cognitive Function: Good


Patient Appearance: Well Groomed


Mood: Hopeful


Affect: Appropriate, Mood Congruent


Patient Behavior: Appropriate, Cooperative


Speech Pattern: Clear, Appropriate


Voice Loudness: Normal


Thought Process: Intact, Goal Oriented


Thought Disorder: Not Present


Hallucinations: Denies


Suicidal Ideation: Denies


Homicidal Ideation: Denies


Insight/Judgement: Fair


Sleep: Fair


Appetite: Fair


Muscle strength/Tone: Normal


Gait/Station: Normal





Psychiatric Findings





- Problem List (Axis 1, 2,3)


(1) Alcohol dependence


Current Visit: Yes   Status: Acute   


Qualifiers: 


   Substance use status: uncomplicated   Qualified Code(s): F10.20 - Alcohol 

dependence, uncomplicated   





- Initial Treatment Plan


Initial Treatment Plan: will monitor progress needed.

## 2018-02-23 RX ADMIN — IBUPROFEN PRN MG: 400 TABLET, FILM COATED ORAL at 12:45

## 2018-02-23 RX ADMIN — Medication SCH TAB: at 10:33

## 2018-02-23 RX ADMIN — Medication SCH MG: at 21:21

## 2018-02-24 RX ADMIN — HYDROXYZINE PAMOATE PRN MG: 50 CAPSULE ORAL at 14:34

## 2018-02-24 RX ADMIN — IBUPROFEN PRN MG: 400 TABLET, FILM COATED ORAL at 09:54

## 2018-02-24 RX ADMIN — HYDROXYZINE PAMOATE PRN MG: 50 CAPSULE ORAL at 21:21

## 2018-02-24 RX ADMIN — IBUPROFEN PRN MG: 400 TABLET, FILM COATED ORAL at 21:21

## 2018-02-24 RX ADMIN — Medication SCH TAB: at 09:54

## 2018-02-24 RX ADMIN — Medication SCH MG: at 21:20

## 2018-02-25 RX ADMIN — Medication SCH MG: at 21:20

## 2018-02-25 RX ADMIN — HYDROXYZINE PAMOATE PRN MG: 50 CAPSULE ORAL at 21:20

## 2018-02-25 RX ADMIN — Medication SCH TAB: at 10:08

## 2018-02-25 RX ADMIN — HYDROXYZINE PAMOATE PRN MG: 50 CAPSULE ORAL at 14:43

## 2018-02-25 RX ADMIN — IBUPROFEN PRN MG: 400 TABLET, FILM COATED ORAL at 10:08

## 2018-02-26 RX ADMIN — HYDROXYZINE PAMOATE PRN MG: 50 CAPSULE ORAL at 21:12

## 2018-02-26 RX ADMIN — Medication SCH TAB: at 10:03

## 2018-02-26 RX ADMIN — IBUPROFEN PRN MG: 400 TABLET, FILM COATED ORAL at 10:03

## 2018-02-26 RX ADMIN — Medication SCH MG: at 21:11

## 2018-02-27 RX ADMIN — HYDROXYZINE PAMOATE PRN MG: 50 CAPSULE ORAL at 21:21

## 2018-02-27 RX ADMIN — IBUPROFEN PRN MG: 400 TABLET, FILM COATED ORAL at 16:44

## 2018-02-27 RX ADMIN — Medication SCH TAB: at 09:51

## 2018-02-27 RX ADMIN — HYDROXYZINE PAMOATE PRN MG: 50 CAPSULE ORAL at 13:58

## 2018-02-27 RX ADMIN — Medication SCH MG: at 21:21

## 2018-02-28 RX ADMIN — HYDROXYZINE PAMOATE PRN MG: 50 CAPSULE ORAL at 09:05

## 2018-02-28 RX ADMIN — Medication SCH MG: at 21:21

## 2018-02-28 RX ADMIN — IBUPROFEN PRN MG: 400 TABLET, FILM COATED ORAL at 10:02

## 2018-02-28 RX ADMIN — HYDROXYZINE PAMOATE PRN MG: 50 CAPSULE ORAL at 21:21

## 2018-02-28 RX ADMIN — Medication SCH TAB: at 10:02

## 2018-03-01 RX ADMIN — Medication SCH TAB: at 10:00

## 2018-03-01 RX ADMIN — Medication SCH MG: at 21:16

## 2018-03-01 RX ADMIN — IBUPROFEN PRN MG: 400 TABLET, FILM COATED ORAL at 10:01

## 2018-03-01 RX ADMIN — HYDROXYZINE PAMOATE PRN MG: 50 CAPSULE ORAL at 21:16

## 2018-03-02 RX ADMIN — HYDROXYZINE PAMOATE PRN MG: 50 CAPSULE ORAL at 21:18

## 2018-03-02 RX ADMIN — Medication SCH TAB: at 09:56

## 2018-03-02 RX ADMIN — Medication SCH MG: at 21:18

## 2018-03-02 RX ADMIN — IBUPROFEN PRN MG: 400 TABLET, FILM COATED ORAL at 21:19

## 2018-03-03 RX ADMIN — Medication SCH MG: at 21:14

## 2018-03-03 RX ADMIN — HYDROXYZINE PAMOATE PRN MG: 50 CAPSULE ORAL at 21:14

## 2018-03-03 RX ADMIN — Medication SCH TAB: at 10:04

## 2018-03-03 RX ADMIN — IBUPROFEN PRN MG: 400 TABLET, FILM COATED ORAL at 10:04

## 2018-03-04 RX ADMIN — Medication SCH TAB: at 10:20

## 2018-03-04 RX ADMIN — IBUPROFEN PRN MG: 400 TABLET, FILM COATED ORAL at 10:21

## 2018-03-04 RX ADMIN — HYDROXYZINE PAMOATE PRN MG: 50 CAPSULE ORAL at 10:22

## 2018-03-04 RX ADMIN — Medication SCH MG: at 21:11

## 2018-03-05 RX ADMIN — Medication SCH MG: at 21:12

## 2018-03-05 RX ADMIN — Medication SCH TAB: at 10:00

## 2018-03-05 RX ADMIN — HYDROXYZINE PAMOATE PRN MG: 50 CAPSULE ORAL at 06:59

## 2018-03-05 RX ADMIN — IBUPROFEN PRN MG: 400 TABLET, FILM COATED ORAL at 14:38

## 2018-03-06 VITALS — TEMPERATURE: 98.3 F | DIASTOLIC BLOOD PRESSURE: 92 MMHG | SYSTOLIC BLOOD PRESSURE: 135 MMHG | HEART RATE: 82 BPM

## 2018-03-06 RX ADMIN — Medication SCH TAB: at 09:39

## 2018-03-06 NOTE — PN
Psychiatric Progress Note


Vital Signs: 


 Vital Signs











 Period  Temp  Pulse  Resp  BP Sys/Johnson  Pulse Ox


 


 Last 24 Hr  98.3 F  82  18-18  135/92  











Date of Session: 03/06/18


Chief Complaint:: discharge visit


HPI: patient has addressed alcohol dependence


ROS: wnl


Current Side Effect: No


Lab tests ordered: No


Lab tests reviewed: Yes


Provider note:: Patient has completed today his treatment and met his goals, 

will contineu to address his issues at [facility] term treatment 

program. patient was encouraged to continue maintain abstinence and utilize all 

supportes to prevent relapses. patient is stable for discharge today.


Total face to face time:: 15





Mental Status Exam





- Mental Status Exam


Alert and Oriented to: Time, Place, Person


Cognitive Function: Good


Patient Appearance: Well Groomed


Mood: Hopeful


Affect: Appropriate, Mood Congruent


Patient Behavior: Appropriate, Cooperative


Speech Pattern: Clear, Appropriate


Voice Loudness: Normal


Thought Process: Intact, Goal Oriented


Thought Disorder: Not Present


Hallucinations: Denies


Suicidal Ideation: Denies


Homicidal Ideation: Denies


Insight/Judgement: Fair


Sleep: Fair


Appetite: Fair


Muscle strength/Tone: Normal


Gait/Station: Normal





Psychiatric Treatment Plan





- Problem List


(1) Alcohol dependence


Qualifiers: 


   Substance use status: uncomplicated   Qualified Code(s): F10.20 - Alcohol 

dependence, uncomplicated

## 2018-06-20 ENCOUNTER — HOSPITAL ENCOUNTER (INPATIENT)
Dept: HOSPITAL 74 - YASAS | Age: 48
LOS: 4 days | Discharge: HOME | End: 2018-06-24
Attending: INTERNAL MEDICINE | Admitting: INTERNAL MEDICINE
Payer: COMMERCIAL

## 2018-06-20 VITALS — BODY MASS INDEX: 30 KG/M2

## 2018-06-20 DIAGNOSIS — F10.230: Primary | ICD-10-CM

## 2018-06-20 DIAGNOSIS — F10.280: ICD-10-CM

## 2018-06-20 DIAGNOSIS — G47.00: ICD-10-CM

## 2018-06-20 DIAGNOSIS — F33.41: ICD-10-CM

## 2018-06-20 DIAGNOSIS — R76.11: ICD-10-CM

## 2018-06-20 DIAGNOSIS — Z89.432: ICD-10-CM

## 2018-06-20 DIAGNOSIS — F17.213: ICD-10-CM

## 2018-06-20 DIAGNOSIS — F10.24: ICD-10-CM

## 2018-06-20 DIAGNOSIS — F19.24: ICD-10-CM

## 2018-06-20 PROCEDURE — HZ2ZZZZ DETOXIFICATION SERVICES FOR SUBSTANCE ABUSE TREATMENT: ICD-10-PCS | Performed by: INTERNAL MEDICINE

## 2018-06-20 RX ADMIN — Medication SCH MG: at 22:28
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
## 2018-06-21 LAB
ALBUMIN SERPL-MCNC: 3.7 G/DL (ref 3.4–5)
ALP SERPL-CCNC: 73 U/L (ref 45–117)
ALT SERPL-CCNC: 97 U/L (ref 12–78)
ANION GAP SERPL CALC-SCNC: 8 MMOL/L (ref 8–16)
APPEARANCE UR: CLEAR
AST SERPL-CCNC: 46 U/L (ref 15–37)
BILIRUB SERPL-MCNC: 0.3 MG/DL (ref 0.2–1)
BILIRUB UR STRIP.AUTO-MCNC: NEGATIVE MG/DL
BUN SERPL-MCNC: 17 MG/DL (ref 7–18)
CALCIUM SERPL-MCNC: 9.3 MG/DL (ref 8.5–10.1)
CHLORIDE SERPL-SCNC: 104 MMOL/L (ref 98–107)
CO2 SERPL-SCNC: 30 MMOL/L (ref 21–32)
COLOR UR: (no result)
CREAT SERPL-MCNC: 1.1 MG/DL (ref 0.7–1.3)
DEPRECATED RDW RBC AUTO: 14.2 % (ref 11.9–15.9)
GLUCOSE SERPL-MCNC: 101 MG/DL (ref 74–106)
HCT VFR BLD CALC: 39.3 % (ref 35.4–49)
HGB BLD-MCNC: 13.4 GM/DL (ref 11.7–16.9)
KETONES UR QL STRIP: NEGATIVE
LEUKOCYTE ESTERASE UR QL STRIP.AUTO: NEGATIVE
MCH RBC QN AUTO: 28.9 PG (ref 25.7–33.7)
MCHC RBC AUTO-ENTMCNC: 34 G/DL (ref 32–35.9)
MCV RBC: 84.9 FL (ref 80–96)
NITRITE UR QL STRIP: NEGATIVE
PH UR: 5 [PH] (ref 5–8)
PLATELET # BLD AUTO: 206 K/MM3 (ref 134–434)
PMV BLD: 8 FL (ref 7.5–11.1)
POTASSIUM SERPLBLD-SCNC: 3.8 MMOL/L (ref 3.5–5.1)
PROT SERPL-MCNC: 7.6 G/DL (ref 6.4–8.2)
PROT UR QL STRIP: NEGATIVE
PROT UR QL STRIP: NEGATIVE
RBC # BLD AUTO: 4.63 M/MM3 (ref 4–5.6)
SODIUM SERPL-SCNC: 142 MMOL/L (ref 136–145)
SP GR UR: 1.02 (ref 1–1.03)
UROBILINOGEN UR STRIP-MCNC: NEGATIVE MG/DL (ref 0.2–1)
WBC # BLD AUTO: 5 K/MM3 (ref 4–10)

## 2018-06-21 RX ADMIN — Medication SCH TAB: at 10:08

## 2018-06-21 RX ADMIN — Medication SCH: at 22:26

## 2018-06-21 NOTE — PN
[facility] CIWA





- CIWA Score


Nausea/Vomitin-Mild Nausea/No Vomiting


Muscle Tremors: 1-None Visible, but Felt


Anxiety: 1-Mildly Anxious


Agitation: 0-Normal Activity


Paroxysmal Sweats: 1-Minimal Palms Moist


Orientation: 0-Oriented


Tacttile Disturbances: 0-None


Auditory Disturbances: 0-None


Visual Disturbances: 0-None


Headache: 0-None Present


CIWA-Ar Total Score: 4





[facility] Progress Note (SOAP)


Subjective: 





PATIENT ADMITTED 18 FOR ETOH WITHDRAWAL SYMPTOMS. PATIENT STATES HE HAS 

MILD NAUSEA, CHILLS AND MILD ANXIETY. DENIES CP, SOB AND DIZZINESS. 


Objective: 





18 11:55


OBJ:





GENERAL: ALERT AND ORIENTED X 3. IN NAD.





SKIN: WARM AND MOIST





CAR: S1S2





RESP: CTA BL





EXT: NO EDEMA, MILD TREMORS











18 11:57


 Vital Signs











Temperature  97.7 F   18 10:05


 


Pulse Rate  88   18 10:05


 


Respiratory Rate  18   18 10:05


 


Blood Pressure  151/82   18 10:05


 


O2 Sat by Pulse Oximetry (%)      








 Lab Results











WBC  5.0 K/mm3 (4.0-10.0)   18  07:00    


 


RBC  4.63 M/mm3 (4.00-5.60)   18  07:00    


 


Hgb  13.4 GM/dL (11.7-16.9)   18  07:00    


 


Hct  39.3 % (35.4-49)   18  07:00    


 


MCV  84.9 fl (80-96)   18  07:00    


 


MCHC  34.0 g/dl (32.0-35.9)   18  07:00    


 


RDW  14.2 % (11.9-15.9)   18  07:00    


 


Plt Count  206 K/MM3 (134-434)  D 18  07:00    


 


Sodium  142 mmol/L (136-145)   18  07:00    


 


Potassium  3.8 mmol/L (3.5-5.1)   18  07:00    


 


Chloride  104 mmol/L ()   18  07:00    


 


Carbon Dioxide  30 mmol/L (21-32)   18  07:00    


 


Anion Gap  8  (8-16)   18  07:00    


 


BUN  17 mg/dL (7-18)   18  07:00    


 


Creatinine  1.1 mg/dL (0.7-1.3)   18  07:00    


 


Random Glucose  101 mg/dL ()   18  07:00    


 


Calcium  9.3 mg/dL (8.5-10.1)   18  07:00    











Assessment: 





18 11:56


ETOH WITHDRAWAL SYMPTOMS


WITHDRAWAL SYNDROME


Plan: 





CONTINUE DETOX


ENCOURAGE FLUIDS


CONTINUE TO MONITOR CLINICALLY

## 2018-06-21 NOTE — CONSULT
[facility] Psychiatric Consult





- Data


Date of interview: 06/21/18


Admission source: [facility]


Identifying data: his is 47 years old male, single father , living with roommate

, part time wirking, with no psychiatric hospitalization history. Patient 

presents with ETOH withdrawal symptoms. Patient started drinking at age 15. 

Drinks up to 12 beers daily and up to 2 pinThis is Denies having srelapsed for 

the past 5-7 days. Non-compliant with medication and rehab. States last dose of 

Revia was over one week ago. Patient reports last drink earlier today around 

3pm. seekoing for detox.


Substance Abuse History: - Smoking Cessation.  Smoking history: Never smoked.  

Have you smoked in the past 12 months: No.  Aproximately how many cigarettes 

per day: 0.  If you are a former smoker, when did you quit?: more than year 

ago.  Cigars Per Day: 0.  Hx Chewing Tobacco Use: No.  - Substance & Tx. 

History.  Hx Alcohol Use: Yes.  Hx Substance Use: No.  Substance Use Type: 

Alcohol.  Hx Substance Use Treatment: Yes.  - Substances Abused.  ** Alcohol.  

Route: Oral.  Frequency: Daily.  Amount used: 12 beers, pint rum.  Age of first 

use: 15.  Date of Last Use: 06/20/18


Medical History: Denies significant medical issues


Psychiatric History: Patient reports history of depression and anxiety, reports 

no medicationsn taking prior to admission


Physical/Sexual Abuse/Trauma History: Denies


Additional Comment: Observation.  Detox Unit Care Protoicol





Mental Status Exam





- Mental Status Exam


Alert and Oriented to: Person


Cognitive Function: Fair


Patient Appearance: Unkempt


Mood: Withdrawn


Patient Behavior: Sedated


Speech Pattern: Delayed


Voice Loudness: Mildly Soft/Quiet


Thought Process: Circumstantial


Thought Disorder: Being Controlled


Hallucinations: Denies


Suicidal Ideation: Denies


Homicidal Ideation: Denies


Insight/Judgement: Fair


Sleep: Difficulty falling asleep


Appetite: Fair


Muscle strength/Tone: Rigidity


Gait/Station: Ataxic


Additional Comments: Observation.  Detox Unit Care Protoicol





Psychiatric Findings





- Problem List (Axis 1, 2,3)


(1) Alcohol dependence with uncomplicated withdrawal


Current Visit: Yes   Status: Acute   





(2) Insomnia


Current Visit: Yes   Status: Acute   


Qualifiers: 


   Insomnia type: primary   Qualified Code(s): F51.01 - Primary insomnia   





(3) Depression


Current Visit: Yes   Status: Chronic   


Qualifiers: 


   Depression Type: unspecified   Qualified Code(s): F32.9 - Major depressive 

disorder, single episode, unspecified   





(4) Status post transmetatarsal amputation of left foot


Current Visit: Yes   Status: Chronic   





(5) Alcohol dependence


Current Visit: No   Status: Acute   


Qualifiers: 


   Substance use status: uncomplicated   Qualified Code(s): F10.20 - Alcohol 

dependence, uncomplicated   





(6) Alcohol-induced anxiety disorder


Current Visit: No   Status: Acute   





(7) Alcohol-induced mood disorder


Current Visit: No   Status: Acute   





(8) Amputation, traumatic, toes


Current Visit: No   Status: Chronic   


Qualifiers: 


   Encounter type: subsequent encounter   Laterality: left   Qualified Code(s): 

S98.132D - Complete traumatic amputation of one left lesser toe, subsequent 

encounter   





(9) Cocaine abuse


Current Visit: No   Status: Chronic   





(10) Mild alprazolam abuse


Current Visit: No   Status: Chronic   





(11) Bipolar II disorder


Current Visit: No   Status: Suspected   





(12) Drug-induced mood disorder


Current Visit: No   Status: Suspected

## 2018-06-21 NOTE — EKG
Test Reason : 

Blood Pressure : ***/*** mmHG

Vent. Rate : 085 BPM     Atrial Rate : 085 BPM

   P-R Int : 146 ms          QRS Dur : 106 ms

    QT Int : 358 ms       P-R-T Axes : 068 077 049 degrees

   QTc Int : 426 ms

 

NORMAL SINUS RHYTHM

NORMAL ECG

WHEN COMPARED WITH ECG OF 17-APR-2018 21:54,

NO SIGNIFICANT CHANGE WAS FOUND

Confirmed by KEDAR GUTIERREZ MD ([id number]) on 6/21/2018 12:00:46 PM

 

Referred By:             Confirmed By:KEDAR GUTIERREZ MD

## 2018-06-22 RX ADMIN — CYCLOBENZAPRINE HYDROCHLORIDE SCH: 10 TABLET, FILM COATED ORAL at 23:01

## 2018-06-22 RX ADMIN — Medication SCH: at 23:02

## 2018-06-22 RX ADMIN — Medication SCH TAB: at 11:07

## 2018-06-22 NOTE — PN
S CIWA





- CIWA Score


Nausea/Vomitin-No Nausea/No Vomiting


Muscle Tremors: 4-Moderate,w/Arms Extend


Anxiety: 4-Mod. Anxious/Guarded


Agitation: 3


Paroxysmal Sweats: 1-Minimal Palms Moist


Orientation: 0-Oriented


Tacttile Disturbances: 0-None


Auditory Disturbances: 0-None


Visual Disturbances: 0-None


Headache: 0-None Present


CIWA-Ar Total Score: 12





[facility]HS Progress Note (SOAP)


Subjective: 





ANXIETY, SWEATS. 


Objective: 





18 09:49


 Vital Signs











  18





  06:00


 


Temperature 97.3 F L


 


Pulse Rate 102 H


 


Respiratory 18





Rate 


 


Blood Pressure 131/88








 Laboratory Tests











  18





  07:00 07:00 07:00


 


WBC  5.0  


 


RBC  4.63  


 


Hgb  13.4  


 


Hct  39.3  


 


MCV  84.9  


 


MCH  28.9  


 


MCHC  34.0  


 


RDW  14.2  


 


Plt Count  206  D  


 


MPV  8.0  


 


Sodium   142 


 


Potassium   3.8 


 


Chloride   104 


 


Carbon Dioxide   30 


 


Anion Gap   8 


 


BUN   17 


 


Creatinine   1.1 


 


Creat Clearance w eGFR   > 60 


 


Random Glucose   101 


 


Calcium   9.3 


 


Total Bilirubin   0.3 


 


AST   46 H D 


 


ALT   97 H 


 


Alkaline Phosphatase   73 


 


Total Protein   7.6 


 


Albumin   3.7 


 


Urine Color   


 


Urine Appearance   


 


Urine pH   


 


Ur Specific Gravity   


 


Urine Protein   


 


Urine Glucose (UA)   


 


Urine Ketones   


 


Urine Blood   


 


Urine Nitrite   


 


Urine Bilirubin   


 


Urine Urobilinogen   


 


Ur Leukocyte Esterase   


 


RPR Titer    Nonreactive














  18





  08:00


 


WBC 


 


RBC 


 


Hgb 


 


Hct 


 


MCV 


 


MCH 


 


MCHC 


 


RDW 


 


Plt Count 


 


MPV 


 


Sodium 


 


Potassium 


 


Chloride 


 


Carbon Dioxide 


 


Anion Gap 


 


BUN 


 


Creatinine 


 


Creat Clearance w eGFR 


 


Random Glucose 


 


Calcium 


 


Total Bilirubin 


 


AST 


 


ALT 


 


Alkaline Phosphatase 


 


Total Protein 


 


Albumin 


 


Urine Color  Ltyellow


 


Urine Appearance  Clear


 


Urine pH  5.0


 


Ur Specific Gravity  1.018


 


Urine Protein  Negative


 


Urine Glucose (UA)  Negative


 


Urine Ketones  Negative


 


Urine Blood  Negative


 


Urine Nitrite  Negative


 


Urine Bilirubin  Negative


 


Urine Urobilinogen  Negative


 


Ur Leukocyte Esterase  Negative


 


RPR Titer 











Assessment: 





18 09:49


WITHDRAWAL SX


Plan: 





CONTINUE DETOX

## 2018-06-23 RX ADMIN — Medication SCH TAB: at 10:45

## 2018-06-23 RX ADMIN — CYCLOBENZAPRINE HYDROCHLORIDE SCH MG: 10 TABLET, FILM COATED ORAL at 13:15

## 2018-06-23 RX ADMIN — CYCLOBENZAPRINE HYDROCHLORIDE SCH MG: 10 TABLET, FILM COATED ORAL at 05:22

## 2018-06-23 NOTE — PN
BHS Progress Note (SOAP)


Subjective: 





Anxiety, interrupted sleep, back pain


Objective: 





06/23/18 11:31


 Vital Signs - 8 hr











  06/23/18 06/23/18





  06:35 10:35


 


Temperature 97.7 F 97.7 F


 


Pulse Rate 76 96 H


 


Respiratory 18 18





Rate  


 


Blood Pressure 142/80 142/86








 Laboratory Last Values











WBC  5.0 K/mm3 (4.0-10.0)   06/21/18  07:00    


 


RBC  4.63 M/mm3 (4.00-5.60)   06/21/18  07:00    


 


Hgb  13.4 GM/dL (11.7-16.9)   06/21/18  07:00    


 


Hct  39.3 % (35.4-49)   06/21/18  07:00    


 


MCV  84.9 fl (80-96)   06/21/18  07:00    


 


MCH  28.9 pg (25.7-33.7)   06/21/18  07:00    


 


MCHC  34.0 g/dl (32.0-35.9)   06/21/18  07:00    


 


RDW  14.2 % (11.9-15.9)   06/21/18  07:00    


 


Plt Count  206 K/MM3 (134-434)  D 06/21/18  07:00    


 


MPV  8.0 fl (7.5-11.1)   06/21/18  07:00    


 


Sodium  142 mmol/L (136-145)   06/21/18  07:00    


 


Potassium  3.8 mmol/L (3.5-5.1)   06/21/18  07:00    


 


Chloride  104 mmol/L ()   06/21/18  07:00    


 


Carbon Dioxide  30 mmol/L (21-32)   06/21/18  07:00    


 


Anion Gap  8  (8-16)   06/21/18  07:00    


 


BUN  17 mg/dL (7-18)   06/21/18  07:00    


 


Creatinine  1.1 mg/dL (0.7-1.3)   06/21/18  07:00    


 


Creat Clearance w eGFR  > 60  (>60)   06/21/18  07:00    


 


Random Glucose  101 mg/dL ()   06/21/18  07:00    


 


Calcium  9.3 mg/dL (8.5-10.1)   06/21/18  07:00    


 


Total Bilirubin  0.3 mg/dL (0.2-1.0)   06/21/18  07:00    


 


AST  46 U/L (15-37)  H D 06/21/18  07:00    


 


ALT  97 U/L (12-78)  H  06/21/18  07:00    


 


Alkaline Phosphatase  73 U/L ()   06/21/18  07:00    


 


Total Protein  7.6 g/dl (6.4-8.2)   06/21/18  07:00    


 


Albumin  3.7 g/dl (3.4-5.0)   06/21/18  07:00    


 


Urine Color  Ltyellow   06/21/18  08:00    


 


Urine Appearance  Clear   06/21/18  08:00    


 


Urine pH  5.0  (5.0-8.0)   06/21/18  08:00    


 


Ur Specific Gravity  1.018  (1.001-1.035)   06/21/18  08:00    


 


Urine Protein  Negative  (NEGATIVE)   06/21/18  08:00    


 


Urine Glucose (UA)  Negative  (NEGATIVE)   06/21/18  08:00    


 


Urine Ketones  Negative  (NEGATIVE)   06/21/18  08:00    


 


Urine Blood  Negative  (NEGATIVE)   06/21/18  08:00    


 


Urine Nitrite  Negative  (NEGATIVE)   06/21/18  08:00    


 


Urine Bilirubin  Negative  (<2.0 mg/dL)   06/21/18  08:00    


 


Urine Urobilinogen  Negative mg/dL (0.2-1.0)   06/21/18  08:00    


 


Ur Leukocyte Esterase  Negative  (NEGATIVE)   06/21/18  08:00    


 


RPR Titer  Nonreactive  (NONREACTIVE)   06/21/18  07:00    








Labs noted


Assessment: 





06/23/18 11:35


Withdrawal sx


Plan: 





Continue detox

## 2018-06-24 VITALS — TEMPERATURE: 97.7 F | SYSTOLIC BLOOD PRESSURE: 138 MMHG | HEART RATE: 75 BPM | DIASTOLIC BLOOD PRESSURE: 90 MMHG

## 2018-06-24 RX ADMIN — CYCLOBENZAPRINE HYDROCHLORIDE SCH: 10 TABLET, FILM COATED ORAL at 00:11

## 2018-06-24 RX ADMIN — Medication SCH: at 00:11

## 2018-06-24 RX ADMIN — CYCLOBENZAPRINE HYDROCHLORIDE SCH MG: 10 TABLET, FILM COATED ORAL at 05:55

## 2018-06-24 NOTE — DS
[facility]HS Detox Discharge Summary


Admission Date: 


06/20/18





Discharge Date: 06/24/18





- History


Present History: Alcohol Dependence


Additional Comments: 





47 years old male admitted 6/20/18 for alcohol withdrawal sx


completed alcohol detox regimen tolerate well denies alcohol withdrawal sx


[facility] by the [facility]





- Physical Exam Results


Vital Signs: 


 Vital Signs











Temperature  97.7 F   06/24/18 06:58


 


Pulse Rate  75   06/24/18 06:58


 


Respiratory Rate  20   06/24/18 06:58


 


Blood Pressure  138/90   06/24/18 06:58


 


O2 Sat by Pulse Oximetry (%)      











Pertinent Admission Physical Exam Findings: 





alcohol withdrawal sx


 Vital Signs











Temperature  97.7 F   06/24/18 06:58


 


Pulse Rate  75   06/24/18 06:58


 


Respiratory Rate  20   06/24/18 06:58


 


Blood Pressure  138/90   06/24/18 06:58


 


O2 Sat by Pulse Oximetry (%)      








 Laboratory Last Values











WBC  5.0 K/mm3 (4.0-10.0)   06/21/18  07:00    


 


RBC  4.63 M/mm3 (4.00-5.60)   06/21/18  07:00    


 


Hgb  13.4 GM/dL (11.7-16.9)   06/21/18  07:00    


 


Hct  39.3 % (35.4-49)   06/21/18  07:00    


 


MCV  84.9 fl (80-96)   06/21/18  07:00    


 


MCH  28.9 pg (25.7-33.7)   06/21/18  07:00    


 


MCHC  34.0 g/dl (32.0-35.9)   06/21/18  07:00    


 


RDW  14.2 % (11.9-15.9)   06/21/18  07:00    


 


Plt Count  206 K/MM3 (134-434)  D 06/21/18  07:00    


 


MPV  8.0 fl (7.5-11.1)   06/21/18  07:00    


 


Sodium  142 mmol/L (136-145)   06/21/18  07:00    


 


Potassium  3.8 mmol/L (3.5-5.1)   06/21/18  07:00    


 


Chloride  104 mmol/L ()   06/21/18  07:00    


 


Carbon Dioxide  30 mmol/L (21-32)   06/21/18  07:00    


 


Anion Gap  8  (8-16)   06/21/18  07:00    


 


BUN  17 mg/dL (7-18)   06/21/18  07:00    


 


Creatinine  1.1 mg/dL (0.7-1.3)   06/21/18  07:00    


 


Creat Clearance w eGFR  > 60  (>60)   06/21/18  07:00    


 


Random Glucose  101 mg/dL ()   06/21/18  07:00    


 


Calcium  9.3 mg/dL (8.5-10.1)   06/21/18  07:00    


 


Total Bilirubin  0.3 mg/dL (0.2-1.0)   06/21/18  07:00    


 


AST  46 U/L (15-37)  H D 06/21/18  07:00    


 


ALT  97 U/L (12-78)  H  06/21/18  07:00    


 


Alkaline Phosphatase  73 U/L ()   06/21/18  07:00    


 


Total Protein  7.6 g/dl (6.4-8.2)   06/21/18  07:00    


 


Albumin  3.7 g/dl (3.4-5.0)   06/21/18  07:00    


 


Urine Color  Ltyellow   06/21/18  08:00    


 


Urine Appearance  Clear   06/21/18  08:00    


 


Urine pH  5.0  (5.0-8.0)   06/21/18  08:00    


 


Ur Specific Gravity  1.018  (1.001-1.035)   06/21/18  08:00    


 


Urine Protein  Negative  (NEGATIVE)   06/21/18  08:00    


 


Urine Glucose (UA)  Negative  (NEGATIVE)   06/21/18  08:00    


 


Urine Ketones  Negative  (NEGATIVE)   06/21/18  08:00    


 


Urine Blood  Negative  (NEGATIVE)   06/21/18  08:00    


 


Urine Nitrite  Negative  (NEGATIVE)   06/21/18  08:00    


 


Urine Bilirubin  Negative  (<2.0 mg/dL)   06/21/18  08:00    


 


Urine Urobilinogen  Negative mg/dL (0.2-1.0)   06/21/18  08:00    


 


Ur Leukocyte Esterase  Negative  (NEGATIVE)   06/21/18  08:00    


 


RPR Titer  Nonreactive  (NONREACTIVE)   06/21/18  07:00    








lab noted





- Treatment


Hospital Course: Detox Protocol Followed, Detoxed Safely, Responded well, 

Discharged Condition Good, Rehab Referral Accepted


Patient has Accepted a Rehab Referral to: [facility] [place] 





- Medication


Discharge Medications: 


Ambulatory Orders





NK [No Known Home Medication]  10/18/17 











- Diagnosis


(1) Alcohol dependence with uncomplicated withdrawal


Current Visit: Yes   Status: Acute   





(2) Amputation, traumatic, toes


Current Visit: No   Status: Chronic   


Qualifiers: 


   Encounter type: subsequent encounter   Laterality: left   Qualified Code(s): 

S98.132D - Complete traumatic amputation of one left lesser toe, subsequent 

encounter   





(3) Nicotine dependence


Current Visit: Yes   Status: Acute   


Qualifiers: 


   Nicotine product type: cigarettes   Substance use status: in withdrawal   

Qualified Code(s): F17.213 - Nicotine dependence, cigarettes, with withdrawal   





(4) PPD positive


Current Visit: No   Status: Resolved   





- AMA


Did Patient Leave Against Medical Advice: No

## 2018-07-27 ENCOUNTER — HOSPITAL ENCOUNTER (INPATIENT)
Dept: HOSPITAL 74 - YASAS | Age: 48
LOS: 3 days | Discharge: HOME | End: 2018-07-30
Attending: SURGERY | Admitting: SURGERY
Payer: COMMERCIAL

## 2018-07-27 DIAGNOSIS — R76.11: ICD-10-CM

## 2018-07-27 DIAGNOSIS — F14.10: ICD-10-CM

## 2018-07-27 DIAGNOSIS — F10.24: ICD-10-CM

## 2018-07-27 DIAGNOSIS — F10.230: Primary | ICD-10-CM

## 2018-07-27 DIAGNOSIS — G47.00: ICD-10-CM

## 2018-07-27 DIAGNOSIS — F51.05: ICD-10-CM

## 2018-07-27 PROCEDURE — HZ2ZZZZ DETOXIFICATION SERVICES FOR SUBSTANCE ABUSE TREATMENT: ICD-10-PCS | Performed by: SURGERY

## 2018-07-27 NOTE — HP
CIWA Score





- CIWA Score


Nausea/Vomiting: 3


Muscle Tremors: 1-None Visible, but Felt


Anxiety: 5


Agitation: 5


Paroxysmal Sweats: 3


Orientation: 2-Disoriented Date<2 days


Tacttile Disturbances: 1-Very Mild Itch/Numbness


Auditory Disturbances: 0-None


Visual Disturbances: 3-Moderate Sensitivity


Headache: 2-Mild


CIWA-Ar Total Score: 25





Admission ROS [facility]





- HPI


Chief Complaint: 





SEEKING DETOX FOR ETOH WITHDRAWAL SX'S


Allergies/Adverse Reactions: 


 Allergies











Allergy/AdvReac Type Severity Reaction Status Date / Time


 


No Known Allergies Allergy   Verified 18 22:30











History of Present Illness: 








47 Y.O. MALE WITH LONG HX/O ALCOHOLISM KNOWN TO THIS PROGRAM HERE FOR DETOX. 

CLIENT WAS REFERRED BY SELF. REPORTS PMHX DEPRESSION. REPORTS LONGEST CLEAN 

TIME 2 YEARS. DENIES LEGALS


Exam Limitations: Intoxication





- Ebola screening


Have you traveled outside of the country in the last 21 days: No (N)


Have you had contact with anyone from an Ebola affected area: No


Do you have a fever: No





- Review of Systems


Constitutional: Chills, Loss of Appetite, Night Sweats, Changes in sleep


EENT: reports: No Symptoms Reported


Respiratory: reports: No Symptoms reported


Cardiac: reports: No Symptoms Reported


GI: reports: Nausea


: reports: No Symptoms Reported


Musculoskeletal: reports: No Symptoms Reported


Integumentary: reports: Flushing


Neuro: reports: No Symptoms reported


Endocrine: reports: No Symptoms Reported


Hematology: reports: No Symptoms Reported


Psychiatric: reports: Anxious, Depressed


Other Systems: Reviewed and Negative





Patient History





- Patient Medical History


Hx Anemia: No


Hx Asthma: No


Hx Chronic Obstructive Pulmonary Disease (COPD): No


Hx Cancer: No


Hx Cardiac Disorders: No


Hx Congestive Heart Failure: No


Hx Hypertension: No


Hx Hypercholesterolemia: No


Hx Pacemaker: No


HX Cerebrovascular Accident: No


Hx Seizures: No


Hx Dementia: No


Hx Diabetes: No


Hx Gastrointestinal Disorders: No


Hx Liver Disease: No


Hx Genitourinary Disorders: No


Hx Sexually Transmitted Disorders: No


Hx Renal Disease (ESRD): No


Hx Thyroid Disease: No


Hx Human Immunodeficiency Virus (HIV): No


Hx Hepatitis C: No


Hx Depression: Yes (NEVER TX'ED)


Hx Suicide Attempt: No


Hx Bipolar Disorder: No


Hx Schizophrenia: No


Other Medical History: ANXIETY





- Patient Surgical History


Past Surgical History: Yes


Hx Neurologic Surgery: No


Hx Cataract Extraction: No


Hx Cardiac Surgery: No


Hx Lung Surgery: No


Hx Breast Surgery: No


Hx Breast Biopsy: No


Hx Abdominal Surgery: No


Hx Appendectomy: No


Hx Cholecystectomy: No


Hx Genitourinary Surgery: No


Hx  Section: No


Hx Orthopedic Surgery: Yes (traumatic transmetatarsal amputation of left foot 

in  in [facility])


Hx Hysterectomy: No


Other Surgical History: h/o nasal and rt rib fx's in past 


Anesthesia Reaction: No





- PPD History


Previous Implant?: Yes


Documented Results: Positive w/proof


Implanted On Prior [facility] Admission?: Yes


Date: 17 (NEG CXR 2018)


Results: positive


PPD to be Administered?: No





- Smoking Cessation


Smoking history: Never smoked


Have you smoked in the past 12 months: No


Aproximately how many cigarettes per day: 0


If you are a former smoker, when did you quit?: more than year ago


Cigars Per Day: 0


Hx Chewing Tobacco Use: No


Initiated information on smoking cessation: No





- Substance & Tx. History


Hx Alcohol Use: Yes


Hx Substance Use: No


Substance Use Type: Alcohol


Hx Substance Use Treatment: Yes ([facility])





- Substances Abused


  ** BEER/VODKA


Route: Oral


Frequency: 3-6 times per week


Amount used: 3-6PACK/ 1 PINT


Age of first use: 17


Date of Last Use: 18





Family Disease History





- Family Disease History


Family Disease History: Respiratory: Father (living, ALCOHOL in past), Other: 

Mother (living, RA), Brother (two - living - healthy), Sister (one - living - 

healthy), Daughter (one - age 18 - healthy)





Admission Physical Exam [facility] Physical


General Appearance: Yes: Appropriately Dressed, Mild Distress, Intoxicated


HEENTM: Yes: EOMI, Normal ENT Inspection, Normocephalic, Normal Voice, SHANNON, 

Pharynx Normal


Respiratory: Yes: Within Normal Limits, Chest Non-Tender, Lungs Clear, No 

Respiratory Distress, No Accessory Muscle Use


Neck: Yes: No masses,lesions,Nodules, Supple, Trachea in good position


Breast: Yes: Breast Exam Deferred


Cardiology: Yes: Regular Rhythm, Regular Rate, S1, S2


Abdominal: Yes: Normal Bowel Sounds, Non Tender, Soft, Protuberent


Genitourinary: Yes: Within Normal Limits


Back: Yes: Normal Inspection


Musculoskeletal: Yes: full range of Motion, Gait Steady


Extremities: Yes: Normal Inspection, Normal Range of Motion, Non-Tender


Neurological: Yes: Alert, Motor Strength 5/5, Disoriented (TO DATE), Depressed 

Affect


Integumentary: Yes: Warm, Other (FLUSHED)


Lymphatic: Yes: Within Normal Limits





- Diagnostic


(1) History of positive PPD


Current Visit: Yes   Status: Chronic   





(2) Cocaine abuse, uncomplicated


Current Visit: Yes   Status: Chronic   





(3) Alcohol dependence with uncomplicated withdrawal


Current Visit: Yes   Status: Acute   





(4) Alcohol-induced mood disorder


Current Visit: Yes   Status: Suspected   





(5) Insomnia


Current Visit: Yes   Status: Chronic   


Qualifiers: 


   Insomnia type: primary   Qualified Code(s): F51.01 - Primary insomnia   





Cleared for Admission [facility]





- Detox or Rehab


[facility] Level of Care: Medically Managed


Detox Regimen/Protocol: Librium


Claeared for Rehab Admission: No





S Breath Alcohol Content


Breath Alcohol Content: 0

## 2018-07-28 LAB
ALBUMIN SERPL-MCNC: 4.4 G/DL (ref 3.4–5)
ALP SERPL-CCNC: 60 U/L (ref 45–117)
ALT SERPL-CCNC: 203 U/L (ref 12–78)
ANION GAP SERPL CALC-SCNC: 9 MMOL/L (ref 8–16)
APPEARANCE UR: (no result)
AST SERPL-CCNC: 109 U/L (ref 15–37)
BILIRUB SERPL-MCNC: 0.7 MG/DL (ref 0.2–1)
BILIRUB UR STRIP.AUTO-MCNC: NEGATIVE MG/DL
BUN SERPL-MCNC: 19 MG/DL (ref 7–18)
CALCIUM SERPL-MCNC: 9.4 MG/DL (ref 8.5–10.1)
CHLORIDE SERPL-SCNC: 103 MMOL/L (ref 98–107)
CO2 SERPL-SCNC: 25 MMOL/L (ref 21–32)
COLOR UR: YELLOW
CREAT SERPL-MCNC: 1.1 MG/DL (ref 0.7–1.3)
DEPRECATED RDW RBC AUTO: 15.9 % (ref 11.9–15.9)
GLUCOSE SERPL-MCNC: 89 MG/DL (ref 74–106)
HCT VFR BLD CALC: 41 % (ref 35.4–49)
HGB BLD-MCNC: 14.1 GM/DL (ref 11.7–16.9)
KETONES UR QL STRIP: NEGATIVE
LEUKOCYTE ESTERASE UR QL STRIP.AUTO: NEGATIVE
MCH RBC QN AUTO: 29.3 PG (ref 25.7–33.7)
MCHC RBC AUTO-ENTMCNC: 34.3 G/DL (ref 32–35.9)
MCV RBC: 85.5 FL (ref 80–96)
NITRITE UR QL STRIP: NEGATIVE
PH UR: 5 [PH] (ref 5–8)
PLATELET # BLD AUTO: 236 K/MM3 (ref 134–434)
PMV BLD: 7.9 FL (ref 7.5–11.1)
POTASSIUM SERPLBLD-SCNC: 4.6 MMOL/L (ref 3.5–5.1)
PROT SERPL-MCNC: 8.7 G/DL (ref 6.4–8.2)
PROT UR QL STRIP: NEGATIVE
PROT UR QL STRIP: NEGATIVE
RBC # BLD AUTO: 4.79 M/MM3 (ref 4–5.6)
SODIUM SERPL-SCNC: 137 MMOL/L (ref 136–145)
SP GR UR: 1.02 (ref 1–1.03)
UROBILINOGEN UR STRIP-MCNC: NEGATIVE MG/DL (ref 0.2–1)
WBC # BLD AUTO: 3.9 K/MM3 (ref 4–10)

## 2018-07-28 RX ADMIN — Medication PRN MG: at 22:31

## 2018-07-28 RX ADMIN — Medication SCH MG: at 22:31

## 2018-07-28 RX ADMIN — Medication SCH TAB: at 10:30

## 2018-07-28 NOTE — PN
[facility] CIWA





- CIWA Score


Nausea/Vomitin-No Nausea/No Vomiting


Muscle Tremors: 3


Anxiety: 4-Mod. Anxious/Guarded


Agitation: 3


Paroxysmal Sweats: 3


Orientation: 0-Oriented


Tacttile Disturbances: 0-None


Auditory Disturbances: 2-Mild Harshness/Frighten


Visual Disturbances: 2-Mild Sensitivity


Headache: 0-None Present


CIWA-Ar Total Score: 17





BHS Progress Note (SOAP)


Subjective: 





Tremors, Anxious, Interrupted Sleep, Sweating.


Objective: 


PATIENT A & O X 3, OBSERVED AMBULATING ON UNIT. NO ACUTE DISTRESS.





18 18:37


 Vital Signs











Temperature  98.4 F   18 18:28


 


Pulse Rate  103 H  18 18:28


 


Respiratory Rate  18   18 18:28


 


Blood Pressure  143/87   18 18:28


 


O2 Sat by Pulse Oximetry (%)      








 Laboratory Tests











  18





  08:00 08:00 08:00


 


WBC  3.9 L  


 


RBC  4.79  


 


Hgb  14.1  


 


Hct  41.0  


 


MCV  85.5  


 


MCH  29.3  


 


MCHC  34.3  


 


RDW  15.9  D  


 


Plt Count  236  


 


MPV  7.9  


 


Sodium   137 


 


Potassium   4.6  D 


 


Chloride   103 


 


Carbon Dioxide   25 


 


Anion Gap   9 


 


BUN   19 H 


 


Creatinine   1.1 


 


Creat Clearance w eGFR   > 60 


 


Random Glucose   89 


 


Calcium   9.4 


 


Total Bilirubin   0.7 


 


AST   109 H D 


 


ALT   203 H D 


 


Alkaline Phosphatase   60 


 


Total Protein   8.7 H 


 


Albumin   4.4 


 


Urine Color   


 


Urine Appearance   


 


Urine pH   


 


Ur Specific Gravity   


 


Urine Protein   


 


Urine Glucose (UA)   


 


Urine Ketones   


 


Urine Blood   


 


Urine Nitrite   


 


Urine Bilirubin   


 


Urine Urobilinogen   


 


Ur Leukocyte Esterase   


 


RPR Titer    Nonreactive














  18





  08:20


 


WBC 


 


RBC 


 


Hgb 


 


Hct 


 


MCV 


 


MCH 


 


MCHC 


 


RDW 


 


Plt Count 


 


MPV 


 


Sodium 


 


Potassium 


 


Chloride 


 


Carbon Dioxide 


 


Anion Gap 


 


BUN 


 


Creatinine 


 


Creat Clearance w eGFR 


 


Random Glucose 


 


Calcium 


 


Total Bilirubin 


 


AST 


 


ALT 


 


Alkaline Phosphatase 


 


Total Protein 


 


Albumin 


 


Urine Color  Yellow


 


Urine Appearance  Cloudy


 


Urine pH  5.0


 


Ur Specific Gravity  1.020


 


Urine Protein  Negative


 


Urine Glucose (UA)  Negative


 


Urine Ketones  Negative


 


Urine Blood  Negative


 


Urine Nitrite  Negative


 


Urine Bilirubin  Negative


 


Urine Urobilinogen  Negative


 


Ur Leukocyte Esterase  Negative


 


RPR Titer 








LABS NOTED.


Assessment: 





18 18:37


WITHDRAWAL SYMPTOMS.


Plan: 





CONTINUE DETOX.


INCREASE DAILY PO FLUID INTAKE.


REPEAT AST, ALT ON 2018 FOR ELEVATED ADMISSION LEVELS.

## 2018-07-28 NOTE — CONSULT
[facility] Psychiatric Consult





- Data


Date of interview: 07/28/18


Admission source: [facility]


Identifying data: This is one of several admissions to [facility] for this 47 y/

o [place]-born male seeking detox treatment,[address],for alcohol 

dependence.Patient is ,a father of one,domiciled and reportedly part-

time employed.


Substance Abuse History: Discussed in my interview.Patient confirms a long 

standing history + current abuse of alcohol.Details in this [facility] report.Smoking 

history: Never smoked.  Have you smoked in the past 12 months: No.  

Aproximately how many cigarettes per day: 0.  If you are a former smoker, when 

did you quit?: more than year ago.  Cigars Per Day: 0.  Hx Chewing Tobacco Use: 

No.  Initiated information on smoking cessation: No.  - Substance & Tx. 

History.  Hx Alcohol Use: Yes.  Hx Substance Use: No.  Substance Use Type: 

Alcohol.  Hx Substance Use Treatment: Yes ([facility]).  - Substances Abused.  ** BEER

/VODKA.  Route: Oral.  Frequency: 3-6 times per week.  Amount used: 3-6PACK/ 1 

PINT.  Age of first use: 17.  Date of Last Use: 07/27/18


Medical History: Patient denies medical problems.Noted history of orthopedic 

surgery (traumatic transmetatarsal amputation of left foot in 1991).


Psychiatric History: No reported history of psychiatric 

hospitalizations.According to the patient, psychiatric profile is marked only 

by intermittent CPEP visits for alcohol intoxication + discharge after a period 

of observation.Mr Rios does admit to brief contacts with OPD care providers in 

the past (was diagnosed, at various programs, with Bipolar Disorder and 

prescribed mood stabilizers and atypical agents).History of chronic non-

adherence to outpatient care.Patient declares that he has been off psychotropic 

medications for " several months if not years." Denies history of suicide 

attempts.


Physical/Sexual Abuse/Trauma History: Patient denies.


Additional Comment: Toxicology not available on admission.





Mental Status Exam





- Mental Status Exam


Alert and Oriented to: Time, Place, Person


Cognitive Function: Good


Patient Appearance: Well Groomed


Mood: Hopeful, Euthymic


Affect: Appropriate, Normal Range


Patient Behavior: Fatigued, Appropriate, Cooperative


Speech Pattern: Clear


Voice Loudness: Normal


Thought Process: Intact, Goal Oriented


Thought Disorder: Not Present


Hallucinations: Denies


Suicidal Ideation: Denies


Homicidal Ideation: Denies


Insight/Judgement: Poor


Sleep: Well


Appetite: Good


Muscle strength/Tone: Normal


Gait/Station: Normal





Psychiatric Findings





- Problem List (Axis 1, 2,3)


(1) Alcohol dependence with uncomplicated withdrawal


Current Visit: Yes   Status: Acute   





- Initial Treatment Plan


Initial Treatment Plan: Psychoeducation.Detoxification.Observation.

## 2018-07-29 RX ADMIN — Medication PRN MG: at 22:20

## 2018-07-29 RX ADMIN — Medication SCH TAB: at 10:32

## 2018-07-29 RX ADMIN — Medication SCH MG: at 22:19

## 2018-07-29 NOTE — PN
S CIWA





- CIWA Score


Nausea/Vomitin-No Nausea/No Vomiting


Muscle Tremors: None


Anxiety: 0-No Anxiety, at Ease


Agitation: 0-Normal Activity


Paroxysmal Sweats: No Perspiration


Orientation: 0-Oriented


Tacttile Disturbances: 0-None


Auditory Disturbances: 0-None


Visual Disturbances: 0-None


Headache: 0-None Present


CIWA-Ar Total Score: 0





[facility] Progress Note (SOAP)


Subjective: 





pt wants to leave tomorrow morning- wants a rapid taper schedule


Objective: 





18 16:44


 Vital Signs - 24 hr











  18





  18:28 22:57 06:37


 


Temperature 98.4 F 98.4 F 97.8 F


 


Pulse Rate 103 H 87 70


 


Respiratory 18 18 18





Rate   


 


Blood Pressure 143/87 144/96 114/68














  18





  09:22 13:39


 


Temperature 97.0 F L 98.6 F


 


Pulse Rate 85 98 H


 


Respiratory 18 20





Rate  


 


Blood Pressure 141/89 138/92








 Laboratory Tests











  18





  08:00 08:00 08:00


 


WBC  3.9 L  


 


RBC  4.79  


 


Hgb  14.1  


 


Hct  41.0  


 


MCV  85.5  


 


MCH  29.3  


 


MCHC  34.3  


 


RDW  15.9  D  


 


Plt Count  236  


 


MPV  7.9  


 


Sodium   137 


 


Potassium   4.6  D 


 


Chloride   103 


 


Carbon Dioxide   25 


 


Anion Gap   9 


 


BUN   19 H 


 


Creatinine   1.1 


 


Creat Clearance w eGFR   > 60 


 


Random Glucose   89 


 


Calcium   9.4 


 


Total Bilirubin   0.7 


 


AST   109 H D 


 


ALT   203 H D 


 


Alkaline Phosphatase   60 


 


Total Protein   8.7 H 


 


Albumin   4.4 


 


Urine Color   


 


Urine Appearance   


 


Urine pH   


 


Ur Specific Gravity   


 


Urine Protein   


 


Urine Glucose (UA)   


 


Urine Ketones   


 


Urine Blood   


 


Urine Nitrite   


 


Urine Bilirubin   


 


Urine Urobilinogen   


 


Ur Leukocyte Esterase   


 


RPR Titer    Nonreactive














  18





  08:20


 


WBC 


 


RBC 


 


Hgb 


 


Hct 


 


MCV 


 


MCH 


 


MCHC 


 


RDW 


 


Plt Count 


 


MPV 


 


Sodium 


 


Potassium 


 


Chloride 


 


Carbon Dioxide 


 


Anion Gap 


 


BUN 


 


Creatinine 


 


Creat Clearance w eGFR 


 


Random Glucose 


 


Calcium 


 


Total Bilirubin 


 


AST 


 


ALT 


 


Alkaline Phosphatase 


 


Total Protein 


 


Albumin 


 


Urine Color  Yellow


 


Urine Appearance  Cloudy


 


Urine pH  5.0


 


Ur Specific Gravity  1.020


 


Urine Protein  Negative


 


Urine Glucose (UA)  Negative


 


Urine Ketones  Negative


 


Urine Blood  Negative


 


Urine Nitrite  Negative


 


Urine Bilirubin  Negative


 


Urine Urobilinogen  Negative


 


Ur Leukocyte Esterase  Negative


 


RPR Titer 








labs and VS nl


Assessment: 





18 16:44


pt on alcohol detox protocol- needs to leave tomorrow- says doing OK


Plan: 





changed detox protocol: 25 mg this alicja, 15 mg tonight and 10mg in AM before d.c

## 2018-07-30 VITALS — DIASTOLIC BLOOD PRESSURE: 75 MMHG | SYSTOLIC BLOOD PRESSURE: 124 MMHG | HEART RATE: 78 BPM | TEMPERATURE: 98 F

## 2018-07-30 RX ADMIN — LOPERAMIDE HYDROCHLORIDE PRN MG: 2 CAPSULE ORAL at 05:49

## 2018-07-30 RX ADMIN — LOPERAMIDE HYDROCHLORIDE PRN MG: 2 CAPSULE ORAL at 00:25

## 2018-07-30 NOTE — EKG
Test Reason : 

Blood Pressure : ***/*** mmHG

Vent. Rate : 096 BPM     Atrial Rate : 096 BPM

   P-R Int : 134 ms          QRS Dur : 088 ms

    QT Int : 352 ms       P-R-T Axes : [phone] degrees

   QTc Int : 444 ms

 

NORMAL SINUS RHYTHM

RIGHTWARD AXIS

BORDERLINE ECG

WHEN COMPARED WITH ECG OF 20-JUN-2018 19:17,

NO SIGNIFICANT CHANGE WAS FOUND

Confirmed by CARLOS MAXWELL MD ([id number]) on 7/30/2018 11:08:20 AM

 

Referred By:             Confirmed By:CARLOS MAXWELL MD

## 2018-09-08 ENCOUNTER — HOSPITAL ENCOUNTER (INPATIENT)
Dept: HOSPITAL 74 - YASAS | Age: 48
LOS: 4 days | Discharge: HOME | End: 2018-09-12
Attending: INTERNAL MEDICINE | Admitting: INTERNAL MEDICINE
Payer: COMMERCIAL

## 2018-09-08 VITALS — BODY MASS INDEX: 28.8 KG/M2

## 2018-09-08 DIAGNOSIS — F10.230: Primary | ICD-10-CM

## 2018-09-08 DIAGNOSIS — F19.282: ICD-10-CM

## 2018-09-08 DIAGNOSIS — Z89.432: ICD-10-CM

## 2018-09-08 DIAGNOSIS — F19.280: ICD-10-CM

## 2018-09-08 DIAGNOSIS — F32.9: ICD-10-CM

## 2018-09-08 DIAGNOSIS — F19.24: ICD-10-CM

## 2018-09-08 DIAGNOSIS — F14.10: ICD-10-CM

## 2018-09-08 DIAGNOSIS — E78.00: ICD-10-CM

## 2018-09-08 DIAGNOSIS — R03.0: ICD-10-CM

## 2018-09-08 DIAGNOSIS — R76.11: ICD-10-CM

## 2018-09-08 DIAGNOSIS — F31.9: ICD-10-CM

## 2018-09-08 DIAGNOSIS — Z87.81: ICD-10-CM

## 2018-09-08 PROCEDURE — HZ2ZZZZ DETOXIFICATION SERVICES FOR SUBSTANCE ABUSE TREATMENT: ICD-10-PCS | Performed by: INTERNAL MEDICINE

## 2018-09-08 RX ADMIN — Medication SCH MG: at 22:09

## 2018-09-08 NOTE — HP
CIWA Score





- CIWA Score


Nausea/Vomiting: 3


Muscle Tremors: 3


Anxiety: 3


Agitation: 3


Paroxysmal Sweats: 3


Orientation: 0-Oriented


Tacttile Disturbances: 0-None


Auditory Disturbances: 0-None


Visual Disturbances: 0-None


Headache: 0-None Present


CIWA-Ar Total Score: 15





Admission ROS S





- HPI


Chief Complaint: 





"I am here because I have been drinking too much for about 2wks straight"


Allergies/Adverse Reactions: 


 Allergies











Allergy/AdvReac Type Severity Reaction Status Date / Time


 


No Known Allergies Allergy   Verified 18 22:30











History of Present Illness: 





Pt is a 48 y/o male with a long hx of alcohol addiction. Pt is well known to 

this program, last here in July.


States he has been drinking for 2 wks non stop and needs help to stop.  LAst 

drink was "right before I came in"


Smokes marijuana. Utox positive for Benzo's which pt states is because i take "

a medication" to help me stop drinking but it doesn't help, last taken 3 days 

ago


States my pressure is high when I am withdrawing from alcohol but is normal 

when I am good. Denies BP meds.


Denies previous or current SI. Denies any medical or psych hx. denies home meds





Exam Limitations: Intoxication





- Ebola screening


Have you traveled outside of the country in the last 21 days: No (N)


Have you had contact with anyone from an Ebola affected area: No


Have you been sick,other than usual withdrawal symptoms: No


Do you have a fever: No





- Review of Systems


Constitutional: No Symptoms Reported


EENT: reports: No Symptoms Reported


Respiratory: reports: No Symptoms reported


Cardiac: reports: No Symptoms Reported


GI: reports: No Symptoms Reported


: reports: No Symptoms Reported


Musculoskeletal: reports: No Symptoms Reported, Other (Missing toes s/p 

amputation of L foot toes)


Integumentary: reports: No Symptoms Reported


Neuro: reports: No Symptoms reported


Endocrine: reports: No Symptoms Reported


Hematology: reports: No Symptoms Reported


Psychiatric: reports: Judgement Intact, Orientated x3


Other Systems: Reviewed and Negative





Patient History





- Patient Medical History


Hx Anemia: No


Hx Asthma: No


Hx Chronic Obstructive Pulmonary Disease (COPD): No


Hx Cancer: No


Hx Cardiac Disorders: No


Hx Congestive Heart Failure: No


Hx Hypertension: Yes (When withdrawing, not on meds)


Hx Hypercholesterolemia: Yes (Not on meds)


Hx Pacemaker: No


HX Cerebrovascular Accident: No


Hx Seizures: No


Hx Dementia: No


Hx Diabetes: No


Hx Gastrointestinal Disorders: No


Hx Liver Disease: No


Hx Genitourinary Disorders: No


Hx Sexually Transmitted Disorders: No


Hx Renal Disease (ESRD): No


Hx Thyroid Disease: No


Hx Human Immunodeficiency Virus (HIV): No


Hx Hepatitis C: No


Hx Depression: Yes (NEVER TX'ED)


Hx Suicide Attempt: No


Hx Bipolar Disorder: No


Hx Schizophrenia: No





- Patient Surgical History


Past Surgical History: Yes


Hx Neurologic Surgery: No


Hx Cataract Extraction: No


Hx Cardiac Surgery: No


Hx Lung Surgery: No


Hx Breast Surgery: No


Hx Breast Biopsy: No


Hx Abdominal Surgery: No


Hx Appendectomy: No


Hx Cholecystectomy: No


Hx Genitourinary Surgery: No


Hx  Section: No


Hx Orthopedic Surgery: Yes (traumatic transmetatarsal amputation of left foot 

in  in [facility])


Hx Hysterectomy: No


Other Surgical History: h/o nasal and rt rib fx's in past 


Anesthesia Reaction: No





- PPD History


Previous Implant?: Yes


Documented Results: Positive w/o proof


Date: 18


Results: positive


PPD to be Administered?: No





- Reproductive History


Patient is a Female of Child Bearing Age (11 -55 yrs old): No





- Smoking Cessation


Smoking history: Never smoked


Have you smoked in the past 12 months: No


Aproximately how many cigarettes per day: 0


If you are a former smoker, when did you quit?: more than year ago


Cigars Per Day: 0


Hx Chewing Tobacco Use: No





- Substance & Tx. History


Hx Alcohol Use: Yes


Hx Substance Use: Yes


Substance Use Type: Alcohol, Marijuana


Hx Substance Use Treatment: Yes ([facility] - 2018)





- Substances Abused


  ** Alcohol


Route: Oral


Frequency: Daily


Amount used: 4 ( 6 pack of 16 oz) beer, 2 pints of vodka


Age of first use: 14


Date of Last Use: 18





  ** Marijuana/Hashish


Route: Smoking


Frequency: 1-3 times last 30 days


Amount used:  2 wraps


Age of first use: 19


Date of Last Use: 18





Family Disease History





- Family Disease History


Family Disease History: Respiratory: Father (living, ALCOHOL), Other: Mother (

living, RA), Brother (two - living - healthy), Sister (one - living - healthy), 

Daughter (one - age 18 - healthy)





Admission Physical Exam [facility]





- Vital Signs


Vital Signs: 


 Vital Signs - 24 hr











  18





  14:36


 


Temperature 97.6 F


 


Pulse Rate 101 H


 


Respiratory 20





Rate 


 


Blood Pressure 154/104














- Physical


General Appearance: Yes: Within Normal Limits, Nourished, Appropriately Dressed

, Intoxicated


HEENTM: Yes: Within Normal Limits, EOMI, Hearing grossly Normal, Normal ENT 

Inspection, Normocephalic, Normal Voice, SHANNON, Pharynx Normal


Respiratory: Yes: Within Normal Limits, Chest Non-Tender, Lungs Clear, Normal 

Breath Sounds, No Respiratory Distress, No Accessory Muscle Use


Neck: Yes: Within Normal Limits, No masses,lesions,Nodules, Trachea in good 

position


Breast: Yes: Breast Exam Deferred


Cardiology: Yes: Within Normal Limits, Regular Rhythm, Tachycardia (denies any 

discomfort)


Abdominal: Yes: Within Normal Limits, Normal Bowel Sounds, Non Tender, Flat, 

Soft


Genitourinary: Yes: Within Normal Limits


Back: Yes: Within Normal Limits, Normal Inspection


Musculoskeletal: Yes: Within Normal Limits, full range of Motion, Gait Steady, 

Pelvis Stable


Extremities: Yes: Within Normal Limits, Normal Capillary Refill, Normal 

Inspection, Normal Range of Motion, Non-Tender, Other (Missing all 5 L foot 

toes from previous trauma)


Neurological: Yes: Within Normal Limits, Fully Oriented, Alert, Motor Strength 5

/5, Normal Response


Integumentary: Yes: Within Normal Limits, Normal Color, Dry, Warm


Lymphatic: Yes: Within Normal Limits





- Diagnostic


(1) Alcohol dependence with uncomplicated withdrawal


Current Visit: Yes   Status: Acute   





(2) depression


Current Visit: Yes   Status: Suspected   





(3) Drug-induced mood disorder


Current Visit: Yes   Status: Suspected   





(4) Status post transmetatarsal amputation of left foot


Current Visit: Yes   Status: Chronic   





(5) Alcohol-induced mood disorder


Current Visit: No   Status: Suspected   





(6) History of positive PPD


Current Visit: Yes   Status: Chronic   





Cleared for Admission [facility]





- Detox or Rehab


[facility] Level of Care: Medically Managed


Detox Regimen/Protocol: Librium





[facility] Breath Alcohol Content


Breath Alcohol Content: 0.118





Urine Drug Screen





- Results


Drug Screen Negative: No


Urine Drug Screen Results: BZO-Benzodiazepines

## 2018-09-09 LAB
ALBUMIN SERPL-MCNC: 3.9 G/DL (ref 3.4–5)
ALP SERPL-CCNC: 71 U/L (ref 45–117)
ALT SERPL-CCNC: 191 U/L (ref 12–78)
ANION GAP SERPL CALC-SCNC: 10 MMOL/L (ref 8–16)
APPEARANCE UR: (no result)
AST SERPL-CCNC: 101 U/L (ref 15–37)
BILIRUB SERPL-MCNC: 0.5 MG/DL (ref 0.2–1)
BILIRUB UR STRIP.AUTO-MCNC: NEGATIVE MG/DL
BUN SERPL-MCNC: 14 MG/DL (ref 7–18)
CALCIUM SERPL-MCNC: 9.3 MG/DL (ref 8.5–10.1)
CHLORIDE SERPL-SCNC: 105 MMOL/L (ref 98–107)
CO2 SERPL-SCNC: 26 MMOL/L (ref 21–32)
COLOR UR: YELLOW
CREAT SERPL-MCNC: 1 MG/DL (ref 0.7–1.3)
DEPRECATED RDW RBC AUTO: 15.4 % (ref 11.9–15.9)
GLUCOSE SERPL-MCNC: 97 MG/DL (ref 74–106)
HCT VFR BLD CALC: 39.8 % (ref 35.4–49)
HGB BLD-MCNC: 13.3 GM/DL (ref 11.7–16.9)
KETONES UR QL STRIP: NEGATIVE
LEUKOCYTE ESTERASE UR QL STRIP.AUTO: NEGATIVE
MCH RBC QN AUTO: 28.6 PG (ref 25.7–33.7)
MCHC RBC AUTO-ENTMCNC: 33.4 G/DL (ref 32–35.9)
MCV RBC: 85.6 FL (ref 80–96)
NITRITE UR QL STRIP: NEGATIVE
PH UR: 5 [PH] (ref 5–8)
PLATELET # BLD AUTO: 221 K/MM3 (ref 134–434)
PMV BLD: 8 FL (ref 7.5–11.1)
POTASSIUM SERPLBLD-SCNC: 4.1 MMOL/L (ref 3.5–5.1)
PROT SERPL-MCNC: 8.3 G/DL (ref 6.4–8.2)
PROT UR QL STRIP: NEGATIVE
PROT UR QL STRIP: NEGATIVE
RBC # BLD AUTO: 4.64 M/MM3 (ref 4–5.6)
SODIUM SERPL-SCNC: 141 MMOL/L (ref 136–145)
SP GR UR: 1.02 (ref 1–1.03)
UROBILINOGEN UR STRIP-MCNC: NEGATIVE MG/DL (ref 0.2–1)
WBC # BLD AUTO: 3.8 K/MM3 (ref 4–10)

## 2018-09-09 RX ADMIN — Medication SCH MG: at 22:15

## 2018-09-09 RX ADMIN — LOPERAMIDE HYDROCHLORIDE PRN MG: 2 CAPSULE ORAL at 13:05

## 2018-09-09 RX ADMIN — HYDROXYZINE PAMOATE PRN MG: 50 CAPSULE ORAL at 22:15

## 2018-09-09 RX ADMIN — Medication SCH TAB: at 10:29

## 2018-09-09 RX ADMIN — IBUPROFEN PRN MG: 400 TABLET, FILM COATED ORAL at 10:31

## 2018-09-09 NOTE — PN
[facility] CIWA





- CIWA Score


Nausea/Vomitin


Muscle Tremors: 3


Anxiety: 3


Agitation: 3


Paroxysmal Sweats: 3


Orientation: 0-Oriented


Tacttile Disturbances: 1-Very Mild Itch/Numbness


Auditory Disturbances: 0-None


Visual Disturbances: 0-None


Headache: 1-Very Mild


CIWA-Ar Total Score: 16





BHS Progress Note (SOAP)


Subjective: 





Anxious, stomach ache, headache, interrupted sleep


Objective: 





18 15:31


 Last Vital Signs











Temp Pulse Resp BP Pulse Ox


 


 98.0 F   82   18   155/98    


 


 18 13:07  18 14:00  18 14:00  18 13:07   








 Laboratory Tests


As per chart b/p elevated only during withdrawal (no h/o htn)








  18





  08:00 08:00 08:00


 


WBC  3.8 L  


 


RBC  4.64  


 


Hgb  13.3  


 


Hct  39.8  


 


MCV  85.6  


 


MCH  28.6  


 


MCHC  33.4  


 


RDW  15.4  


 


Plt Count  221  


 


MPV  8.0  


 


Sodium   141 


 


Potassium   4.1 


 


Chloride   105 


 


Carbon Dioxide   26 


 


Anion Gap   10 


 


BUN   14 


 


Creatinine   1.0 


 


Creat Clearance w eGFR   > 60 


 


Random Glucose   97 


 


Calcium   9.3 


 


Total Bilirubin   0.5 


 


AST   101 H 


 


ALT   191 H 


 


Alkaline Phosphatase   71 


 


Total Protein   8.3 H 


 


Albumin   3.9 


 


Urine Color   


 


Urine Appearance   


 


Urine pH   


 


Ur Specific Gravity   


 


Urine Protein   


 


Urine Glucose (UA)   


 


Urine Ketones   


 


Urine Blood   


 


Urine Nitrite   


 


Urine Bilirubin   


 


Urine Urobilinogen   


 


Ur Leukocyte Esterase   


 


RPR Titer    Nonreactive














  18





  09:00


 


WBC 


 


RBC 


 


Hgb 


 


Hct 


 


MCV 


 


MCH 


 


MCHC 


 


RDW 


 


Plt Count 


 


MPV 


 


Sodium 


 


Potassium 


 


Chloride 


 


Carbon Dioxide 


 


Anion Gap 


 


BUN 


 


Creatinine 


 


Creat Clearance w eGFR 


 


Random Glucose 


 


Calcium 


 


Total Bilirubin 


 


AST 


 


ALT 


 


Alkaline Phosphatase 


 


Total Protein 


 


Albumin 


 


Urine Color  Yellow


 


Urine Appearance  Turbid


 


Urine pH  5.0


 


Ur Specific Gravity  1.020


 


Urine Protein  Negative


 


Urine Glucose (UA)  Negative


 


Urine Ketones  Negative


 


Urine Blood  Negative


 


Urine Nitrite  Negative


 


Urine Bilirubin  Negative


 


Urine Urobilinogen  Negative


 


Ur Leukocyte Esterase  Negative


 


RPR Titer 








Labs reviewed


Assessment: 





18 15:33


Withdrawal symptoms





Noted with elevated b/p secondary to withdrawal


Plan: 





Continue detox


Encouraged PO water hydration





Elevated blood pressure without dx of HTN: most likely secondary to withdrawal: 

clonidine 0.1mg PO q8hr prn, encourage relaxation techniques (deep breathing 

exercises, socializing with peers/staff, watching TV, attending activities etc.)

, continue other prn medications

## 2018-09-09 NOTE — CONSULT
[facility] Psychiatric Consult





- Data


Date of interview: 09/09/18


Admission source: Self-referred


Identifying data: Mr Rios is a 47 years old [place] male, father of 

a 20 years old daughter, self-employed in demolition, domiciled seeking detox 

treatment for alcohol and cannabis


Medical History: Significant for hyperlipidemia, PPD+ and history of surgeries(

traumatic tranmetetarsal amputation of left foot in 1991, fracture of nasal and 

right rib).


Psychiatric History: Denies history of previous psychiatric hospitalizations. 

[place], reports history of brief OPD care at [facility] for 2-3 months in 

2013. There he was diagnosed with Bipolar Disorder and tried on mood 

stabilizers including Depakote and atypical agents. He also reported a few CPEP 

visits at [facility] where he was held for overnight observation for 

alcohol intoxication. According to a previous admision to this facility in 

Februarey 2018, He reported OPD treatment with Depakote, Seroquel and Trazadone 

at [facility]. Denies history of suicide attempts. At present, reports 

feeling anxious and sleeping poorly.


Physical/Sexual Abuse/Trauma History: Denies history of emotional, physical or 

sexual abuse as wel as DV relationship. No  service


Additional Comment: Reports history of 5 previous arrests including one felony 

conviction. Denies being on parole/probation at present





Mental Status Exam





- Mental Status Exam


Alert and Oriented to: Time, Place, Person


Cognitive Function: Fair


Patient Appearance: Well Groomed


Mood: Anxious


Affect: Appropriate


Patient Behavior: Cooperative


Speech Pattern: Clear


Voice Loudness: Normal


Thought Process: Intact, Goal Oriented


Hallucinations: Denies


Suicidal Ideation: Denies


Homicidal Ideation: Denies


Insight/Judgement: Poor


Sleep: Poorly


Appetite: Good


Muscle strength/Tone: Normal


Gait/Station: Normal





Psychiatric Findings





- Problem List (Axis 1, 2,3)


(1) Substance induced mood disorder


Current Visit: Yes   Status: Chronic   





(2) Bipolar disorder


Current Visit: Yes   Status: Ruled-out   





(3) Substance-induced anxiety disorder


Current Visit: Yes   Status: Acute   





(4) Substance-induced sleep disorder


Current Visit: Yes   Status: Acute   





(5) Alcohol dependence with uncomplicated withdrawal


Current Visit: Yes   Status: Acute   





(6) Cannabis abuse


Current Visit: Yes   Status: Acute   





(7) History of positive PPD


Current Visit: Yes   Status: Chronic   





(8) Status post transmetatarsal amputation of left foot


Current Visit: Yes   Status: Chronic   





- Initial Treatment Plan


Initial Treatment Plan: Continue inpatient detoxification

## 2018-09-10 RX ADMIN — Medication SCH TAB: at 10:05

## 2018-09-10 RX ADMIN — HYDROXYZINE PAMOATE PRN MG: 50 CAPSULE ORAL at 16:52

## 2018-09-10 RX ADMIN — Medication SCH MG: at 22:10

## 2018-09-10 RX ADMIN — Medication PRN MG: at 22:10

## 2018-09-10 NOTE — PN
[facility] CIWA





- CIWA Score


Nausea/Vomitin


Muscle Tremors: 3


Anxiety: 3


Agitation: 2


Paroxysmal Sweats: 3


Orientation: 0-Oriented


Tacttile Disturbances: 0-None


Auditory Disturbances: 0-None


Visual Disturbances: 0-None


Headache: 0-None Present


CIWA-Ar Total Score: 13





BHS Progress Note (SOAP)


Subjective: 





shakes


sleep disturbance


sweats


Objective: 





09/10/18 13:16


 A & O x 3


In day room watching TV


 Vital Signs











Temperature  98.5 F   09/10/18 13:15


 


Pulse Rate  100 H  09/10/18 13:15


 


Respiratory Rate  20   09/10/18 13:15


 


Blood Pressure  128/92   09/10/18 13:15


 


O2 Sat by Pulse Oximetry (%)      











Assessment: 





09/10/18 13:16


withdrawal sx


Plan: 





continue detox

## 2018-09-10 NOTE — EKG
Test Reason : 

Blood Pressure : ***/*** mmHG

Vent. Rate : 089 BPM     Atrial Rate : 089 BPM

   P-R Int : 150 ms          QRS Dur : 094 ms

    QT Int : 358 ms       P-R-T Axes : 059 044 028 degrees

   QTc Int : 435 ms

 

NORMAL SINUS RHYTHM

NORMAL ECG

WHEN COMPARED WITH ECG OF 27-JUL-2018 22:50,

NO SIGNIFICANT CHANGE WAS FOUND

Confirmed by CARLOS AMXWELL MD ([id number]) on 9/10/2018 11:23:59 AM

 

Referred By:             Confirmed By:CARLOS MAXWELL MD

## 2018-09-11 RX ADMIN — Medication SCH MG: at 22:18

## 2018-09-11 RX ADMIN — Medication SCH TAB: at 10:03

## 2018-09-11 RX ADMIN — LOPERAMIDE HYDROCHLORIDE PRN MG: 2 CAPSULE ORAL at 13:45

## 2018-09-11 RX ADMIN — Medication PRN MG: at 22:19

## 2018-09-11 NOTE — PN
[facility] Progress Note (SOAP)


Subjective: 





DECREASE ANXIETY, SWEATS. DETOX PROCEEDING WELL.


Objective: 





09/11/18 13:01


 Vital Signs











  09/11/18 09/11/18





  06:36 09:04


 


Temperature 97.0 F L 97.9 F


 


Pulse Rate 67 92 H


 


Respiratory 18 16





Rate  


 


Blood Pressure 128/80 139/93








 Laboratory Tests











  09/09/18 09/09/18 09/09/18





  08:00 08:00 08:00


 


WBC  3.8 L  


 


RBC  4.64  


 


Hgb  13.3  


 


Hct  39.8  


 


MCV  85.6  


 


MCH  28.6  


 


MCHC  33.4  


 


RDW  15.4  


 


Plt Count  221  


 


MPV  8.0  


 


Sodium   141 


 


Potassium   4.1 


 


Chloride   105 


 


Carbon Dioxide   26 


 


Anion Gap   10 


 


BUN   14 


 


Creatinine   1.0 


 


Creat Clearance w eGFR   > 60 


 


Random Glucose   97 


 


Calcium   9.3 


 


Total Bilirubin   0.5 


 


AST   101 H 


 


ALT   191 H 


 


Alkaline Phosphatase   71 


 


Total Protein   8.3 H 


 


Albumin   3.9 


 


Urine Color   


 


Urine Appearance   


 


Urine pH   


 


Ur Specific Gravity   


 


Urine Protein   


 


Urine Glucose (UA)   


 


Urine Ketones   


 


Urine Blood   


 


Urine Nitrite   


 


Urine Bilirubin   


 


Urine Urobilinogen   


 


Ur Leukocyte Esterase   


 


RPR Titer    Nonreactive














  09/09/18





  09:00


 


WBC 


 


RBC 


 


Hgb 


 


Hct 


 


MCV 


 


MCH 


 


MCHC 


 


RDW 


 


Plt Count 


 


MPV 


 


Sodium 


 


Potassium 


 


Chloride 


 


Carbon Dioxide 


 


Anion Gap 


 


BUN 


 


Creatinine 


 


Creat Clearance w eGFR 


 


Random Glucose 


 


Calcium 


 


Total Bilirubin 


 


AST 


 


ALT 


 


Alkaline Phosphatase 


 


Total Protein 


 


Albumin 


 


Urine Color  Yellow


 


Urine Appearance  Turbid


 


Urine pH  5.0


 


Ur Specific Gravity  1.020


 


Urine Protein  Negative


 


Urine Glucose (UA)  Negative


 


Urine Ketones  Negative


 


Urine Blood  Negative


 


Urine Nitrite  Negative


 


Urine Bilirubin  Negative


 


Urine Urobilinogen  Negative


 


Ur Leukocyte Esterase  Negative


 


RPR Titer 











Assessment: 





09/11/18 13:02


WITHDRAWAL SX


Plan: 





CONTINUE DETOX

## 2018-09-12 VITALS — HEART RATE: 109 BPM | SYSTOLIC BLOOD PRESSURE: 135 MMHG | TEMPERATURE: 97.1 F | DIASTOLIC BLOOD PRESSURE: 85 MMHG

## 2018-09-12 RX ADMIN — Medication SCH TAB: at 10:03

## 2018-09-12 RX ADMIN — IBUPROFEN PRN MG: 400 TABLET, FILM COATED ORAL at 10:04

## 2018-09-12 NOTE — PN
[facility] Progress Note (SOAP)


Subjective: 





DETOX COMPLETED. 


Objective: 





09/12/18 08:49


 Vital Signs











  09/12/18 09/12/18





  03:30 06:11


 


Temperature  97.7 F


 


Pulse Rate  75


 


Respiratory 18 18





Rate  


 


Blood Pressure  144/93








 Laboratory Tests











  09/09/18 09/09/18 09/09/18





  08:00 08:00 08:00


 


WBC  3.8 L  


 


RBC  4.64  


 


Hgb  13.3  


 


Hct  39.8  


 


MCV  85.6  


 


MCH  28.6  


 


MCHC  33.4  


 


RDW  15.4  


 


Plt Count  221  


 


MPV  8.0  


 


Sodium   141 


 


Potassium   4.1 


 


Chloride   105 


 


Carbon Dioxide   26 


 


Anion Gap   10 


 


BUN   14 


 


Creatinine   1.0 


 


Creat Clearance w eGFR   > 60 


 


Random Glucose   97 


 


Calcium   9.3 


 


Total Bilirubin   0.5 


 


AST   101 H 


 


ALT   191 H 


 


Alkaline Phosphatase   71 


 


Total Protein   8.3 H 


 


Albumin   3.9 


 


Urine Color   


 


Urine Appearance   


 


Urine pH   


 


Ur Specific Gravity   


 


Urine Protein   


 


Urine Glucose (UA)   


 


Urine Ketones   


 


Urine Blood   


 


Urine Nitrite   


 


Urine Bilirubin   


 


Urine Urobilinogen   


 


Ur Leukocyte Esterase   


 


RPR Titer    Nonreactive














  09/09/18





  09:00


 


WBC 


 


RBC 


 


Hgb 


 


Hct 


 


MCV 


 


MCH 


 


MCHC 


 


RDW 


 


Plt Count 


 


MPV 


 


Sodium 


 


Potassium 


 


Chloride 


 


Carbon Dioxide 


 


Anion Gap 


 


BUN 


 


Creatinine 


 


Creat Clearance w eGFR 


 


Random Glucose 


 


Calcium 


 


Total Bilirubin 


 


AST 


 


ALT 


 


Alkaline Phosphatase 


 


Total Protein 


 


Albumin 


 


Urine Color  Yellow


 


Urine Appearance  Turbid


 


Urine pH  5.0


 


Ur Specific Gravity  1.020


 


Urine Protein  Negative


 


Urine Glucose (UA)  Negative


 


Urine Ketones  Negative


 


Urine Blood  Negative


 


Urine Nitrite  Negative


 


Urine Bilirubin  Negative


 


Urine Urobilinogen  Negative


 


Ur Leukocyte Esterase  Negative


 


RPR Titer 











Assessment: 





09/12/18 08:49


MEDICALLY STABLE


Plan: 





D/C PT TODAY

## 2018-09-12 NOTE — DS
[facility] Detox Discharge Summary


Admission Date: 


09/08/18





Discharge Date: 09/12/18





- History


Present History: Alcohol Dependence


Additional Comments: 





DETOX COMPLETED. ALERT O X 3. NAD.  FOLLOW UP WITH PCP DR. JEROME  AT [facility] IN [place], [place]


Pertinent Past History: 





PLEASE SEE DX BELOW





- Physical Exam Results


Vital Signs: 


 Vital Signs











Temperature  97.7 F   09/12/18 06:11


 


Pulse Rate  75   09/12/18 06:11


 


Respiratory Rate  18   09/12/18 06:11


 


Blood Pressure  144/93   09/12/18 06:11


 


O2 Sat by Pulse Oximetry (%)      











Pertinent Admission Physical Exam Findings: 





WITHDRAWAL SX


 Laboratory Tests











  09/09/18 09/09/18 09/09/18





  08:00 08:00 08:00


 


WBC  3.8 L  


 


RBC  4.64  


 


Hgb  13.3  


 


Hct  39.8  


 


MCV  85.6  


 


MCH  28.6  


 


MCHC  33.4  


 


RDW  15.4  


 


Plt Count  221  


 


MPV  8.0  


 


Sodium   141 


 


Potassium   4.1 


 


Chloride   105 


 


Carbon Dioxide   26 


 


Anion Gap   10 


 


BUN   14 


 


Creatinine   1.0 


 


Creat Clearance w eGFR   > 60 


 


Random Glucose   97 


 


Calcium   9.3 


 


Total Bilirubin   0.5 


 


AST   101 H 


 


ALT   191 H 


 


Alkaline Phosphatase   71 


 


Total Protein   8.3 H 


 


Albumin   3.9 


 


Urine Color   


 


Urine Appearance   


 


Urine pH   


 


Ur Specific Gravity   


 


Urine Protein   


 


Urine Glucose (UA)   


 


Urine Ketones   


 


Urine Blood   


 


Urine Nitrite   


 


Urine Bilirubin   


 


Urine Urobilinogen   


 


Ur Leukocyte Esterase   


 


RPR Titer    Nonreactive














  09/09/18





  09:00


 


WBC 


 


RBC 


 


Hgb 


 


Hct 


 


MCV 


 


MCH 


 


MCHC 


 


RDW 


 


Plt Count 


 


MPV 


 


Sodium 


 


Potassium 


 


Chloride 


 


Carbon Dioxide 


 


Anion Gap 


 


BUN 


 


Creatinine 


 


Creat Clearance w eGFR 


 


Random Glucose 


 


Calcium 


 


Total Bilirubin 


 


AST 


 


ALT 


 


Alkaline Phosphatase 


 


Total Protein 


 


Albumin 


 


Urine Color  Yellow


 


Urine Appearance  Turbid


 


Urine pH  5.0


 


Ur Specific Gravity  1.020


 


Urine Protein  Negative


 


Urine Glucose (UA)  Negative


 


Urine Ketones  Negative


 


Urine Blood  Negative


 


Urine Nitrite  Negative


 


Urine Bilirubin  Negative


 


Urine Urobilinogen  Negative


 


Ur Leukocyte Esterase  Negative


 


RPR Titer 














- Treatment


Hospital Course: Detox Protocol Followed, Detoxed Safely, Responded well, 

Discharged Condition Good, Rehab Referral Accepted


Patient has Accepted a Rehab Referral to: [facility]





- Medication


Discharge Medications: 


Ambulatory Orders





NK [No Known Home Medication]  10/18/17 











- Diagnosis


(1) HLD (hyperlipidemia)


Status: Suspected   


Qualifiers: 


   Hyperlipidemia type: unspecified   Qualified Code(s): E78.5 - Hyperlipidemia

, unspecified   





(2) Status post transmetatarsal amputation of left foot


Status: Chronic   





(3) Alcohol dependence with uncomplicated withdrawal


Status: Acute   





- AMA


Did Patient Leave Against Medical Advice: No

## 2018-11-05 ENCOUNTER — HOSPITAL ENCOUNTER (INPATIENT)
Dept: HOSPITAL 74 - YASAS | Age: 48
LOS: 3 days | Discharge: HOME | End: 2018-11-08
Attending: INTERNAL MEDICINE | Admitting: INTERNAL MEDICINE
Payer: COMMERCIAL

## 2018-11-05 VITALS — BODY MASS INDEX: 28.1 KG/M2

## 2018-11-05 DIAGNOSIS — F10.230: Primary | ICD-10-CM

## 2018-11-05 DIAGNOSIS — R76.11: ICD-10-CM

## 2018-11-05 DIAGNOSIS — E87.6: ICD-10-CM

## 2018-11-05 DIAGNOSIS — Z89.432: ICD-10-CM

## 2018-11-05 DIAGNOSIS — E78.00: ICD-10-CM

## 2018-11-05 DIAGNOSIS — E78.5: ICD-10-CM

## 2018-11-05 DIAGNOSIS — R94.5: ICD-10-CM

## 2018-11-05 DIAGNOSIS — F32.9: ICD-10-CM

## 2018-11-05 DIAGNOSIS — F31.9: ICD-10-CM

## 2018-11-05 PROCEDURE — HZ2ZZZZ DETOXIFICATION SERVICES FOR SUBSTANCE ABUSE TREATMENT: ICD-10-PCS | Performed by: INTERNAL MEDICINE

## 2018-11-05 RX ADMIN — Medication SCH MG: at 22:48

## 2018-11-05 NOTE — HP
CIWA Score





- CIWA Score


Nausea/Vomitin-Mild Nausea/No Vomiting


Muscle Tremors: 2


Anxiety: 2


Agitation: 3


Paroxysmal Sweats: 1-Minimal Palms Moist


Orientation: 1-Uncertain about Date (no distress)


Tacttile Disturbances: 1-Very Mild Itch/Numbness


Auditory Disturbances: 0-None


Visual Disturbances: 1-Very Mild Sensitivity


Headache: 0-None Present


CIWA-Ar Total Score: 12





Admission ROS BHS





- HPI


Chief Complaint: 





alcohol withdrawal sx 


Allergies/Adverse Reactions: 


 Allergies











Allergy/AdvReac Type Severity Reaction Status Date / Time


 


No Known Allergies Allergy   Verified 18 17:39











History of Present Illness: 





47 yo male with hx of alcohol dependence is here seeking detox, this is one of 

multiple admissions. Last detox [facility] 18 -18.  Utox positive for Benzo'

s denies benzo use or visits to the emergency room. Denies suicidal / homicidal 

ideation.Reports no significant period of sobriety 





Exam Limitations: No Limitations





- Ebola screening


Have you traveled outside of the country in the last 21 days: No (N)


Have you had contact with anyone from an Ebola affected area: No


Have you been sick,other than usual withdrawal symptoms: No


Do you have a fever: No





- Review of Systems


Constitutional: Loss of Appetite, Changes in sleep


EENT: reports: No Symptoms Reported


Respiratory: reports: No Symptoms reported


Cardiac: reports: No Symptoms Reported


GI: reports: Poor Appetite, Poor Fluid Intake


: reports: No Symptoms Reported


Musculoskeletal: reports: No Symptoms Reported


Integumentary: reports: Pruritus


Neuro: reports: Dizziness


Endocrine: reports: Increased Thirst


Hematology: reports: No Symptoms Reported


Psychiatric: reports: Orientated x3, Depressed


Other Systems: Reviewed and Negative





Patient History





- Patient Medical History


Hx Anemia: No


Hx Asthma: No


Hx Chronic Obstructive Pulmonary Disease (COPD): No


Hx Cancer: No


Hx Cardiac Disorders: No


Hx Congestive Heart Failure: No


Hx Hypertension: Yes (When withdrawing, not on meds)


Hx Hypercholesterolemia: Yes (Not on meds)


Hx Pacemaker: No


HX Cerebrovascular Accident: No


Hx Seizures: No


Hx Dementia: No


Hx Diabetes: No


Hx Gastrointestinal Disorders: No


Hx Liver Disease: No


Hx Genitourinary Disorders: No


Hx Sexually Transmitted Disorders: No


Hx Renal Disease (ESRD): No


Hx Thyroid Disease: No


Hx Human Immunodeficiency Virus (HIV): No


Hx Hepatitis C: No


Hx Depression: Yes (NEVER TX'ED)


Hx Suicide Attempt: No


Hx Bipolar Disorder: No


Hx Schizophrenia: No





- Patient Surgical History


Past Surgical History: Yes


Hx Neurologic Surgery: No


Hx Cataract Extraction: No


Hx Cardiac Surgery: No


Hx Lung Surgery: No


Hx Breast Surgery: No


Hx Breast Biopsy: No


Hx Abdominal Surgery: No


Hx Appendectomy: No


Hx Cholecystectomy: No


Hx Genitourinary Surgery: No


Hx  Section: No


Hx Orthopedic Surgery: Yes (traumatic transmetatarsal amputation of left foot 

in  in [facility])


Hx Hysterectomy: No


Other Surgical History: h/o nasal and rt rib fx's in past 


Anesthesia Reaction: No





- PPD History


Previous Implant?: No


Documented Results: Negative w/proof


Date: 18


Results: positive


PPD to be Administered?: Yes





- Smoking Cessation


Smoking history: Never smoked


Have you smoked in the past 12 months: No


Aproximately how many cigarettes per day: 0


If you are a former smoker, when did you quit?: more than year ago


Cigars Per Day: 0


Hx Chewing Tobacco Use: No


Initiated information on smoking cessation: No





- Substance & Tx. History


Hx Alcohol Use: Yes


Hx Substance Use: Yes


Substance Use Type: Alcohol


Hx Substance Use Treatment: Yes ( Last detox [facility] 18 -18. )





- Substances Abused


  ** Alcohol


Route: Oral


Frequency: Daily


Amount used: LIQUOR- 3 PINTS, BEER- 4 SIX PACKS


Age of first use: 15


Date of Last Use: 18





Family Disease History





- Family Disease History


Family Disease History: Respiratory: Father (living, ALCOHOL), Other: Mother (

living, RA), Brother (two - living - healthy), Sister (one - living - healthy), 

Daughter (one - age 18 - healthy)





Admission Physical Exam [facility]





- Vital Signs


Vital Signs: 


 Vital Signs - 24 hr











  18





  17:35


 


Temperature 96.7 F L


 


Pulse Rate 110 H


 


Respiratory 18





Rate 


 


Blood Pressure 134/104 H














- Physical


General Appearance: Yes: Appropriately Dressed, Alcohol on Breath, Anxious


HEENTM: Yes: EOMI, Hearing grossly Normal, Normal ENT Inspection, Normocephalic

, Normal Voice, SHANNON, Pharynx Normal, Tm's normal, Other (cheilithis)


Respiratory: Yes: Chest Non-Tender, Lungs Clear, Normal Breath Sounds, No 

Respiratory Distress, No Accessory Muscle Use


Neck: Yes: Within Normal Limits


Breast: Yes: Breast Exam Deferred


Cardiology: Yes: Regular Rhythm, Tachycardia


Abdominal: Yes: Normal Bowel Sounds, Non Tender, Flat, Soft


Genitourinary: Yes: Within Normal Limits


Back: Yes: Normal Inspection


Musculoskeletal: Yes: full range of Motion, Gait Steady, Pelvis Stable


Extremities: Yes: Normal Capillary Refill, Normal Inspection, Normal Range of 

Motion, Non-Tender


Neurological: Yes: CNs II-XII NML intact, Fully Oriented, Alert, Motor Strength 

5/5, Depressed Affect


Integumentary: Yes: Normal Color


Lymphatic: Yes: Within Normal Limits





- Diagnostic


(1) Alcohol dependence with uncomplicated withdrawal


Current Visit: Yes   Status: Acute   





(2) Elevated blood pressure reading without diagnosis of hypertension


Current Visit: Yes   Status: Acute   





Cleared for Admission [facility]





- Detox or Rehab


[facility] Level of Care: Medically Managed


Detox Regimen/Protocol: Librium





BHS Breath Alcohol Content


Breath Alcohol Content: 0.118





Urine Drug Screen





- Results


Drug Screen Negative: No


Urine Drug Screen Results: BZO-Benzodiazepines

## 2018-11-06 LAB
ALBUMIN SERPL-MCNC: 3.9 G/DL (ref 3.4–5)
ALP SERPL-CCNC: 77 U/L (ref 45–117)
ALT SERPL-CCNC: 144 U/L (ref 13–61)
ANION GAP SERPL CALC-SCNC: 14 MMOL/L (ref 8–16)
APPEARANCE UR: CLEAR
AST SERPL-CCNC: 91 U/L (ref 15–37)
BILIRUB SERPL-MCNC: 0.3 MG/DL (ref 0.2–1)
BILIRUB UR STRIP.AUTO-MCNC: NEGATIVE MG/DL
BUN SERPL-MCNC: 16 MG/DL (ref 7–18)
CALCIUM SERPL-MCNC: 8.9 MG/DL (ref 8.5–10.1)
CHLORIDE SERPL-SCNC: 99 MMOL/L (ref 98–107)
CO2 SERPL-SCNC: 25 MMOL/L (ref 21–32)
COLOR UR: (no result)
CREAT SERPL-MCNC: 1.1 MG/DL (ref 0.55–1.3)
DEPRECATED RDW RBC AUTO: 13.9 % (ref 11.9–15.9)
GLUCOSE SERPL-MCNC: 104 MG/DL (ref 74–106)
HCT VFR BLD CALC: 41.1 % (ref 35.4–49)
HGB BLD-MCNC: 13.6 GM/DL (ref 11.7–16.9)
KETONES UR QL STRIP: NEGATIVE
LEUKOCYTE ESTERASE UR QL STRIP.AUTO: NEGATIVE
MCH RBC QN AUTO: 28.5 PG (ref 25.7–33.7)
MCHC RBC AUTO-ENTMCNC: 33 G/DL (ref 32–35.9)
MCV RBC: 86.2 FL (ref 80–96)
NITRITE UR QL STRIP: NEGATIVE
PH UR: 6 [PH] (ref 5–8)
PLATELET # BLD AUTO: 222 K/MM3 (ref 134–434)
PMV BLD: 8.1 FL (ref 7.5–11.1)
POTASSIUM SERPLBLD-SCNC: 3.4 MMOL/L (ref 3.5–5.1)
PROT SERPL-MCNC: 8.3 G/DL (ref 6.4–8.2)
PROT UR QL STRIP: NEGATIVE
PROT UR QL STRIP: NEGATIVE
RBC # BLD AUTO: 4.77 M/MM3 (ref 4–5.6)
SODIUM SERPL-SCNC: 139 MMOL/L (ref 136–145)
SP GR UR: 1 (ref 1.01–1.03)
UROBILINOGEN UR STRIP-MCNC: NEGATIVE MG/DL (ref 0.2–1)
WBC # BLD AUTO: 4.7 K/MM3 (ref 4–10)

## 2018-11-06 RX ADMIN — Medication SCH MG: at 22:24

## 2018-11-06 RX ADMIN — Medication SCH TAB: at 10:07

## 2018-11-06 NOTE — CONSULT
[facility] Psychiatric Consult





- Data


Date of interview: 11/06/18


Admission source: [facility]


Identifying data: Readmission to [facility] for this 49 y/o [place]born male, 

seeking detoxification treatment, on [address], for alcohol dependence. Patient 

is , a father of one, domiciled and currently employed.


Substance Abuse History: Discussed with the patient in this session. Details in 

current BHS report : Smoking history: Never smoked.  Have you smoked in the 

past 12 months: No.  Aproximately how many cigarettes per day: 0.  If you are a 

former smoker, when did you quit?: more than year ago.  Cigars Per Day: 0.  Hx 

Chewing Tobacco Use: No.  Initiated information on smoking cessation: No.  - 

Substance & Tx. History.  Hx Alcohol Use: Yes.  Hx Substance Use: Yes.  

Substance Use Type: Alcohol.  Hx Substance Use Treatment: Yes ( Last detox [facility] 

9/8/18 -9/12/18. ).  - Substances Abused.  ** Alcohol.  Route: Oral.  Frequency

: Daily.  Amount used: LIQUOR- 3 PINTS, BEER- 4 SIX PACKS.  Age of first use: 

15.  Date of Last Use: 11/05/18


Medical History: Dyslipidemia, positive PPD and a history of orthopedic surgery 

(traumatic transmetatarsal amputation of left foot in 1991).


Psychiatric History: Patient denies history of psychiatric hospitalizations. 

Admits to a brief affiliation with the [facility] clinic, 

in the [place], where he received treatment for 3 months (2013). medicated, at 

the time, with valproate and secong generation antipsychotic medications. 

Reportedly diagnosed with Bipolar Disorder (patient disagrees). " I have been 

told otherwise by other doctors ". History of a few CPEP visits (usually 

occurred in a context of ETOH intoxication ; assessed and discharged after 

observation overnight).  Lost to psychiatric follow up for months. Patient 

denies history of suicide attempts.


Physical/Sexual Abuse/Trauma History: No history.


Additional Comment: Urine Drug Screen Results: BZO-Benzodiazepines. Noted.





Mental Status Exam





- Mental Status Exam


Alert and Oriented to: Time, Place, Person


Cognitive Function: Good


Patient Appearance: Well Groomed


Mood: Hopeful, Euthymic


Affect: Appropriate, Normal Range


Patient Behavior: Appropriate, Cooperative


Speech Pattern: Clear, Appropriate


Voice Loudness: Normal


Thought Process: Intact, Goal Oriented


Thought Disorder: Not Present


Hallucinations: Denies


Suicidal Ideation: Denies


Homicidal Ideation: Denies


Insight/Judgement: Fair


Sleep: Well


Appetite: Good


Muscle strength/Tone: Normal


Gait/Station: Normal





Psychiatric Findings





- Problem List (Axis 1, 2,3)


(1) Alcohol dependence with uncomplicated withdrawal


Current Visit: Yes   Status: Acute   





- Initial Treatment Plan


Initial Treatment Plan: Psychoeducation. Sleep hygiene. Detoxification in 

progress. AA meetings recommended. Relapse prevention discussed with the 

patient. Made aware of the benefits FDA-approved formulation (naltrexone) + 12 

step fellowship + psychotherapy for the maintenance of sobriety. Vaibhav Rios 

remains ambivalent about immediate aftercare plans. Observation.

## 2018-11-06 NOTE — PN
[facility] CIWA





- CIWA Score


Nausea/Vomitin


Muscle Tremors: 4-Moderate,w/Arms Extend


Anxiety: 4-Mod. Anxious/Guarded


Agitation: 4-Moderately Restless


Paroxysmal Sweats: 3


Orientation: 0-Oriented


Tacttile Disturbances: 0-None


Auditory Disturbances: 0-None


Visual Disturbances: 0-None


Headache: 0-None Present


CIWA-Ar Total Score: 17





BHS Progress Note (SOAP)


Subjective: 





Interrupted sleep, sweating, anxious


Objective: 





18 13:11


 Last Vital Signs











Temp Pulse Resp BP Pulse Ox


 


 98.1 F   113 H  18   134/86    


 


 18 09:24  18 09:24  18 09:24  18 09:24   








 Laboratory Tests











  18





  00:01 07:00 07:00


 


WBC   4.7 


 


RBC   4.77 


 


Hgb   13.6 


 


Hct   41.1 


 


MCV   86.2 


 


MCH   28.5 


 


MCHC   33.0 


 


RDW   13.9 


 


Plt Count   222 


 


MPV   8.1 


 


Sodium    139


 


Potassium    3.4 L


 


Chloride    99


 


Carbon Dioxide    25


 


Anion Gap    14


 


BUN    16


 


Creatinine    1.1


 


Creat Clearance w eGFR    > 60


 


Random Glucose    104


 


Calcium    8.9


 


Total Bilirubin    0.3


 


AST    91 H


 


ALT    144 H


 


Alkaline Phosphatase    77


 


Total Protein    8.3 H


 


Albumin    3.9


 


Urine Color  Straw  


 


Urine Appearance  Clear  


 


Urine pH  6.0  


 


Ur Specific Gravity  1.004 L  


 


Urine Protein  Negative  


 


Urine Glucose (UA)  Negative  


 


Urine Ketones  Negative  


 


Urine Blood  Negative  


 


Urine Nitrite  Negative  


 


Urine Bilirubin  Negative  


 


Urine Urobilinogen  Negative  


 


Ur Leukocyte Esterase  Negative  








Labs reviewed: K 3.4, AST 91, 


Assessment: 





18 13:17


Withdrawal symptoms





Noted with mild hypokalemia and elevated LFTs


Plan: 





Continue detox


Encouraged PO water intake





Hypokalemia: K Dur 40 Meq PO x 1 dose, repeat serum K level in AM





Elevated LFTs: repeat AST, ALT

## 2018-11-07 LAB
ALT SERPL-CCNC: 138 U/L (ref 13–61)
AST SERPL-CCNC: 71 U/L (ref 15–37)
POTASSIUM SERPLBLD-SCNC: 3.8 MMOL/L (ref 3.5–5.1)

## 2018-11-07 RX ADMIN — Medication SCH MG: at 22:09

## 2018-11-07 RX ADMIN — Medication SCH TAB: at 10:18

## 2018-11-07 NOTE — PN
[facility] CIWA





- CIWA Score


Nausea/Vomitin-No Nausea/No Vomiting


Muscle Tremors: None


Anxiety: 1-Mildly Anxious


Agitation: 1-Slight > Activity


Paroxysmal Sweats: No Perspiration


Orientation: 0-Oriented


Tacttile Disturbances: 0-None


Auditory Disturbances: 0-None


Visual Disturbances: 0-None


Headache: 0-None Present


CIWA-Ar Total Score: 2





BHS Progress Note (SOAP)


Subjective: 





PATIENT REPORTS FEELING WELL. C/O MILD ANXIETY ABOUT JOB AND FAMILY. DENIES 

SHAKES, SWEATING, N/V/D. 


Objective: 





18 11:49


 Vital Signs











Temperature  98.5 F   18 10:00


 


Pulse Rate  111 H  18 10:00


 


Respiratory Rate  20   18 10:00


 


Blood Pressure  126/85   18 10:00


 


O2 Sat by Pulse Oximetry (%)      








 Laboratory Tests











  18





  00:01 07:00 07:00


 


WBC   4.7 


 


RBC   4.77 


 


Hgb   13.6 


 


Hct   41.1 


 


MCV   86.2 


 


MCH   28.5 


 


MCHC   33.0 


 


RDW   13.9 


 


Plt Count   222 


 


MPV   8.1 


 


Sodium    139


 


Potassium    3.4 L


 


Chloride    99


 


Carbon Dioxide    25


 


Anion Gap    14


 


BUN    16


 


Creatinine    1.1


 


Creat Clearance w eGFR    > 60


 


Random Glucose    104


 


Calcium    8.9


 


Total Bilirubin    0.3


 


AST    91 H


 


ALT    144 H


 


Alkaline Phosphatase    77


 


Total Protein    8.3 H


 


Albumin    3.9


 


Urine Color  Straw  


 


Urine Appearance  Clear  


 


Urine pH  6.0  


 


Ur Specific Gravity  1.004 L  


 


Urine Protein  Negative  


 


Urine Glucose (UA)  Negative  


 


Urine Ketones  Negative  


 


Urine Blood  Negative  


 


Urine Nitrite  Negative  


 


Urine Bilirubin  Negative  


 


Urine Urobilinogen  Negative  


 


Ur Leukocyte Esterase  Negative  


 


RPR Titer   














  18





  07:00 07:30


 


WBC  


 


RBC  


 


Hgb  


 


Hct  


 


MCV  


 


MCH  


 


MCHC  


 


RDW  


 


Plt Count  


 


MPV  


 


Sodium  


 


Potassium   3.8


 


Chloride  


 


Carbon Dioxide  


 


Anion Gap  


 


BUN  


 


Creatinine  


 


Creat Clearance w eGFR  


 


Random Glucose  


 


Calcium  


 


Total Bilirubin  


 


AST   71 H


 


ALT   138 H


 


Alkaline Phosphatase  


 


Total Protein  


 


Albumin  


 


Urine Color  


 


Urine Appearance  


 


Urine pH  


 


Ur Specific Gravity  


 


Urine Protein  


 


Urine Glucose (UA)  


 


Urine Ketones  


 


Urine Blood  


 


Urine Nitrite  


 


Urine Bilirubin  


 


Urine Urobilinogen  


 


Ur Leukocyte Esterase  


 


RPR Titer  Nonreactive 








PE:





SKIN WARM AND DRY


CAR S1S2, AUSCULTATED RATE 92


RESP CTA BL


EXT FULL ROM


MILDLY ANXIOUS, PACING IN HALLWAY








Assessment: 





18 11:50


WITHDRAWAL SX


HYPOKALEMIA RESOLVED, K+ 3.8


LFTS IMPROVING ALTHOUGH REMAINS ELEVATED





Plan: 





CONTINUE DETOX REGIMEN


ENCOURAGE ORAL FLUIDS


PATIENT REQUESTED ONE DAY EARLY DISCHARGE


CLINICALLY STABLE TO BE D/C IN AM AT THIS TIME


WILL CONTINUE TO MONITOR

## 2018-11-07 NOTE — EKG
Test Reason : 

Blood Pressure : ***/*** mmHG

Vent. Rate : 079 BPM     Atrial Rate : 079 BPM

   P-R Int : 144 ms          QRS Dur : 102 ms

    QT Int : 366 ms       P-R-T Axes : 058 059 044 degrees

   QTc Int : 419 ms

 

NORMAL SINUS RHYTHM

NORMAL ECG

WHEN COMPARED WITH ECG OF 08-SEP-2018 18:42,

NO SIGNIFICANT CHANGE WAS FOUND

Confirmed by DORINA ALEJANDRE MD ([id number]) on 11/7/2018 9:51:35 AM

 

Referred By:             Confirmed By:DORINA ALEJANDRE MD

## 2018-11-08 VITALS — TEMPERATURE: 97.4 F | HEART RATE: 72 BPM | DIASTOLIC BLOOD PRESSURE: 75 MMHG | SYSTOLIC BLOOD PRESSURE: 128 MMHG

## 2018-11-08 NOTE — DS
[facility]HS Detox Discharge Summary


Admission Date: 


11/05/18








- History


Present History: Alcohol Dependence


Pertinent Past History: 





HLD





- Physical Exam Results


Vital Signs: 


 Vital Signs











Temperature  97.4 F L  11/08/18 04:00


 


Pulse Rate  72   11/08/18 04:00


 


Respiratory Rate  18   11/08/18 04:00


 


Blood Pressure  128/75   11/08/18 04:00


 


O2 Sat by Pulse Oximetry (%)      











Pertinent Admission Physical Exam Findings: 





Withdrawal sxs





 Laboratory Tests











  11/06/18 11/06/18 11/06/18





  00:01 07:00 07:00


 


WBC   4.7 


 


RBC   4.77 


 


Hgb   13.6 


 


Hct   41.1 


 


MCV   86.2 


 


MCH   28.5 


 


MCHC   33.0 


 


RDW   13.9 


 


Plt Count   222 


 


MPV   8.1 


 


Sodium    139


 


Potassium    3.4 L


 


Chloride    99


 


Carbon Dioxide    25


 


Anion Gap    14


 


BUN    16


 


Creatinine    1.1


 


Creat Clearance w eGFR    > 60


 


Random Glucose    104


 


Calcium    8.9


 


Total Bilirubin    0.3


 


AST    91 H


 


ALT    144 H


 


Alkaline Phosphatase    77


 


Total Protein    8.3 H


 


Albumin    3.9


 


Urine Color  Straw  


 


Urine Appearance  Clear  


 


Urine pH  6.0  


 


Ur Specific Gravity  1.004 L  


 


Urine Protein  Negative  


 


Urine Glucose (UA)  Negative  


 


Urine Ketones  Negative  


 


Urine Blood  Negative  


 


Urine Nitrite  Negative  


 


Urine Bilirubin  Negative  


 


Urine Urobilinogen  Negative  


 


Ur Leukocyte Esterase  Negative  


 


RPR Titer   














  11/06/18 11/07/18





  07:00 07:30


 


WBC  


 


RBC  


 


Hgb  


 


Hct  


 


MCV  


 


MCH  


 


MCHC  


 


RDW  


 


Plt Count  


 


MPV  


 


Sodium  


 


Potassium   3.8


 


Chloride  


 


Carbon Dioxide  


 


Anion Gap  


 


BUN  


 


Creatinine  


 


Creat Clearance w eGFR  


 


Random Glucose  


 


Calcium  


 


Total Bilirubin  


 


AST   71 H


 


ALT   138 H


 


Alkaline Phosphatase  


 


Total Protein  


 


Albumin  


 


Urine Color  


 


Urine Appearance  


 


Urine pH  


 


Ur Specific Gravity  


 


Urine Protein  


 


Urine Glucose (UA)  


 


Urine Ketones  


 


Urine Blood  


 


Urine Nitrite  


 


Urine Bilirubin  


 


Urine Urobilinogen  


 


Ur Leukocyte Esterase  


 


RPR Titer  Nonreactive 








Labs reviewed





- Treatment


Hospital Course: Detox Protocol Followed, Detoxed Safely, Responded well, 

Discharged Condition Good





- Medication


Discharge Medications: 


Ambulatory Orders





NK [No Known Home Medication]  10/18/17 











- Diagnosis


(1) Hypokalemia


Status: Acute   





(2) Elevated LFTs


Status: Acute   





(3) Alcohol dependence with uncomplicated withdrawal


Status: Acute   





(4) Elevated blood pressure reading without diagnosis of hypertension


Status: Acute   





(5) depression


Status: Chronic   





(6) HLD (hyperlipidemia)


Status: Chronic   


Qualifiers: 


   Hyperlipidemia type: unspecified   Qualified Code(s): E78.5 - Hyperlipidemia

, unspecified   





- AMA


Did Patient Leave Against Medical Advice: No (F/U with your PCP within 1-2 weeks

)

## 2019-01-11 ENCOUNTER — HOSPITAL ENCOUNTER (INPATIENT)
Dept: HOSPITAL 74 - YASAS | Age: 49
LOS: 5 days | Discharge: HOME | End: 2019-01-16
Attending: INTERNAL MEDICINE | Admitting: INTERNAL MEDICINE
Payer: COMMERCIAL

## 2019-01-11 VITALS — BODY MASS INDEX: 27.3 KG/M2

## 2019-01-11 DIAGNOSIS — R00.0: ICD-10-CM

## 2019-01-11 DIAGNOSIS — E78.5: ICD-10-CM

## 2019-01-11 DIAGNOSIS — F10.230: Primary | ICD-10-CM

## 2019-01-11 DIAGNOSIS — F19.24: ICD-10-CM

## 2019-01-11 DIAGNOSIS — E87.6: ICD-10-CM

## 2019-01-11 PROCEDURE — HZ2ZZZZ DETOXIFICATION SERVICES FOR SUBSTANCE ABUSE TREATMENT: ICD-10-PCS | Performed by: INTERNAL MEDICINE

## 2019-01-11 NOTE — HP
CIWA Score


Nausea/Vomitin-Mild Nausea/No Vomiting


Muscle Tremors: 1-None Visible, but Felt


Anxiety: 1-Mildly Anxious


Agitation: 2


Paroxysmal Sweats: 3


Orientation: 1-Uncertain about Date


Tacttile Disturbances: 0-None


Auditory Disturbances: 0-None


Visual Disturbances: 1-Very Mild Sensitivity


Headache: 2-Mild


CIWA-Ar Total Score: 12





- Admission Criteria


OASAS Guidelines: Admission for Medically Managed Detox: 


Requires at least one of the followin. CIWA greater than 12


2. Seizures within the past 24 hours


3. Delirium tremens within the past 24 hours


4. Hallucinations within the past 24 hours


5. Acute intervention needed for co  occurring medical disorder


6. Acute intervention needed for co  occurring psychiatric disorder


7. Severe withdrawal that cannot be handled at a lower level of care (continued


    vomiting, continued diarrhea, abnormal vital signs) requiring intravenous


    medication and/or fluids


8. Pregnancy





Patient presents the following: CIWA greater than 12


Admission Criteria Met: Admission criteria met





Admission ROS [facility]





- [facility]


Chief Complaint: 





C/O WORSENING WITHDRAWAL SX'S  SEEKING DETOX TXMENT.


Allergies/Adverse Reactions: 


 Allergies











Allergy/AdvReac Type Severity Reaction Status Date / Time


 


No Known Allergies Allergy   Verified 18 17:39











History of Present Illness: 





48 Y.O. MALE WITH HX/O ALCOHOL DEPENDENCE HERE SEEKING DETOX. CLIENT PRESENTED 

WITH LUCINDA OF .242 W/O WITHDRAWAL SXS. SEATED IN WAITING AREA FOR HOURS NOW 

RETURNS WITH C/O WORSENING WITHDRAWAL SX'S, CIWA 12 WITH REPEAT LUCINDA NOW 0.178. 

HE IS SELF REFERRED TODAY. HE IS KNOWN TO OUR SERVICE. LAST HERE 2018. 

REPORTS LONGEST CLEAN TIME 2 YEARS SELF SUSTAINED. DENIES PAST/PRESENT SI/HI, 

AVH, SEIZURE D/O, DT'S. CURRENTLY LIVES ALONE IN [place], EMPLOYED, DENIES LEGALS





PMHX- DENIES


PSYCH- DENIES 


Exam Limitations: No Limitations





- Ebola screening


Have you traveled outside of the country in the last 21 days: No


Have you had contact with anyone from an Ebola affected area: No


Have you been sick,other than usual withdrawal symptoms: No


Do you have a fever: No





- Review of Systems


Constitutional: Chills, Loss of Appetite


EENT: reports: No Symptoms Reported


Respiratory: reports: No Symptoms reported


Cardiac: reports: No Symptoms Reported


GI: reports: Nausea, Poor Appetite, Poor Fluid Intake


: reports: No Symptoms Reported


Musculoskeletal: reports: No Symptoms Reported


Integumentary: reports: Flushing


Neuro: reports: Headache


Endocrine: reports: No Symptoms Reported


Hematology: reports: No Symptoms Reported


Psychiatric: reports: Anxious


Other Systems: Reviewed and Negative





Patient History





- Patient Medical History


Hx Anemia: No


Hx Asthma: No


Hx Chronic Obstructive Pulmonary Disease (COPD): No


Hx Cancer: No


Hx Cardiac Disorders: No


Hx Congestive Heart Failure: No


Hx Hypertension: No


Hx Hypercholesterolemia: Yes (Not on meds)


Hx Pacemaker: No


HX Cerebrovascular Accident: No


Hx Seizures: No


Hx Dementia: No


Hx Diabetes: No


Hx Gastrointestinal Disorders: No


Hx Liver Disease: No


Hx Genitourinary Disorders: No


Hx Sexually Transmitted Disorders: No


Hx Renal Disease (ESRD): No


Hx Thyroid Disease: No


Hx Human Immunodeficiency Virus (HIV): No


Hx Hepatitis C: No


Hx Depression: No


Hx Suicide Attempt: No


Hx Bipolar Disorder: No


Hx Schizophrenia: No


Other Medical History: DENIES





- Patient Surgical History


Past Surgical History: Yes


Hx Neurologic Surgery: No


Hx Cataract Extraction: No


Hx Cardiac Surgery: No


Hx Lung Surgery: No


Hx Breast Surgery: No


Hx Breast Biopsy: No


Hx Abdominal Surgery: No


Hx Appendectomy: No


Hx Cholecystectomy: No


Hx Genitourinary Surgery: No


Hx  Section: No


Hx Orthopedic Surgery: Yes (traumatic transmetatarsal amputation of left foot 

in  in [facility])


Hx Hysterectomy: No


Other Surgical History: h/o nasal and rt rib fx's in past 


Anesthesia Reaction: No





- PPD History


Previous Implant?: Yes


Documented Results: Negative w/proof


Implanted On Prior R Admission?: Yes


Date: 18 (CXR NEG 2018)


Results: positive


PPD to be Administered?: No





- Smoking Cessation


Smoking history: Never smoked


Have you smoked in the past 12 months: No


Aproximately how many cigarettes per day: 0


If you are a former smoker, when did you quit?: more than year ago


Cigars Per Day: 0


Hx Chewing Tobacco Use: No


Initiated information on smoking cessation: No





- Substance & Tx. History


Hx Alcohol Use: Yes


Hx Substance Use: No


Substance Use Type: Alcohol


Hx Substance Use Treatment: Yes ([facility])





- Substances Abused


  ** BEER


Route: Oral


Frequency: Daily


Amount used: 3- 12 PACK/ 1PINT


Age of first use: 15


Date of Last Use: 01/11/19





Family Disease History





- Family Disease History


Family Disease History: Respiratory: Father (living, ALCOHOL), Other: Mother (

living, RA), Brother (two - living - healthy), Sister (one - living - healthy), 

Daughter (one - age 18 - healthy)





Admission Physical Exam [facility]





- Vital Signs


Vital Signs: 


 Vital Signs - 24 hr











  19





  18:05


 


Temperature 98.5 F


 


Pulse Rate 120 H


 


Respiratory 18





Rate 


 


Blood Pressure 130/77














- Physical


General Appearance: Yes: Appropriately Dressed, Tremorous (FELT), Sweating (

FLUSHED), Anxious


HEENTM: Yes: EOMI, Normocephalic, Normal Voice, SHANNON, Pharynx Normal


Respiratory: Yes: Chest Non-Tender, Lungs Clear, Normal Breath Sounds, No 

Respiratory Distress, No Accessory Muscle Use


Neck: Yes: No masses,lesions,Nodules, Supple, Trachea in good position


Breast: Yes: Breast Exam Deferred


Cardiology: Yes: Regular Rhythm, S1, S2, Tachycardia


Abdominal: Yes: Normal Bowel Sounds, Non Tender, Flat, Soft


Genitourinary: Yes: Other (NO C/O OFFERED)


Back: Yes: Normal Inspection


Musculoskeletal: Yes: full range of Motion, Gait Steady


Extremities: Yes: Normal Range of Motion, Non-Tender, Tremors (FELT)


Neurological: Yes: Fully Oriented, Alert, Motor Strength 5/5, Other (ANXIOUS)


Integumentary: Yes: Warm, Other (FLUSHED)


Lymphatic: Yes: Within Normal Limits





- Diagnostic


(1) Alcohol dependence with uncomplicated withdrawal


Current Visit: Yes   Status: Acute   





(2) Substance-induced anxiety disorder


Current Visit: Yes   Status: Acute   





(3) HLD (hyperlipidemia)


Current Visit: Yes   Status: Chronic   


Qualifiers: 


   Hyperlipidemia type: unspecified   Qualified Code(s): E78.5 - Hyperlipidemia

, unspecified   





(4) History of positive PPD


Current Visit: Yes   Status: Chronic   





Cleared for Admission [facility]





- Detox or Rehab


[facility] Level of Care: Medically Managed


Detox Regimen/Protocol: Librium


Claeared for Rehab Admission: No





[facility] Breath Alcohol Content


Breath Alcohol Content: 0.242





Urine Drug Screen





- Results


Drug Screen Negative: No


Urine Drug Screen Results: BZO-Benzodiazepines

## 2019-01-12 LAB
ALBUMIN SERPL-MCNC: 3.8 G/DL (ref 3.4–5)
ALP SERPL-CCNC: 63 U/L (ref 45–117)
ALT SERPL-CCNC: 131 U/L (ref 13–61)
ANION GAP SERPL CALC-SCNC: 12 MMOL/L (ref 8–16)
AST SERPL-CCNC: 76 U/L (ref 15–37)
BILIRUB SERPL-MCNC: 0.3 MG/DL (ref 0.2–1)
BUN SERPL-MCNC: 16 MG/DL (ref 7–18)
CALCIUM SERPL-MCNC: 9.2 MG/DL (ref 8.5–10.1)
CHLORIDE SERPL-SCNC: 99 MMOL/L (ref 98–107)
CO2 SERPL-SCNC: 27 MMOL/L (ref 21–32)
CREAT SERPL-MCNC: 1.1 MG/DL (ref 0.55–1.3)
DEPRECATED RDW RBC AUTO: 14.4 % (ref 11.9–15.9)
GLUCOSE SERPL-MCNC: 94 MG/DL (ref 74–106)
HCT VFR BLD CALC: 41.4 % (ref 35.4–49)
HGB BLD-MCNC: 13.6 GM/DL (ref 11.7–16.9)
MCH RBC QN AUTO: 28.3 PG (ref 25.7–33.7)
MCHC RBC AUTO-ENTMCNC: 32.8 G/DL (ref 32–35.9)
MCV RBC: 86.1 FL (ref 80–96)
PLATELET # BLD AUTO: 232 K/MM3 (ref 134–434)
PMV BLD: 7.9 FL (ref 7.5–11.1)
POTASSIUM SERPLBLD-SCNC: 3.3 MMOL/L (ref 3.5–5.1)
PROT SERPL-MCNC: 8 G/DL (ref 6.4–8.2)
RBC # BLD AUTO: 4.81 M/MM3 (ref 4–5.6)
SODIUM SERPL-SCNC: 138 MMOL/L (ref 136–145)
WBC # BLD AUTO: 4.2 K/MM3 (ref 4–10)

## 2019-01-12 RX ADMIN — Medication SCH TAB: at 10:13

## 2019-01-12 RX ADMIN — Medication PRN MG: at 22:07

## 2019-01-12 RX ADMIN — POTASSIUM CHLORIDE SCH MEQ: 1500 TABLET, EXTENDED RELEASE ORAL at 19:06

## 2019-01-12 RX ADMIN — ALUMINUM HYDROXIDE, MAGNESIUM HYDROXIDE, AND SIMETHICONE PRN ML: 200; 200; 20 SUSPENSION ORAL at 13:52

## 2019-01-12 RX ADMIN — Medication SCH MG: at 22:06

## 2019-01-12 NOTE — PN
[facility] CIWA





- CIWA Score


Nausea/Vomitin-No Nausea/No Vomiting


Muscle Tremors: None


Anxiety: 4-Mod. Anxious/Guarded


Agitation: 3


Paroxysmal Sweats: No Perspiration


Orientation: 0-Oriented


Tacttile Disturbances: 3-Moderate Itch/Numb/Burn


Auditory Disturbances: 2-Mild Harshness/Frighten


Visual Disturbances: 1-Very Mild Sensitivity


Headache: 0-None Present


CIWA-Ar Total Score: 13





[facility] Progress Note (SOAP)


Subjective: 





Interrupted Sleep, Anxious.


Objective: 


PATIENT A & O X 3. IN NO ACUTE DISTRESS.





19 15:31


 Vital Signs











Temperature  98.4 F   19 13:45


 


Pulse Rate  95 H  19 15:00


 


Respiratory Rate  18   19 15:00


 


Blood Pressure  139/81   19 13:45


 


O2 Sat by Pulse Oximetry (%)      








 Laboratory Tests











  19





  07:45 07:45


 


WBC  4.2 


 


RBC  4.81 


 


Hgb  13.6 


 


Hct  41.4 


 


MCV  86.1 


 


MCH  28.3 


 


MCHC  32.8 


 


RDW  14.4 


 


Plt Count  232 


 


MPV  7.9 


 


Sodium   138


 


Potassium   3.3 L


 


Chloride   99


 


Carbon Dioxide   27


 


Anion Gap   12


 


BUN   16


 


Creatinine   1.1


 


Creat Clearance w eGFR   > 60


 


Random Glucose   94


 


Calcium   9.2


 


Total Bilirubin   0.3


 


AST   76 H


 


ALT   131 H


 


Alkaline Phosphatase   63


 


Total Protein   8.0


 


Albumin   3.8








LABS NOTED.


RPR RESULT PENDING.


19 15:33





Assessment: 





19 15:31


WITHDRAWAL SYMPTOMS.


HYPOKALEMIA.





19 15:33





Plan: 





CONTINUE DETOX.


K-DUR BID FOR LOW ADMISSION K LEVEL.

## 2019-01-13 RX ADMIN — Medication PRN MG: at 22:09

## 2019-01-13 RX ADMIN — Medication SCH TAB: at 10:04

## 2019-01-13 RX ADMIN — POTASSIUM CHLORIDE SCH MEQ: 1500 TABLET, EXTENDED RELEASE ORAL at 10:04

## 2019-01-13 RX ADMIN — Medication SCH MG: at 22:09

## 2019-01-13 RX ADMIN — IBUPROFEN PRN MG: 400 TABLET, FILM COATED ORAL at 06:44

## 2019-01-13 RX ADMIN — POTASSIUM CHLORIDE SCH MEQ: 1500 TABLET, EXTENDED RELEASE ORAL at 17:32

## 2019-01-13 NOTE — PN
[facility] CIWA





- CIWA Score


Nausea/Vomitin


Muscle Tremors: 3


Anxiety: 3


Agitation: 3


Paroxysmal Sweats: 3


Orientation: 0-Oriented


Tacttile Disturbances: 0-None


Auditory Disturbances: 0-None


Visual Disturbances: 0-None


Headache: 0-None Present


CIWA-Ar Total Score: 14





BHS Progress Note (SOAP)


Subjective: 





Anxious


Objective: 





19 16:24


 Last Vital Signs











Temp Pulse Resp BP Pulse Ox


 


 98 F   84   16   134/88    


 


 19 14:16  19 14:16  19 14:16  19 14:16   








 Laboratory Tests











  19





  07:45 07:45 07:45


 


WBC  4.2  


 


RBC  4.81  


 


Hgb  13.6  


 


Hct  41.4  


 


MCV  86.1  


 


MCH  28.3  


 


MCHC  32.8  


 


RDW  14.4  


 


Plt Count  232  


 


MPV  7.9  


 


Sodium   138 


 


Potassium   3.3 L 


 


Chloride   99 


 


Carbon Dioxide   27 


 


Anion Gap   12 


 


BUN   16 


 


Creatinine   1.1 


 


Creat Clearance w eGFR   > 60 


 


Random Glucose   94 


 


Calcium   9.2 


 


Total Bilirubin   0.3 


 


AST   76 H 


 


ALT   131 H 


 


Alkaline Phosphatase   63 


 


Total Protein   8.0 


 


Albumin   3.8 


 


RPR Titer    Nonreactive








Labs reviewed: K 3.3 (supplemented)


Assessment: 





19 16:25


Withdrawal symptoms





Noted with hypokalemia


Plan: 





Continue detox





Hypokalemia: supplemented, repeat serum K+ level in AM

## 2019-01-14 RX ADMIN — Medication SCH TAB: at 10:04

## 2019-01-14 RX ADMIN — POTASSIUM CHLORIDE SCH MEQ: 1500 TABLET, EXTENDED RELEASE ORAL at 17:30

## 2019-01-14 RX ADMIN — POTASSIUM CHLORIDE SCH MEQ: 1500 TABLET, EXTENDED RELEASE ORAL at 10:04

## 2019-01-14 RX ADMIN — Medication SCH MG: at 22:07

## 2019-01-14 RX ADMIN — Medication PRN MG: at 22:07

## 2019-01-14 NOTE — PN
BHS Progress Note (SOAP)


Subjective: 





Patient Denies Any Current Withdrawal Symptoms.


Objective: 


PATIENT A & O X 3, OBSERVED AMBULATING ON UNIT. IN NO ACUTE DISTRESS.





01/14/19 13:54


 Vital Signs











Temperature  98.0 F   01/14/19 09:27


 


Pulse Rate  95 H  01/14/19 09:27


 


Respiratory Rate  18   01/14/19 09:27


 


Blood Pressure  130/82   01/14/19 09:27


 


O2 Sat by Pulse Oximetry (%)      








 Laboratory Tests











  01/12/19 01/12/19 01/12/19





  07:45 07:45 07:45


 


WBC  4.2  


 


RBC  4.81  


 


Hgb  13.6  


 


Hct  41.4  


 


MCV  86.1  


 


MCH  28.3  


 


MCHC  32.8  


 


RDW  14.4  


 


Plt Count  232  


 


MPV  7.9  


 


Sodium   138 


 


Potassium   3.3 L 


 


Chloride   99 


 


Carbon Dioxide   27 


 


Anion Gap   12 


 


BUN   16 


 


Creatinine   1.1 


 


Creat Clearance w eGFR   > 60 


 


Random Glucose   94 


 


Calcium   9.2 


 


Total Bilirubin   0.3 


 


AST   76 H 


 


ALT   131 H 


 


Alkaline Phosphatase   63 


 


Total Protein   8.0 


 


Albumin   3.8 


 


RPR Titer    Nonreactive














  01/14/19





  08:15


 


WBC 


 


RBC 


 


Hgb 


 


Hct 


 


MCV 


 


MCH 


 


MCHC 


 


RDW 


 


Plt Count 


 


MPV 


 


Sodium 


 


Potassium  4.2


 


Chloride 


 


Carbon Dioxide 


 


Anion Gap 


 


BUN 


 


Creatinine 


 


Creat Clearance w eGFR 


 


Random Glucose 


 


Calcium 


 


Total Bilirubin 


 


AST 


 


ALT 


 


Alkaline Phosphatase 


 


Total Protein 


 


Albumin 


 


RPR Titer 








LABS NOTED.


RESULT OF REPEAT K LEVEL NOTED. K LEVEL NOW NOTED TO BE WITHIN NORMAL RANGE.


01/14/19 13:55





01/14/19 13:56





Assessment: 





01/14/19 13:56


WITHDRAWAL SYMPTOMS.


Plan: 





CONTINUE DETOX.

## 2019-01-15 RX ADMIN — POTASSIUM CHLORIDE SCH MEQ: 1500 TABLET, EXTENDED RELEASE ORAL at 17:23

## 2019-01-15 RX ADMIN — POTASSIUM CHLORIDE SCH MEQ: 1500 TABLET, EXTENDED RELEASE ORAL at 10:21

## 2019-01-15 RX ADMIN — Medication SCH: at 22:25

## 2019-01-15 RX ADMIN — IBUPROFEN PRN MG: 400 TABLET, FILM COATED ORAL at 05:57

## 2019-01-15 RX ADMIN — Medication SCH TAB: at 10:21

## 2019-01-15 RX ADMIN — ALUMINUM HYDROXIDE, MAGNESIUM HYDROXIDE, AND SIMETHICONE PRN ML: 200; 200; 20 SUSPENSION ORAL at 04:09

## 2019-01-15 NOTE — PN
BHS Progress Note (SOAP)


Subjective: 





Headache.


Objective: 


PATIENT A & O X 3, OBSERVED AMBULATING ON UNIT. IN NO ACUTE DISTRESS.





01/15/19 16:02


 Vital Signs











Temperature  95.4 F L  01/15/19 13:13


 


Pulse Rate  68   01/15/19 13:13


 


Respiratory Rate  18   01/15/19 13:13


 


Blood Pressure  127/82   01/15/19 13:13


 


O2 Sat by Pulse Oximetry (%)      








 Laboratory Tests











  01/12/19 01/12/19 01/12/19





  07:45 07:45 07:45


 


WBC  4.2  


 


RBC  4.81  


 


Hgb  13.6  


 


Hct  41.4  


 


MCV  86.1  


 


MCH  28.3  


 


MCHC  32.8  


 


RDW  14.4  


 


Plt Count  232  


 


MPV  7.9  


 


Sodium   138 


 


Potassium   3.3 L 


 


Chloride   99 


 


Carbon Dioxide   27 


 


Anion Gap   12 


 


BUN   16 


 


Creatinine   1.1 


 


Creat Clearance w eGFR   > 60 


 


Random Glucose   94 


 


Calcium   9.2 


 


Total Bilirubin   0.3 


 


AST   76 H 


 


ALT   131 H 


 


Alkaline Phosphatase   63 


 


Total Protein   8.0 


 


Albumin   3.8 


 


RPR Titer    Nonreactive














  01/14/19





  08:15


 


WBC 


 


RBC 


 


Hgb 


 


Hct 


 


MCV 


 


MCH 


 


MCHC 


 


RDW 


 


Plt Count 


 


MPV 


 


Sodium 


 


Potassium  4.2


 


Chloride 


 


Carbon Dioxide 


 


Anion Gap 


 


BUN 


 


Creatinine 


 


Creat Clearance w eGFR 


 


Random Glucose 


 


Calcium 


 


Total Bilirubin 


 


AST 


 


ALT 


 


Alkaline Phosphatase 


 


Total Protein 


 


Albumin 


 


RPR Titer 








LABS NOTED.


Assessment: 





01/15/19 16:02


WITHDRAWAL SYMPTOMS.


Plan: 





CONTINUE DETOX.


PATIENT PERMITTED TO REMAIN ON DETOX UNIT UNTIL TOMORROW AM, AT WHICH TIME HE 

WILL BE DISCHARGED FROM DETOX UNIT TO PROCEED ON TO 'BRIDGING ACCESS TO CARE' 

INPATIENT PROGRAM ([place], [place]) FOR AFTERCARE (PATIENT UNABLE TO GET 

APPOINTMENT FOR ADMISSION TO PROGRAM TODAY).

## 2019-01-16 VITALS — SYSTOLIC BLOOD PRESSURE: 131 MMHG | DIASTOLIC BLOOD PRESSURE: 90 MMHG | TEMPERATURE: 97.7 F | HEART RATE: 103 BPM

## 2019-01-16 NOTE — DS
[facility] Detox Discharge Summary


Admission Date: 


01/11/19





Discharge Date: 01/16/19





- History


Present History: Alcohol Dependence


Additional Comments: 





48 years old male admitted on 01/11/19 for alcohol withdrawal stabilization 


completed alcohol detox regimen aftercare [facility]





- Physical Exam Results


Vital Signs: 


 Vital Signs











Temperature  97.7 F   01/16/19 06:06


 


Pulse Rate  103 H  01/16/19 06:06


 


Respiratory Rate  18   01/16/19 06:06


 


Blood Pressure  131/90   01/16/19 06:06


 


O2 Sat by Pulse Oximetry (%)      











Pertinent Admission Physical Exam Findings: 





alcohol withdrawal sx


 Laboratory Last Values











WBC  4.2 K/mm3 (4.0-10.0)   01/12/19  07:45    


 


RBC  4.81 M/mm3 (4.00-5.60)   01/12/19  07:45    


 


Hgb  13.6 GM/dL (11.7-16.9)   01/12/19  07:45    


 


Hct  41.4 % (35.4-49)   01/12/19  07:45    


 


MCV  86.1 fl (80-96)   01/12/19  07:45    


 


MCH  28.3 pg (25.7-33.7)   01/12/19  07:45    


 


MCHC  32.8 g/dl (32.0-35.9)   01/12/19  07:45    


 


RDW  14.4 % (11.9-15.9)   01/12/19  07:45    


 


Plt Count  232 K/MM3 (134-434)   01/12/19  07:45    


 


MPV  7.9 fl (7.5-11.1)   01/12/19  07:45    


 


Sodium  138 mmol/L (136-145)   01/12/19  07:45    


 


Potassium  4.2 mmol/L (3.5-5.1)   01/14/19  08:15    


 


Chloride  99 mmol/L ()   01/12/19  07:45    


 


Carbon Dioxide  27 mmol/L (21-32)   01/12/19  07:45    


 


Anion Gap  12 MMOL/L (8-16)   01/12/19  07:45    


 


BUN  16 mg/dL (7-18)   01/12/19  07:45    


 


Creatinine  1.1 mg/dL (0.55-1.3)   01/12/19  07:45    


 


Creat Clearance w eGFR  > 60  (>60)   01/12/19  07:45    


 


Random Glucose  94 mg/dL ()   01/12/19  07:45    


 


Calcium  9.2 mg/dL (8.5-10.1)   01/12/19  07:45    


 


Total Bilirubin  0.3 mg/dL (0.2-1)   01/12/19  07:45    


 


AST  76 U/L (15-37)  H  01/12/19  07:45    


 


ALT  131 U/L (13-61)  H  01/12/19  07:45    


 


Alkaline Phosphatase  63 U/L ()   01/12/19  07:45    


 


Total Protein  8.0 g/dl (6.4-8.2)   01/12/19  07:45    


 


Albumin  3.8 g/dl (3.4-5.0)   01/12/19  07:45    


 


RPR Titer  Nonreactive  (NONREACTIVE)   01/12/19  07:45    








lab noted





- Treatment


Hospital Course: Detox Protocol Followed, Detoxed Safely, Responded well, 

Discharged Condition Good, Rehab Referral Accepted


Patient has Accepted a Rehab Referral to: [facility]





- Medication


Discharge Medications: 


Ambulatory Orders





NK [No Known Home Medication]  10/18/17 











- Diagnosis


(1) Alcohol dependence with uncomplicated withdrawal


Status: Acute   





(2) Substance induced mood disorder


Status: Suspected   





- AMA


Did Patient Leave Against Medical Advice: No

## 2019-04-07 ENCOUNTER — HOSPITAL ENCOUNTER (INPATIENT)
Dept: HOSPITAL 74 - YASAS | Age: 49
LOS: 3 days | Discharge: HOME | End: 2019-04-10
Attending: SURGERY | Admitting: SURGERY
Payer: COMMERCIAL

## 2019-04-07 VITALS — BODY MASS INDEX: 26.6 KG/M2

## 2019-04-07 DIAGNOSIS — R76.11: ICD-10-CM

## 2019-04-07 DIAGNOSIS — E78.5: ICD-10-CM

## 2019-04-07 DIAGNOSIS — F10.230: Primary | ICD-10-CM

## 2019-04-07 DIAGNOSIS — Z89.432: ICD-10-CM

## 2019-04-07 DIAGNOSIS — F19.24: ICD-10-CM

## 2019-04-07 LAB
APPEARANCE UR: (no result)
BILIRUB UR STRIP.AUTO-MCNC: NEGATIVE MG/DL
COLOR UR: YELLOW
KETONES UR QL STRIP: (no result)
LEUKOCYTE ESTERASE UR QL STRIP.AUTO: NEGATIVE
NITRITE UR QL STRIP: NEGATIVE
PH UR: 5.5 [PH] (ref 5–8)
PROT UR QL STRIP: NEGATIVE
PROT UR QL STRIP: NEGATIVE
SP GR UR: 1.03 (ref 1.01–1.03)
UROBILINOGEN UR STRIP-MCNC: 0.2 MG/DL (ref 0.2–1)

## 2019-04-07 PROCEDURE — HZ2ZZZZ DETOXIFICATION SERVICES FOR SUBSTANCE ABUSE TREATMENT: ICD-10-PCS | Performed by: SURGERY

## 2019-04-07 RX ADMIN — IBUPROFEN PRN MG: 400 TABLET, FILM COATED ORAL at 17:27

## 2019-04-07 RX ADMIN — Medication SCH MG: at 22:24

## 2019-04-07 RX ADMIN — METHOCARBAMOL PRN MG: 500 TABLET ORAL at 22:24

## 2019-04-07 NOTE — HP
CIWA Score


Nausea/Vomiting: 3


Muscle Tremors: 2


Anxiety: 3


Agitation: 0-Normal Activity


Paroxysmal Sweats: 2


Orientation: 0-Oriented


Tacttile Disturbances: 2-Mild Itch/Numbness/Burn


Auditory Disturbances: 0-None


Visual Disturbances: 0-None


Headache: 0-None Present


CIWA-Ar Total Score: 12





- Admission Criteria


OASAS Guidelines: Admission for Medically Managed Detox: 


Requires at least one of the followin. CIWA greater than 12


2. Seizures within the past 24 hours


3. Delirium tremens within the past 24 hours


4. Hallucinations within the past 24 hours


5. Acute intervention needed for co  occurring medical disorder


6. Acute intervention needed for co  occurring psychiatric disorder


7. Severe withdrawal that cannot be handled at a lower level of care (continued


    vomiting, continued diarrhea, abnormal vital signs) requiring intravenous


    medication and/or fluids


8. Pregnancy





Patient presents the following: CIWA greater than 12


Admission Criteria Met: Admission criteria met





Admission ROS [facility]





- [facility]


Chief Complaint: 





alcohol detox


Allergies/Adverse Reactions: 


 Allergies











Allergy/AdvReac Type Severity Reaction Status Date / Time


 


No Known Allergies Allergy   Verified 19 10:38











History of Present Illness: 








47 yo male with hx of alcohol dependence is here seeking detox, this is one of 

multiple admissions, d/t relapse. Last detox [facility] 19 -19.  Utox 

positive for Benzo's denies benzo use or visits to the emergency room. LUCINDA = 

0.067. Reports increase alcohol consumption in the past month to 3 six beers ( 

16 oz cans ) + 2 pt. liquor.  Reports hx of anxiety with drinking denies any 

other medical problems. Denies suicidal / homicidal ideation.Reports no 

significant period of sobriety. Denies hx of seizures, reports hx of ETOH 

blackouts with last episode two weeks ago. 


Exam Limitations: No Limitations





- Ebola screening


Have you traveled outside of the country in the last 21 days: No (N)


Have you had contact with anyone from an Ebola affected area: No


Do you have a fever: No





- Review of Systems


Constitutional: Unintentional Wgt. Loss (10 lbs in past three months), Other (

fair appetite)


EENT: reports: No Symptoms Reported


Respiratory: reports: No Symptoms reported


Cardiac: reports: No Symptoms Reported


GI: reports: Nausea, Poor Appetite, Poor Fluid Intake, Abdominal cramping


: reports: No Symptoms Reported


Musculoskeletal: reports: No Symptoms Reported


Integumentary: reports: Pruritus


Neuro: reports: No Symptoms reported


Endocrine: reports: Increased Thirst


Hematology: reports: No Symptoms Reported


Psychiatric: reports: Orientated x3, Anxious





Patient History





- Patient Medical History


Hx Anemia: No


Hx Asthma: No


Hx Chronic Obstructive Pulmonary Disease (COPD): No


Hx Cancer: No


Hx Cardiac Disorders: No


Hx Congestive Heart Failure: No


Hx Hypertension: No


Hx Hypercholesterolemia: Yes (Not on meds)


Hx Pacemaker: No


HX Cerebrovascular Accident: No


Hx Seizures: No


Hx Dementia: No


Hx Diabetes: No


Hx Gastrointestinal Disorders: No


Hx Liver Disease: No


Hx Genitourinary Disorders: No


Hx Sexually Transmitted Disorders: No


Hx Renal Disease (ESRD): No


Hx Thyroid Disease: No


Hx Human Immunodeficiency Virus (HIV): No


Hx Hepatitis C: No


Hx Depression: No


Hx Suicide Attempt: No


Hx Bipolar Disorder: No


Hx Schizophrenia: No





- Patient Surgical History


Past Surgical History: Yes


Hx Neurologic Surgery: No


Hx Cataract Extraction: No


Hx Cardiac Surgery: No


Hx Lung Surgery: No


Hx Breast Surgery: No


Hx Breast Biopsy: No


Hx Abdominal Surgery: No


Hx Appendectomy: No


Hx Cholecystectomy: No


Hx Genitourinary Surgery: No


Hx  Section: No


Hx Orthopedic Surgery: Yes (traumatic transmetatarsal amputation of left foot 

in  in [facility])


Hx Hysterectomy: No


Other Surgical History: h/o nasal and rt rib fx's in past 


Anesthesia Reaction: No





- PPD History


Previous Implant?: No (NEG Chest x-ray )


Documented Results: Positive w/o proof


Date: 18


Results: positive


PPD to be Administered?: No





- Smoking Cessation


Smoking history: Never smoked


Have you smoked in the past 12 months: No


Aproximately how many cigarettes per day: 0


If you are a former smoker, when did you quit?: more than year ago


Cigars Per Day: 0


Hx Chewing Tobacco Use: No


Initiated information on smoking cessation: No





- Substance & Tx. History


Hx Alcohol Use: Yes


Hx Substance Use: No


Substance Use Type: Alcohol


Hx Substance Use Treatment: Yes (Last detox [facility] 19 -19)





- Substances abused


  ** Alcohol


Substance route: Oral


Frequency: Daily


Amount used: 3 six beers ( 16 oz cans ) + 2 pt. liquor


Age of first use: 15


Date of last use: 19





Family Disease History





- Family Disease History


Family Disease History: Respiratory: Father (living, ALCOHOL), Other: Mother (

living, RA), Brother (two - living - healthy), Sister (one - living - healthy), 

Daughter (one - age 18 - healthy)





Admission Physical Exam [facility]





- Vital Signs


Vital Signs: 


 Vital Signs - 24 hr











  19





  10:34


 


Temperature 98 F


 


Pulse Rate 86


 


Respiratory 18





Rate 


 


Blood Pressure 118/80














- Physical


General Appearance: Yes: Appropriately Dressed, Mild Distress, Thin, Anxious


HEENTM: Yes: EOMI, Hearing grossly Normal, Normal ENT Inspection, Normocephalic

, Normal Voice, SHANNON, Pharynx Normal, Tm's normal


Respiratory: Yes: Chest Non-Tender, Lungs Clear, Normal Breath Sounds, No 

Respiratory Distress, No Accessory Muscle Use


Neck: Yes: Within Normal Limits


Breast: Yes: Breast Exam Deferred


Cardiology: Yes: Regular Rhythm, Regular Rate


Abdominal: Yes: Normal Bowel Sounds, Non Tender, Flat, Soft


Genitourinary: Yes: Within Normal Limits


Back: Yes: Normal Inspection


Musculoskeletal: Yes: full range of Motion, Gait Steady, Pelvis Stable


Extremities: Yes: Within Normal Limits


Neurological: Yes: CNs II-XII NML intact, Fully Oriented, Alert, Motor Strength 

5/5, Depressed Affect


Integumentary: Yes: Normal Color, Warm, Diaphoresis, Other (+abrasion on back)


Lymphatic: Yes: Within Normal Limits





- Addiitonal


Findings: 





Patient with mild elevated LFT in last admission in January. Patient refuse 

Ativan paxton, reports librium works better for withdrawal symptoms. Will 

repeat CBC and CMP, if LFT are abnormal will change protocol from librium to 

Ativan .





- Diagnostic


(1) Alcohol dependence with uncomplicated withdrawal


Current Visit: Yes   Status: Acute   





(2) HLD (hyperlipidemia)


Current Visit: Yes   Status: Chronic   


Qualifiers: 


   Hyperlipidemia type: unspecified   Qualified Code(s): E78.5 - Hyperlipidemia

, unspecified   





(3) History of positive PPD


Current Visit: Yes   Status: Chronic   





Cleared for Admission [facility]





- Detox or Rehab


[facility] Level of Care: Medically Managed


Detox Regimen/Protocol: Librium





Breathalyzer





- Breathalyzer


Breathalyzer: 0.067





Urine Drug Screen





- Test Device


Lot number: [id number]


Expiration date: 20





- Control


Is test valid?: Yes





- Results


Drug screen NEGATIVE: No


Urine drug screen results: BZO-Benzodiazepines





Inpatient Rehab Admission





- Rehab Decision to Admit


Inpatient rehab admission?: No

## 2019-04-08 LAB
ALBUMIN SERPL-MCNC: 3.4 G/DL (ref 3.4–5)
ALP SERPL-CCNC: 68 U/L (ref 45–117)
ALT SERPL-CCNC: 55 U/L (ref 13–61)
ANION GAP SERPL CALC-SCNC: 7 MMOL/L (ref 8–16)
AST SERPL-CCNC: 25 U/L (ref 15–37)
BILIRUB SERPL-MCNC: 0.3 MG/DL (ref 0.2–1)
BUN SERPL-MCNC: 20 MG/DL (ref 7–18)
CALCIUM SERPL-MCNC: 9.3 MG/DL (ref 8.5–10.1)
CHLORIDE SERPL-SCNC: 105 MMOL/L (ref 98–107)
CO2 SERPL-SCNC: 27 MMOL/L (ref 21–32)
CREAT SERPL-MCNC: 1 MG/DL (ref 0.55–1.3)
DEPRECATED RDW RBC AUTO: 14.4 % (ref 11.9–15.9)
GLUCOSE SERPL-MCNC: 103 MG/DL (ref 74–106)
HCT VFR BLD CALC: 39.4 % (ref 35.4–49)
HGB BLD-MCNC: 13.2 GM/DL (ref 11.7–16.9)
MCH RBC QN AUTO: 29.2 PG (ref 25.7–33.7)
MCHC RBC AUTO-ENTMCNC: 33.5 G/DL (ref 32–35.9)
MCV RBC: 87.2 FL (ref 80–96)
PLATELET # BLD AUTO: 226 K/MM3 (ref 134–434)
PMV BLD: 8.1 FL (ref 7.5–11.1)
POTASSIUM SERPLBLD-SCNC: 4 MMOL/L (ref 3.5–5.1)
PROT SERPL-MCNC: 7.5 G/DL (ref 6.4–8.2)
RBC # BLD AUTO: 4.52 M/MM3 (ref 4–5.6)
SODIUM SERPL-SCNC: 139 MMOL/L (ref 136–145)
WBC # BLD AUTO: 3.6 K/MM3 (ref 4–10)

## 2019-04-08 RX ADMIN — Medication SCH TAB: at 10:18

## 2019-04-08 RX ADMIN — IBUPROFEN PRN MG: 400 TABLET, FILM COATED ORAL at 10:21

## 2019-04-08 RX ADMIN — Medication SCH MG: at 22:10

## 2019-04-08 RX ADMIN — METHOCARBAMOL PRN MG: 500 TABLET ORAL at 22:11

## 2019-04-08 NOTE — PN
[facility] CIWA





- CIWA Score


Nausea/Vomitin-Mild Nausea/No Vomiting


Muscle Tremors: 2


Anxiety: 1-Mildly Anxious


Agitation: 2


Paroxysmal Sweats: 1-Minimal Palms Moist


Orientation: 1-Uncertain about Date


Tacttile Disturbances: 0-None


Auditory Disturbances: 0-None


Visual Disturbances: 0-None


Headache: 0-None Present


CIWA-Ar Total Score: 8





BHS Progress Note (SOAP)


Subjective: 





feeling ok today more energy for self care


order soap for shower patient preferring estimated discharge date on 04/10/19 

that he has support network in community


Objective: 





19 14:11


 Vital Signs











Temperature  96.8 F L  19 13:30


 


Pulse Rate  86   19 13:30


 


Respiratory Rate  20   19 13:30


 


Blood Pressure  121/76   19 13:30


 


O2 Sat by Pulse Oximetry (%)      








 Laboratory Last Values











WBC  3.6 K/mm3 (4.0-10.0)  L  19  07:00    


 


RBC  4.52 M/mm3 (4.00-5.60)   19  07:00    


 


Hgb  13.2 GM/dL (11.7-16.9)   19  07:00    


 


Hct  39.4 % (35.4-49)   19  07:00    


 


MCV  87.2 fl (80-96)   19  07:00    


 


MCH  29.2 pg (25.7-33.7)   19  07:00    


 


MCHC  33.5 g/dl (32.0-35.9)   19  07:00    


 


RDW  14.4 % (11.9-15.9)   19  07:00    


 


Plt Count  226 K/MM3 (134-434)   19  07:00    


 


MPV  8.1 fl (7.5-11.1)   19  07:00    


 


Sodium  139 mmol/L (136-145)   19  07:00    


 


Potassium  4.0 mmol/L (3.5-5.1)   19  07:00    


 


Chloride  105 mmol/L ()   19  07:00    


 


Carbon Dioxide  27 mmol/L (21-32)   19  07:00    


 


Anion Gap  7 MMOL/L (8-16)  L  19  07:00    


 


BUN  20 mg/dL (7-18)  H  19  07:00    


 


Creatinine  1.0 mg/dL (0.55-1.3)   19  07:00    


 


Creat Clearance w eGFR  80.09  (>60)   19  07:00    


 


Random Glucose  103 mg/dL ()   19  07:00    


 


Calcium  9.3 mg/dL (8.5-10.1)   19  07:00    


 


Total Bilirubin  0.3 mg/dL (0.2-1)   19  07:00    


 


AST  25 U/L (15-37)   19  07:00    


 


ALT  55 U/L (13-61)   19  07:00    


 


Alkaline Phosphatase  68 U/L ()   19  07:00    


 


Total Protein  7.5 g/dl (6.4-8.2)   19  07:00    


 


Albumin  3.4 g/dl (3.4-5.0)   19  07:00    


 


Urine Color  Yellow   19  11:55    


 


Urine Appearance  Turbid   19  11:55    


 


Urine pH  5.5  (5.0-8.0)   19  11:55    


 


Ur Specific Gravity  1.030  (1.010-1.035)   19  11:55    


 


Urine Protein  Negative  (NEGATIVE)   19  11:55    


 


Urine Glucose (UA)  Negative  (NEGATIVE)   19  11:55    


 


Urine Ketones  Trace  (NEGATIVE)  H  19  11:55    


 


Urine Blood  Negative  (NEGATIVE)   19  11:55    


 


Urine Nitrite  Negative  (NEGATIVE)   19  11:55    


 


Urine Bilirubin  Negative  (NEGATIVE)   19  11:55    


 


Urine Urobilinogen  0.2 mg/dL (0.2-1.0)   19  11:55    


 


Ur Leukocyte Esterase  Negative  (NEGATIVE)   19  11:55    








lab noted


Assessment: 





19 14:12


withdrawal sx


Plan: 





continue detox

## 2019-04-09 RX ADMIN — Medication SCH: at 22:18

## 2019-04-09 RX ADMIN — Medication SCH TAB: at 10:37

## 2019-04-09 NOTE — PN
[facility] CIWA





- CIWA Score


Nausea/Vomitin-No Nausea/No Vomiting


Muscle Tremors: 1-None Visible, but Felt


Anxiety: 1-Mildly Anxious


Agitation: 1-Slight > Activity


Paroxysmal Sweats: 1-Minimal Palms Moist


Orientation: 0-Oriented


Tacttile Disturbances: 0-None


Auditory Disturbances: 0-None


Visual Disturbances: 0-None


Headache: 0-None Present


CIWA-Ar Total Score: 4





BHS Progress Note (SOAP)


Subjective: 





feeling better today discuss aftercare with staff


Objective: 





19 11:40


 Vital Signs











Temperature  96.1 F L  19 09:21


 


Pulse Rate  86   19 09:21


 


Respiratory Rate  18   19 09:21


 


Blood Pressure  137/88   19 09:21


 


O2 Sat by Pulse Oximetry (%)      








 Laboratory Last Values











WBC  3.6 K/mm3 (4.0-10.0)  L  19  07:00    


 


RBC  4.52 M/mm3 (4.00-5.60)   19  07:00    


 


Hgb  13.2 GM/dL (11.7-16.9)   19  07:00    


 


Hct  39.4 % (35.4-49)   19  07:00    


 


MCV  87.2 fl (80-96)   19  07:00    


 


MCH  29.2 pg (25.7-33.7)   19  07:00    


 


MCHC  33.5 g/dl (32.0-35.9)   19  07:00    


 


RDW  14.4 % (11.9-15.9)   19  07:00    


 


Plt Count  226 K/MM3 (134-434)   19  07:00    


 


MPV  8.1 fl (7.5-11.1)   19  07:00    


 


Sodium  139 mmol/L (136-145)   19  07:00    


 


Potassium  4.0 mmol/L (3.5-5.1)   19  07:00    


 


Chloride  105 mmol/L ()   19  07:00    


 


Carbon Dioxide  27 mmol/L (21-32)   19  07:00    


 


Anion Gap  7 MMOL/L (8-16)  L  19  07:00    


 


BUN  20 mg/dL (7-18)  H  19  07:00    


 


Creatinine  1.0 mg/dL (0.55-1.3)   19  07:00    


 


Creat Clearance w eGFR  80.09  (>60)   19  07:00    


 


Random Glucose  103 mg/dL ()   19  07:00    


 


Calcium  9.3 mg/dL (8.5-10.1)   19  07:00    


 


Total Bilirubin  0.3 mg/dL (0.2-1)   19  07:00    


 


AST  25 U/L (15-37)   19  07:00    


 


ALT  55 U/L (13-61)   19  07:00    


 


Alkaline Phosphatase  68 U/L ()   19  07:00    


 


Total Protein  7.5 g/dl (6.4-8.2)   19  07:00    


 


Albumin  3.4 g/dl (3.4-5.0)   19  07:00    


 


Urine Color  Yellow   19  11:55    


 


Urine Appearance  Turbid   19  11:55    


 


Urine pH  5.5  (5.0-8.0)   19  11:55    


 


Ur Specific Gravity  1.030  (1.010-1.035)   19  11:55    


 


Urine Protein  Negative  (NEGATIVE)   19  11:55    


 


Urine Glucose (UA)  Negative  (NEGATIVE)   19  11:55    


 


Urine Ketones  Trace  (NEGATIVE)  H  19  11:55    


 


Urine Blood  Negative  (NEGATIVE)   19  11:55    


 


Urine Nitrite  Negative  (NEGATIVE)   19  11:55    


 


Urine Bilirubin  Negative  (NEGATIVE)   19  11:55    


 


Urine Urobilinogen  0.2 mg/dL (0.2-1.0)   19  11:55    


 


Ur Leukocyte Esterase  Negative  (NEGATIVE)   19  11:55    








lab noted


Assessment: 





19 11:41


alcohol withdrawal sx


Plan: 





continue detox

## 2019-04-10 VITALS — DIASTOLIC BLOOD PRESSURE: 87 MMHG | HEART RATE: 79 BPM | TEMPERATURE: 97.1 F | SYSTOLIC BLOOD PRESSURE: 132 MMHG

## 2019-04-10 NOTE — DS
[facility] Detox Discharge Summary


Admission Date: 


04/07/19





Discharge Date: 04/10/19





- History


Present History: Alcohol Dependence


Additional Comments: 





48 years old male admitted on 04/07/19 for alcohol withdrawal stabilization


completed detox regimen aftercare [facility] 

services


patient left around 0650 am writer has not assessed nor evaluated the patient





- Physical Exam Results


Vital Signs: 


 Vital Signs











Temperature  97.1 F L  04/10/19 06:29


 


Pulse Rate  79   04/10/19 06:29


 


Respiratory Rate  18   04/10/19 06:29


 


Blood Pressure  132/87   04/10/19 06:29


 


O2 Sat by Pulse Oximetry (%)      











Pertinent Admission Physical Exam Findings: 





alcohol withdrawal sx


 Laboratory Last Values











WBC  3.6 K/mm3 (4.0-10.0)  L  04/08/19  07:00    


 


RBC  4.52 M/mm3 (4.00-5.60)   04/08/19  07:00    


 


Hgb  13.2 GM/dL (11.7-16.9)   04/08/19  07:00    


 


Hct  39.4 % (35.4-49)   04/08/19  07:00    


 


MCV  87.2 fl (80-96)   04/08/19  07:00    


 


MCH  29.2 pg (25.7-33.7)   04/08/19  07:00    


 


MCHC  33.5 g/dl (32.0-35.9)   04/08/19  07:00    


 


RDW  14.4 % (11.9-15.9)   04/08/19  07:00    


 


Plt Count  226 K/MM3 (134-434)   04/08/19  07:00    


 


MPV  8.1 fl (7.5-11.1)   04/08/19  07:00    


 


Sodium  139 mmol/L (136-145)   04/08/19  07:00    


 


Potassium  4.0 mmol/L (3.5-5.1)   04/08/19  07:00    


 


Chloride  105 mmol/L ()   04/08/19  07:00    


 


Carbon Dioxide  27 mmol/L (21-32)   04/08/19  07:00    


 


Anion Gap  7 MMOL/L (8-16)  L  04/08/19  07:00    


 


BUN  20 mg/dL (7-18)  H  04/08/19  07:00    


 


Creatinine  1.0 mg/dL (0.55-1.3)   04/08/19  07:00    


 


Creat Clearance w eGFR  80.09  (>60)   04/08/19  07:00    


 


Random Glucose  103 mg/dL ()   04/08/19  07:00    


 


Calcium  9.3 mg/dL (8.5-10.1)   04/08/19  07:00    


 


Total Bilirubin  0.3 mg/dL (0.2-1)   04/08/19  07:00    


 


AST  25 U/L (15-37)   04/08/19  07:00    


 


ALT  55 U/L (13-61)   04/08/19  07:00    


 


Alkaline Phosphatase  68 U/L ()   04/08/19  07:00    


 


Total Protein  7.5 g/dl (6.4-8.2)   04/08/19  07:00    


 


Albumin  3.4 g/dl (3.4-5.0)   04/08/19  07:00    


 


Urine Color  Yellow   04/07/19  11:55    


 


Urine Appearance  Turbid   04/07/19  11:55    


 


Urine pH  5.5  (5.0-8.0)   04/07/19  11:55    


 


Ur Specific Gravity  1.030  (1.010-1.035)   04/07/19  11:55    


 


Urine Protein  Negative  (NEGATIVE)   04/07/19  11:55    


 


Urine Glucose (UA)  Negative  (NEGATIVE)   04/07/19  11:55    


 


Urine Ketones  Trace  (NEGATIVE)  H  04/07/19  11:55    


 


Urine Blood  Negative  (NEGATIVE)   04/07/19  11:55    


 


Urine Nitrite  Negative  (NEGATIVE)   04/07/19  11:55    


 


Urine Bilirubin  Negative  (NEGATIVE)   04/07/19  11:55    


 


Urine Urobilinogen  0.2 mg/dL (0.2-1.0)   04/07/19  11:55    


 


Ur Leukocyte Esterase  Negative  (NEGATIVE)   04/07/19  11:55    








lab noted





- Treatment


Hospital Course: Detox Protocol Followed, Detoxed Safely, Responded well, 

Discharged Condition Good, Rehab Referral Accepted


Patient has Accepted a Rehab Referral to: [facility] services





- Medication


Discharge Medications: 


Ambulatory Orders





NK [No Known Home Medication]  10/18/17 











- Diagnosis


(1) Alcohol dependence with uncomplicated withdrawal


Status: Acute   





(2) Substance induced mood disorder


Status: Suspected   





- AMA


Did Patient Leave Against Medical Advice: No

## 2019-06-28 ENCOUNTER — HOSPITAL ENCOUNTER (INPATIENT)
Dept: HOSPITAL 74 - YASAS | Age: 49
LOS: 3 days | Discharge: HOME | End: 2019-07-01
Attending: SURGERY | Admitting: SURGERY
Payer: COMMERCIAL

## 2019-06-28 VITALS — BODY MASS INDEX: 25.4 KG/M2

## 2019-06-28 DIAGNOSIS — Z91.89: ICD-10-CM

## 2019-06-28 DIAGNOSIS — Z11.1: ICD-10-CM

## 2019-06-28 DIAGNOSIS — F10.24: ICD-10-CM

## 2019-06-28 DIAGNOSIS — F17.210: ICD-10-CM

## 2019-06-28 DIAGNOSIS — F10.230: Primary | ICD-10-CM

## 2019-06-28 DIAGNOSIS — Z89.432: ICD-10-CM

## 2019-06-28 PROCEDURE — HZ2ZZZZ DETOXIFICATION SERVICES FOR SUBSTANCE ABUSE TREATMENT: ICD-10-PCS | Performed by: SURGERY

## 2019-06-28 RX ADMIN — Medication SCH MG: at 23:24

## 2019-06-28 NOTE — HP
CIWA Score


Nausea/Vomitin-No Nausea/No Vomiting


Muscle Tremors: None


Anxiety: 4-Mod. Anxious/Guarded


Agitation: 4-Moderately Restless


Paroxysmal Sweats: No Perspiration


Orientation: 2-Disoriented Date<2 days


Tacttile Disturbances: 0-None


Auditory Disturbances: 0-None


Visual Disturbances: 2-Mild Sensitivity


Headache: 2-Mild


CIWA-Ar Total Score: 14





- Admission Criteria


[facility] Guidelines: Admission for Medically Managed Detox: 


Requires at least one of the followin. CIWA greater than 12


2. Seizures within the past 24 hours


3. Delirium tremens within the past 24 hours


4. Hallucinations within the past 24 hours


5. Acute intervention needed for co  occurring medical disorder


6. Acute intervention needed for co  occurring psychiatric disorder


7. Severe withdrawal that cannot be handled at a lower level of care (continued


    vomiting, continued diarrhea, abnormal vital signs) requiring intravenous


    medication and/or fluids


8. Pregnancy





Patient presents the following: CIWA greater than 12


Admission Criteria Met: Admission criteria met





Admission ROS S





- [facility]


Chief Complaint: 





c/o worsening withdrawal sx's. seeking detox


Allergies/Adverse Reactions: 


 Allergies











Allergy/AdvReac Type Severity Reaction Status Date / Time


 


No Known Allergies Allergy   Verified 19 18:33











History of Present Illness: 





48 y.o. male with alcoholism here for detox. he is self referred. known to the 

program. last admitted 2019. client presents with c/o withdrawal sx's. ciwa 

14. reports longest clean time 1.5 years.  drinks 4 straight days out of the 

week. last drank today. denies any significant period of clean time in the past 

year. + black outs but denies hx/o sz. domiciled, unemployed, denies legals


Exam Limitations: No Limitations





- Ebola screening


Have you traveled outside of the country in the last 21 days: No


Have you had contact with anyone from an Ebola affected area: No


Do you have a fever: No





- Review of Systems


Constitutional: Chills, Night Sweats, Changes in sleep


EENT: reports: No Symptoms Reported


Respiratory: reports: No Symptoms reported


Cardiac: reports: No Symptoms Reported


GI: reports: Poor Fluid Intake


: reports: No Symptoms Reported


Musculoskeletal: reports: No Symptoms Reported


Integumentary: reports: Flushing


Neuro: reports: Headache


Endocrine: reports: No Symptoms Reported


Hematology: reports: No Symptoms Reported


Psychiatric: reports: Orientated x3, Agitated (irritable), Anxious


Other Systems: Reviewed and Negative





Patient History





- Patient Medical History


Hx Anemia: No


Hx Asthma: No


Hx Chronic Obstructive Pulmonary Disease (COPD): No


Hx Cancer: No


Hx Cardiac Disorders: No


Hx Congestive Heart Failure: No


Hx Hypertension: No


Hx Hypercholesterolemia: No


Hx Pacemaker: No


HX Cerebrovascular Accident: No


Hx Seizures: No


Hx Dementia: No


Hx Diabetes: No


Hx Gastrointestinal Disorders: No


Hx Liver Disease: No


Hx Genitourinary Disorders: No


Hx Sexually Transmitted Disorders: No


Hx Renal Disease (ESRD): No


Hx Thyroid Disease: No


Hx Human Immunodeficiency Virus (HIV): No


Hx Hepatitis C: No


Hx Depression: No


Hx Suicide Attempt: No


Hx Bipolar Disorder: No


Hx Schizophrenia: No


Other Medical History: denies





- Patient Surgical History


Past Surgical History: Yes


Hx Neurologic Surgery: No


Hx Cataract Extraction: No


Hx Cardiac Surgery: No


Hx Lung Surgery: No


Hx Breast Surgery: No


Hx Breast Biopsy: No


Hx Abdominal Surgery: No


Hx Appendectomy: No


Hx Cholecystectomy: No


Hx Genitourinary Surgery: No


Hx  Section: No


Hx Orthopedic Surgery: Yes ([facility]


Hx Hysterectomy: No


Other Surgical History: h/o nasal and rt rib fx's in past  


Anesthesia Reaction: No





- PPD History


Previous Implant?: Yes


Documented Results: Negative w/proof


Implanted On Prior [facility] Admission?: Yes


Date: 18


Results: positive


PPD to be Administered?: Yes





- Smoking Cessation


Smoking history: Current some day smoker


Have you smoked in the past 12 months: Yes


Aproximately how many cigarettes per day: 1


Cigars Per Day: 0


Hx Chewing Tobacco Use: No


Initiated information on smoking cessation: Yes


'Breaking Loose' booklet given: 19





- Substance & Tx. History


Hx Alcohol Use: Yes


Hx Substance Use: Yes


Substance Use Type: Alcohol


Hx Substance Use Treatment: Yes ([facility])





- Substances abused


  ** Alcohol


Substance route: Oral


Frequency: Daily


Amount used: 6-7 beers/ 21362 oz


Age of first use: 15


Date of last use: 19





Family Disease History





- Family Disease History


Family Disease History: Respiratory: Father (living, ALCOHOL), Other: Mother (

living, RA), Brother (two - living - healthy), Sister (one - living - healthy), 

Daughter (one - age 18 - healthy)





Admission Physical Exam [facility]





- Vital Signs


Vital Signs: 


 Vital Signs - 24 hr











  19





  18:34 20:35


 


Temperature 98.8 F 98.8 F


 


Pulse Rate 85 85


 


Respiratory 16 16





Rate  


 


Blood Pressure 113/76 113/76














- Physical


General Appearance: Yes: Alcohol on Breath, Irritable, Anxious


HEENTM: Yes: EOMI, Normocephalic, Normal Voice, SHANNON, Pharynx Normal


Respiratory: Yes: Chest Non-Tender, Lungs Clear, Normal Breath Sounds, No 

Respiratory Distress, No Accessory Muscle Use


Neck: Yes: No masses,lesions,Nodules, Supple, Trachea in good position


Breast: Yes: Breast Exam Deferred


Cardiology: Yes: Regular Rhythm, Regular Rate, S1, S2


Abdominal: Yes: Normal Bowel Sounds, Non Tender, Soft


Genitourinary: Yes: Within Normal Limits


Back: Yes: Normal Inspection


Musculoskeletal: Yes: full range of Motion, Gait Steady


Extremities: Yes: Normal Capillary Refill, Normal Range of Motion, Non-Tender, 

Amputation (left foot tma), Other


Neurological: Yes: Fully Oriented, Alert, Motor Strength 5/5


Integumentary: Yes: Dry, Warm, Other (flushed)


Lymphatic: Yes: Within Normal Limits





- Diagnostic


(1) At risk for dehydration due to poor fluid intake


Current Visit: Yes   Status: Acute   





(2) Alcohol dependence with uncomplicated withdrawal


Current Visit: Yes   Status: Acute   





(3) Alcohol-induced mood disorder


Current Visit: Yes   Status: Suspected   Comment: client declines psych eval   





(4) Nicotine abuse


Current Visit: Yes   Status: Acute   





(5) PPD screening test


Current Visit: Yes   Status: Chronic   Comment: not ordered due to back order. 

jennifer has neg ppd on file less than 2 years   





(6) Status post transmetatarsal amputation of left foot


Current Visit: Yes   Status: Chronic   Comment: healed   





Cleared for Admission [facility]





- Detox or Rehab


[facility] Level of Care: Medically Managed


Detox Regimen/Protocol: Librium


Claeared for Rehab Admission: No





Breathalyzer





- Breathalyzer


Breathalyzer: 0.156





Urine Drug Screen





- Test Device


Lot number: [id number]


Expiration date: 21





- Control


Is test valid?: Yes





- Results


Drug screen NEGATIVE: No


Urine drug screen results: BZO-Benzodiazepines





Inpatient Rehab Admission





- Rehab Decision to Admit


Inpatient rehab admission?: No

## 2019-06-29 LAB
ALBUMIN SERPL-MCNC: 4.2 G/DL (ref 3.4–5)
ALP SERPL-CCNC: 81 U/L (ref 45–117)
ALT SERPL-CCNC: 42 U/L (ref 13–61)
ANION GAP SERPL CALC-SCNC: 7 MMOL/L (ref 8–16)
APPEARANCE UR: (no result)
AST SERPL-CCNC: 24 U/L (ref 15–37)
BILIRUB SERPL-MCNC: 0.4 MG/DL (ref 0.2–1)
BILIRUB UR STRIP.AUTO-MCNC: NEGATIVE MG/DL
BUN SERPL-MCNC: 18.6 MG/DL (ref 7–18)
CALCIUM SERPL-MCNC: 9.8 MG/DL (ref 8.5–10.1)
CHLORIDE SERPL-SCNC: 99 MMOL/L (ref 98–107)
CO2 SERPL-SCNC: 31 MMOL/L (ref 21–32)
COLOR UR: YELLOW
CREAT SERPL-MCNC: 1 MG/DL (ref 0.55–1.3)
DEPRECATED RDW RBC AUTO: 14.6 % (ref 11.9–15.9)
GLUCOSE SERPL-MCNC: 90 MG/DL (ref 74–106)
HCT VFR BLD CALC: 44.5 % (ref 35.4–49)
HGB BLD-MCNC: 14.9 GM/DL (ref 11.7–16.9)
KETONES UR QL STRIP: (no result)
LEUKOCYTE ESTERASE UR QL STRIP.AUTO: NEGATIVE
MCH RBC QN AUTO: 29.1 PG (ref 25.7–33.7)
MCHC RBC AUTO-ENTMCNC: 33.5 G/DL (ref 32–35.9)
MCV RBC: 86.9 FL (ref 80–96)
NITRITE UR QL STRIP: NEGATIVE
PH UR: 5 [PH] (ref 5–8)
PLATELET # BLD AUTO: 285 K/MM3 (ref 134–434)
PMV BLD: 8.2 FL (ref 7.5–11.1)
POTASSIUM SERPLBLD-SCNC: 3.9 MMOL/L (ref 3.5–5.1)
PROT SERPL-MCNC: 9.2 G/DL (ref 6.4–8.2)
PROT UR QL STRIP: NEGATIVE
PROT UR QL STRIP: NEGATIVE
RBC # BLD AUTO: 5.13 M/MM3 (ref 4–5.6)
SODIUM SERPL-SCNC: 137 MMOL/L (ref 136–145)
SP GR UR: 1.02 (ref 1.01–1.03)
UROBILINOGEN UR STRIP-MCNC: 0.2 MG/DL (ref 0.2–1)
WBC # BLD AUTO: 5.1 K/MM3 (ref 4–10)

## 2019-06-29 RX ADMIN — Medication SCH TAB: at 10:06

## 2019-06-29 RX ADMIN — Medication SCH MG: at 22:18

## 2019-06-29 RX ADMIN — HYDROXYZINE PAMOATE PRN MG: 25 CAPSULE ORAL at 22:19

## 2019-06-29 NOTE — PN
[facility] CIWA





- CIWA Score


Nausea/Vomiting: 3


Muscle Tremors: None


Anxiety: 2


Agitation: 1-Slight > Activity


Paroxysmal Sweats: No Perspiration


Orientation: 0-Oriented


Tacttile Disturbances: 2-Mild Itch/Numbness/Burn


Auditory Disturbances: 2-Mild Harshness/Frighten


Visual Disturbances: 0-None


Headache: 0-None Present


CIWA-Ar Total Score: 10





[facility] Progress Note (SOAP)


Subjective: 





Nausea, Stomach Cramping, Interrupted Sleep.


Objective: 


PATIENT A & O X 3, OBSERVED AMBULATING ON UNIT UNASSISTED. IN NO ACUTE DISTRESS.





06/29/19 14:29


 Vital Signs











Temperature  97.8 F   06/29/19 13:24


 


Pulse Rate  81   06/29/19 13:24


 


Respiratory Rate  18   06/29/19 13:24


 


Blood Pressure  115/78   06/29/19 13:24


 


O2 Sat by Pulse Oximetry (%)      








 Laboratory Tests











  06/29/19 06/29/19 06/29/19





  07:50 07:50 07:50


 


WBC  5.1  


 


RBC  5.13  


 


Hgb  14.9  


 


Hct  44.5  


 


MCV  86.9  


 


MCH  29.1  


 


MCHC  33.5  


 


RDW  14.6  


 


Plt Count  285  D  


 


MPV  8.2  


 


Sodium   137 


 


Potassium   3.9 


 


Chloride   99 


 


Carbon Dioxide   31 


 


Anion Gap   7 L 


 


BUN   18.6 H 


 


Creatinine   1.0 


 


Est GFR (CKD-EPI)AfAm   103.42 


 


Est GFR (CKD-EPI)NonAf   89.23 


 


Random Glucose   90 


 


Calcium   9.8 


 


Total Bilirubin   0.4 


 


AST   24 


 


ALT   42 


 


Alkaline Phosphatase   81 


 


Total Protein   9.2 H 


 


Albumin   4.2 


 


RPR Titer    Nonreactive








LABS NOTED.


Assessment: 





06/29/19 14:29


WITHDRAWAL SYMPTOMS.


Plan: 





CONTINUE DETOX.


INCREASE DAILY PO WATER INTAKE.

## 2019-06-30 RX ADMIN — Medication SCH MG: at 22:05

## 2019-06-30 RX ADMIN — Medication SCH TAB: at 10:04

## 2019-06-30 RX ADMIN — HYDROXYZINE PAMOATE PRN MG: 25 CAPSULE ORAL at 22:06

## 2019-07-01 VITALS — TEMPERATURE: 96.6 F | DIASTOLIC BLOOD PRESSURE: 82 MMHG | SYSTOLIC BLOOD PRESSURE: 128 MMHG | HEART RATE: 112 BPM

## 2019-07-01 NOTE — PN
S CIWA





- CIWA Score


Nausea/Vomitin-No Nausea/No Vomiting


Muscle Tremors: None


Anxiety: 0-No Anxiety, at Ease


Agitation: 1-Slight > Activity


Paroxysmal Sweats: No Perspiration


Orientation: 0-Oriented


Tacttile Disturbances: 0-None


Auditory Disturbances: 0-None


Visual Disturbances: 0-None


Headache: 0-None Present


CIWA-Ar Total Score: 1





[facility] Progress Note (SOAP)


Subjective: 





Patient denies current Withdrawal / Detox symptoms and reports that he feels 

well overall at this time.


Objective: 


PATIENT A & O X 3, OBSERVED AMBULATING ON UNIT UNASSISTED. IN NO ACUTE DISTRESS.





19 17:11


 Vital Signs











Temperature  96.6 F L  19 09:01


 


Pulse Rate  112 H  19 09:01


 


Respiratory Rate  18   19 09:01


 


Blood Pressure  128/82   19 09:01


 


O2 Sat by Pulse Oximetry (%)      








 Laboratory Tests











  19





  07:50 07:50 07:50


 


WBC  5.1  


 


RBC  5.13  


 


Hgb  14.9  


 


Hct  44.5  


 


MCV  86.9  


 


MCH  29.1  


 


MCHC  33.5  


 


RDW  14.6  


 


Plt Count  285  D  


 


MPV  8.2  


 


Sodium   137 


 


Potassium   3.9 


 


Chloride   99 


 


Carbon Dioxide   31 


 


Anion Gap   7 L 


 


BUN   18.6 H 


 


Creatinine   1.0 


 


Est GFR (CKD-EPI)AfAm   103.42 


 


Est GFR (CKD-EPI)NonAf   89.23 


 


Random Glucose   90 


 


Calcium   9.8 


 


Total Bilirubin   0.4 


 


AST   24 


 


ALT   42 


 


Alkaline Phosphatase   81 


 


Total Protein   9.2 H 


 


Albumin   4.2 


 


Urine Color   


 


Urine Appearance   


 


Urine pH   


 


Ur Specific Gravity   


 


Urine Protein   


 


Urine Glucose (UA)   


 


Urine Ketones   


 


Urine Blood   


 


Urine Nitrite   


 


Urine Bilirubin   


 


Urine Urobilinogen   


 


Ur Leukocyte Esterase   


 


RPR Titer    Nonreactive














  19





  19:37


 


WBC 


 


RBC 


 


Hgb 


 


Hct 


 


MCV 


 


MCH 


 


MCHC 


 


RDW 


 


Plt Count 


 


MPV 


 


Sodium 


 


Potassium 


 


Chloride 


 


Carbon Dioxide 


 


Anion Gap 


 


BUN 


 


Creatinine 


 


Est GFR (CKD-EPI)AfAm 


 


Est GFR (CKD-EPI)NonAf 


 


Random Glucose 


 


Calcium 


 


Total Bilirubin 


 


AST 


 


ALT 


 


Alkaline Phosphatase 


 


Total Protein 


 


Albumin 


 


Urine Color  Yellow


 


Urine Appearance  Turbid


 


Urine pH  5.0


 


Ur Specific Gravity  1.023


 


Urine Protein  Negative


 


Urine Glucose (UA)  Negative


 


Urine Ketones  Trace H


 


Urine Blood  Negative


 


Urine Nitrite  Negative


 


Urine Bilirubin  Negative


 


Urine Urobilinogen  0.2


 


Ur Leukocyte Esterase  Negative


 


RPR Titer 








LABS NOTED.


Assessment: 





19 17:11


COMPLETION OF DETOX REGIMEN.


Plan: 





SINCE PATIENT DENIES CURRENT WITHDRAWAL / DETOX SYMPTOMS AND REPORTS THAT HE 

FEELS WELL OVERALL, AT PATIENTS REQUEST, HE WAS GRANTED AN  EARLY DISCHARGE 

FROM DETOX UNIT TODAY SO THAT HE MAY RETURN TO WORK.

## 2019-07-01 NOTE — DS
[facility] Detox Discharge Summary


Admission Date: 


06/28/19





Discharge Date: 07/01/19





- History


Present History: Alcohol Dependence


Additional Comments: 





PATIENT DENIES CURRENT WITHDRAWAL / DETOX SYMPTOMS AND REPORTS THAT HE FEELS 

WELL OVERALL AT TIME OF DISCHARGE FROM DETOX UNIT. PATIENT RETURNING HOME AND 

TO WORK. PATIENT REPORTS THAT HE WILL ATTEND LOCAL 12-STEP / AA OUTPATIENT 

SUPPORT GROUP MEETINGS. PATIENT WAS DISCHARGED FORM DETOX UNIT IN STABLE 

MEDICAL CONDITION.


Pertinent Past History: 





Nicotine Dependence, S/P Transmetatarsal amputation of Left Foot.





- Physical Exam Results


Vital Signs: 


 Vital Signs











Temperature  96.6 F L  07/01/19 09:01


 


Pulse Rate  112 H  07/01/19 09:01


 


Respiratory Rate  18   07/01/19 09:01


 


Blood Pressure  128/82   07/01/19 09:01


 


O2 Sat by Pulse Oximetry (%)      











Pertinent Admission Physical Exam Findings: 





WITHDRAWAL SYMPTOMS.





 Laboratory Tests











  06/29/19 06/29/19 06/29/19





  07:50 07:50 07:50


 


WBC  5.1  


 


RBC  5.13  


 


Hgb  14.9  


 


Hct  44.5  


 


MCV  86.9  


 


MCH  29.1  


 


MCHC  33.5  


 


RDW  14.6  


 


Plt Count  285  D  


 


MPV  8.2  


 


Sodium   137 


 


Potassium   3.9 


 


Chloride   99 


 


Carbon Dioxide   31 


 


Anion Gap   7 L 


 


BUN   18.6 H 


 


Creatinine   1.0 


 


Est GFR (CKD-EPI)AfAm   103.42 


 


Est GFR (CKD-EPI)NonAf   89.23 


 


Random Glucose   90 


 


Calcium   9.8 


 


Total Bilirubin   0.4 


 


AST   24 


 


ALT   42 


 


Alkaline Phosphatase   81 


 


Total Protein   9.2 H 


 


Albumin   4.2 


 


Urine Color   


 


Urine Appearance   


 


Urine pH   


 


Ur Specific Gravity   


 


Urine Protein   


 


Urine Glucose (UA)   


 


Urine Ketones   


 


Urine Blood   


 


Urine Nitrite   


 


Urine Bilirubin   


 


Urine Urobilinogen   


 


Ur Leukocyte Esterase   


 


RPR Titer    Nonreactive














  06/29/19





  19:37


 


WBC 


 


RBC 


 


Hgb 


 


Hct 


 


MCV 


 


MCH 


 


MCHC 


 


RDW 


 


Plt Count 


 


MPV 


 


Sodium 


 


Potassium 


 


Chloride 


 


Carbon Dioxide 


 


Anion Gap 


 


BUN 


 


Creatinine 


 


Est GFR (CKD-EPI)AfAm 


 


Est GFR (CKD-EPI)NonAf 


 


Random Glucose 


 


Calcium 


 


Total Bilirubin 


 


AST 


 


ALT 


 


Alkaline Phosphatase 


 


Total Protein 


 


Albumin 


 


Urine Color  Yellow


 


Urine Appearance  Turbid


 


Urine pH  5.0


 


Ur Specific Gravity  1.023


 


Urine Protein  Negative


 


Urine Glucose (UA)  Negative


 


Urine Ketones  Trace H


 


Urine Blood  Negative


 


Urine Nitrite  Negative


 


Urine Bilirubin  Negative


 


Urine Urobilinogen  0.2


 


Ur Leukocyte Esterase  Negative


 


RPR Titer 








LABS NOTED.





- Treatment


Hospital Course: Detox Protocol Followed, Detoxed Safely, Responded well, 

Discharged Condition Good


Patient has Accepted a Rehab Referral to: PT. WILL ATTEND LOCAL 12-STEP / AA 

OUTPATIENT SUPPORT GROUP MEETINGS.





- Medication


Discharge Medications: 


Ambulatory Orders





NK [No Known Home Medication]  10/18/17 











- Diagnosis


(1) Alcohol dependence with uncomplicated withdrawal


Status: Acute   





(2) At risk for dehydration due to poor fluid intake


Status: Acute   





(3) Nicotine abuse


Status: Acute   





(4) PPD screening test


Status: Chronic   





(5) Status post transmetatarsal amputation of left foot


Status: Chronic   





(6) Alcohol-induced mood disorder


Status: Suspected   





- AMA


Did Patient Leave Against Medical Advice: No

## 2019-08-10 ENCOUNTER — HOSPITAL ENCOUNTER (INPATIENT)
Dept: HOSPITAL 74 - YASAS | Age: 49
LOS: 5 days | Discharge: HOME | End: 2019-08-15
Payer: COMMERCIAL

## 2019-09-29 ENCOUNTER — HOSPITAL ENCOUNTER (INPATIENT)
Dept: HOSPITAL 74 - YASAS | Age: 49
LOS: 3 days | Discharge: HOME | End: 2019-10-02
Attending: SURGERY | Admitting: SURGERY
Payer: COMMERCIAL

## 2019-09-29 VITALS — BODY MASS INDEX: 25 KG/M2

## 2019-09-29 DIAGNOSIS — F12.10: ICD-10-CM

## 2019-09-29 DIAGNOSIS — F10.230: Primary | ICD-10-CM

## 2019-09-29 DIAGNOSIS — Z89.432: ICD-10-CM

## 2019-09-29 DIAGNOSIS — K21.9: ICD-10-CM

## 2019-09-29 DIAGNOSIS — F14.20: ICD-10-CM

## 2019-09-29 PROCEDURE — HZ2ZZZZ DETOXIFICATION SERVICES FOR SUBSTANCE ABUSE TREATMENT: ICD-10-PCS | Performed by: ALLERGY & IMMUNOLOGY

## 2019-09-29 RX ADMIN — Medication PRN MG: at 22:03

## 2019-09-29 RX ADMIN — Medication SCH MG: at 22:03

## 2019-09-29 NOTE — HP
CIWA Score


Nausea/Vomitin


Muscle Tremors: 3


Anxiety: 3


Agitation: 3


Paroxysmal Sweats: 1-Minimal Palms Moist


Orientation: 0-Oriented


Tacttile Disturbances: 1-Very Mild Itch/Numbness


Auditory Disturbances: 0-None


Visual Disturbances: 0-None


Headache: 2-Mild


CIWA-Ar Total Score: 15





- Admission Criteria


OASAS Guidelines: Admission for Medically Managed Detox: 


Requires at least one of the followin. CIWA greater than 12


2. Seizures within the past 24 hours


3. Delirium tremens within the past 24 hours


4. Hallucinations within the past 24 hours


5. Acute intervention needed for co  occurring medical disorder


6. Acute intervention needed for co  occurring psychiatric disorder


7. Severe withdrawal that cannot be handled at a lower level of care (continued


    vomiting, continued diarrhea, abnormal vital signs) requiring intravenous


    medication and/or fluids


8. Pregnancy








Admission ROS [facility]





- HPI


Chief Complaint: 





i need help to stop drinking alcohol and drug


Allergies/Adverse Reactions: 


 Allergies











Allergy/AdvReac Type Severity Reaction Status Date / Time


 


No Known Allergies Allergy   Verified 19 12:40











History of Present Illness: 





this 48 years old male with alcohol ,cocaine  and marijuana dependence seeking 

detox,withdrawal symptom,


multiple admissions in detox but keep relapsing


denied seizure


syncope


longest sobriety 2 years from  to 


plan for rehab after detox








Exam Limitations: No Limitations





- Ebola screening


Have you traveled outside of the country in the last 21 days: No (N)


Have you had contact with anyone from an Ebola affected area: No


Do you have a fever: No





- Review of Systems


Constitutional: Loss of Appetite, Malaise, Night Sweats, Changes in sleep, 

Weakness, Unintentional Wgt. Loss


EENT: reports: Tearing, Nose Congestion


Respiratory: reports: No Symptoms reported


Cardiac: reports: No Symptoms Reported


GI: reports: Nausea, Poor Appetite, Abdominal cramping


: reports: No Symptoms Reported


Musculoskeletal: reports: Back Pain, Muscle Pain, Other (transmetatarsal 

amputation in  fromforklift accident)


Integumentary: reports: Dryness


Neuro: reports: Headache, Tremors


Endocrine: reports: No Symptoms Reported


Hematology: reports: No Symptoms Reported


Psychiatric: reports: No Sypmtoms Reported, Judgement Intact, Mood/Affect 

Appropiate, Orientated x3


Other Systems: Reviewed and Negative





Patient History





- Patient Medical History


Hx Anemia: No


Hx Asthma: No


Hx Chronic Obstructive Pulmonary Disease (COPD): No


Hx Cancer: No


Hx Cardiac Disorders: No


Hx Congestive Heart Failure: No


Hx Hypertension: No


Hx Hypercholesterolemia: No


Hx Pacemaker: No


HX Cerebrovascular Accident: No


Hx Seizures: No


Hx Dementia: No


Hx Diabetes: No


Hx Gastrointestinal Disorders: Yes (GERD)


Hx Liver Disease: No


Hx Genitourinary Disorders: No


Hx Sexually Transmitted Disorders: No


Hx Renal Disease (ESRD): No


Hx Thyroid Disease: No


Hx Human Immunodeficiency Virus (HIV): No (last  negative)


Hx Hepatitis C: No


Hx Depression: Yes (never hospitalized - no meds)


Hx Suicide Attempt: No


Hx Bipolar Disorder: No


Hx Schizophrenia: No


Other Medical History: no suicidal,no homicidal





- Patient Surgical History


Past Surgical History: Yes


Hx Neurologic Surgery: No


Hx Cataract Extraction: No


Hx Cardiac Surgery: No


Hx Lung Surgery: No


Hx Breast Surgery: No


Hx Breast Biopsy: No


Hx Abdominal Surgery: No


Hx Appendectomy: No


Hx Cholecystectomy: No


Hx Genitourinary Surgery: No


Hx  Section: No


Hx Orthopedic Surgery: Yes (traumatic transmetatarsal amputation of left foot 

in  in [facility])


Hx Hysterectomy: No


Other Surgical History: h/o nasal and rt rib fx's in past  


Anesthesia Reaction: No





- PPD History


Previous Implant?: Yes


Documented Results: Positive w/o proof


Implanted On Prior [facility] Admission?: No


Date: 18


Results: chest negative


PPD to be Administered?: No





- Smoking Cessation


Smoking history: Never smoked


Have you smoked in the past 12 months: No


Aproximately how many cigarettes per day: 0


If you are a former smoker, when did you quit?: more than year ago


Cigars Per Day: 0


Hx Chewing Tobacco Use: No





- Substance & Tx. History


Hx Alcohol Use: Yes


Hx Substance Use: Yes


Substance Use Type: Alcohol, Cocaine, Marijuana


Hx Substance Use Treatment: Yes ([facility] 08/10/19 to 08/15/19)





- Substances abused


  ** Alcohol


Substance route: Oral


Frequency: Daily


Amount used: 4-6 six packs of 16 oz beer; 1 pint liquor 


Age of first use: 15


Date of last use: 19





  ** Cocaine


Substance route: Inhalation


Frequency: 1-2 times per week


Amount used: $100-150


Age of first use: 20


Date of last use: 19





  ** Marijuana/Hashish


Substance route: Smoking


Frequency: 1-2 times per week


Amount used: 10$


Age of first use: 18


Date of last use: 19





Admission Physical Exam [facility]





- Vital Signs


Vital Signs: 


 Vital Signs - 24 hr











  19





  12:30


 


Temperature 99.0 F


 


Pulse Rate 83


 


Respiratory 18





Rate 


 


Blood Pressure 126/81














- Physical


General Appearance: Yes: Moderate Distress, Tremorous, Irritable, Sweating, 

Anxious


HEENTM: Yes: SHANNON, Pharynx Normal, Photophobia


Respiratory: Yes: Within Normal Limits, Lungs Clear, Normal Breath Sounds


Neck: Yes: Within Normal Limits, Supple, Trachea in good position


Breast: Yes: Within Normal Limits


Cardiology: Yes: Within Normal Limits, Regular Rhythm, Regular Rate, S1, S2


Abdominal: Yes: Within Normal Limits, Normal Bowel Sounds, Non Tender, Flat, 

Soft


Genitourinary: Yes: Within Normal Limits


Back: Yes: Muscle Spasm


Musculoskeletal: Yes: Back pain, Muscle Pain


Extremities: Yes: Tremors, Amputation (transmetatarsal amputationleft foot)


Neurological: Yes: CNs II-XII NML intact, Alert, Motor Strength 5/5


Integumentary: Yes: Dry


Lymphatic: Yes: Within Normal Limits





- Diagnostic


(1) Alcohol dependence with uncomplicated withdrawal


Current Visit: No   Status: Acute   





(2) Cannabis abuse


Current Visit: No   Status: Chronic   





(3) Cocaine dependence


Current Visit: Yes   Status: Acute   





(4) Status post transmetatarsal amputation of left foot


Current Visit: No   Status: Chronic   Comment: healed   





Cleared for Admission [facility]





- Detox or Rehab


[facility] Level of Care: Medically Managed


Detox Regimen/Protocol: Librium





Breathalyzer





- Breathalyzer


Breathalyzer: 0.036





Urine Drug Screen





- Test Device


Lot number: [id number]


Expiration date: 21





- Control


Is test valid?: Yes





- Results


Drug screen NEGATIVE: Yes


Urine drug screen results: THC-Marijuana, JULIAN-Cocaine, BZO-Benzodiazepines





Inpatient Rehab Admission





- Rehab Decision to Admit


Inpatient rehab admission?: No

## 2019-09-30 LAB
ALBUMIN SERPL-MCNC: 3.9 G/DL (ref 3.4–5)
ALP SERPL-CCNC: 79 U/L (ref 45–117)
ALT SERPL-CCNC: 31 U/L (ref 13–61)
ANION GAP SERPL CALC-SCNC: 8 MMOL/L (ref 8–16)
AST SERPL-CCNC: 24 U/L (ref 15–37)
BILIRUB SERPL-MCNC: 0.6 MG/DL (ref 0.2–1)
BUN SERPL-MCNC: 17.2 MG/DL (ref 7–18)
CALCIUM SERPL-MCNC: 10 MG/DL (ref 8.5–10.1)
CHLORIDE SERPL-SCNC: 99 MMOL/L (ref 98–107)
CO2 SERPL-SCNC: 31 MMOL/L (ref 21–32)
CREAT SERPL-MCNC: 1.1 MG/DL (ref 0.55–1.3)
DEPRECATED RDW RBC AUTO: 14.3 % (ref 11.9–15.9)
GLUCOSE SERPL-MCNC: 97 MG/DL (ref 74–106)
HCT VFR BLD CALC: 41.5 % (ref 35.4–49)
HGB BLD-MCNC: 14.1 GM/DL (ref 11.7–16.9)
MCH RBC QN AUTO: 29.7 PG (ref 25.7–33.7)
MCHC RBC AUTO-ENTMCNC: 34 G/DL (ref 32–35.9)
MCV RBC: 87.4 FL (ref 80–96)
PLATELET # BLD AUTO: 222 K/MM3 (ref 134–434)
PMV BLD: 8 FL (ref 7.5–11.1)
POTASSIUM SERPLBLD-SCNC: 3.7 MMOL/L (ref 3.5–5.1)
PROT SERPL-MCNC: 8.3 G/DL (ref 6.4–8.2)
RBC # BLD AUTO: 4.75 M/MM3 (ref 4–5.6)
SODIUM SERPL-SCNC: 139 MMOL/L (ref 136–145)
WBC # BLD AUTO: 4.3 K/MM3 (ref 4–10)

## 2019-09-30 RX ADMIN — Medication PRN MG: at 22:10

## 2019-09-30 RX ADMIN — Medication SCH TAB: at 10:06

## 2019-09-30 RX ADMIN — Medication SCH MG: at 22:09

## 2019-09-30 NOTE — PN
S CIWA





- CIWA Score


Nausea/Vomitin-Mild Nausea/No Vomiting


Muscle Tremors: 3


Anxiety: 2


Agitation: 4-Moderately Restless


Paroxysmal Sweats: 2


Orientation: 0-Oriented


Tacttile Disturbances: 0-None


Auditory Disturbances: 1-Very Mild


Visual Disturbances: 0-None


Headache: 1-Very Mild


CIWA-Ar Total Score: 14





[facility] Progress Note (SOAP)


Subjective: 





doing well with librium detox regimen


ate breakfast


ambulating on hallway 





Objective: 





19 09:32


 Vital Signs











Temperature  96.9 F L  19 09:05


 


Pulse Rate  92 H  19 09:05


 


Respiratory Rate  18   19 09:05


 


Blood Pressure  123/77   19 09:05


 


O2 Sat by Pulse Oximetry (%)      








lab pending


Assessment: 





19 09:33


alcohol withdrawal sx


Plan: 





continue librium detox regimen

## 2019-10-01 RX ADMIN — Medication SCH TAB: at 10:03

## 2019-10-01 RX ADMIN — Medication SCH MG: at 22:03

## 2019-10-01 RX ADMIN — Medication PRN MG: at 22:03

## 2019-10-01 NOTE — PN
S CIWA





- CIWA Score


Nausea/Vomitin-Mild Nausea/No Vomiting


Muscle Tremors: 1-None Visible, but Felt


Anxiety: 2


Agitation: 2


Paroxysmal Sweats: No Perspiration


Orientation: 0-Oriented


Tacttile Disturbances: 1-Very Mild Itch/Numbness


Auditory Disturbances: 0-None


Visual Disturbances: 0-None


Headache: 1-Very Mild


CIWA-Ar Total Score: 8





[facility] Progress Note (SOAP)


Subjective: 





alert,irritable,anxious,interrupted sleep,tremor


Objective: 





10/01/19 13:10


 Vital Signs











Temperature  97.6 F   10/01/19 09:02


 


Pulse Rate  110 H  10/01/19 09:02


 


Respiratory Rate  18   10/01/19 09:02


 


Blood Pressure  125/78   10/01/19 09:02


 


O2 Sat by Pulse Oximetry (%)      








 Laboratory Last Values











WBC  4.3 K/mm3 (4.0-10.0)   19  08:50    


 


RBC  4.75 M/mm3 (4.00-5.60)   19  08:50    


 


Hgb  14.1 GM/dL (11.7-16.9)   19  08:50    


 


Hct  41.5 % (35.4-49)   19  08:50    


 


MCV  87.4 fl (80-96)   19  08:50    


 


MCH  29.7 pg (25.7-33.7)   19  08:50    


 


MCHC  34.0 g/dl (32.0-35.9)   19  08:50    


 


RDW  14.3 % (11.9-15.9)   19  08:50    


 


Plt Count  222 K/MM3 (134-434)   19  08:50    


 


MPV  8.0 fl (7.5-11.1)   19  08:50    


 


Sodium  139 mmol/L (136-145)   19  08:50    


 


Potassium  3.7 mmol/L (3.5-5.1)   19  08:50    


 


Chloride  99 mmol/L ()   19  08:50    


 


Carbon Dioxide  31 mmol/L (21-32)   19  08:50    


 


Anion Gap  8 MMOL/L (8-16)   19  08:50    


 


BUN  17.2 mg/dL (7-18)   19  08:50    


 


Creatinine  1.1 mg/dL (0.55-1.3)   19  08:50    


 


Est GFR (CKD-EPI)AfAm  92.16   19  08:50    


 


Est GFR (CKD-EPI)NonAf  79.52   19  08:50    


 


Random Glucose  97 mg/dL ()   19  08:50    


 


Calcium  10.0 mg/dL (8.5-10.1)   19  08:50    


 


Total Bilirubin  0.6 mg/dL (0.2-1)   19  08:50    


 


AST  24 U/L (15-37)   19  08:50    


 


ALT  31 U/L (13-61)   19  08:50    


 


Alkaline Phosphatase  79 U/L ()   19  08:50    


 


Total Protein  8.3 g/dl (6.4-8.2)  H  19  08:50    


 


Albumin  3.9 g/dl (3.4-5.0)   19  08:50    


 


RPR Titer  Nonreactive  (NONREACTIVE)   19  08:50    


 


HIV 1&2 Antibody Screen  Negative   19  08:50    


 


HIV P24 Antigen  Negative   19  08:50    











Assessment: 





10/01/19 13:11


withdrawal symptom


Plan: 





continue detox librium regimen

## 2019-10-02 VITALS — DIASTOLIC BLOOD PRESSURE: 74 MMHG | HEART RATE: 96 BPM | SYSTOLIC BLOOD PRESSURE: 120 MMHG | TEMPERATURE: 97.7 F

## 2019-10-02 RX ADMIN — Medication SCH TAB: at 10:10

## 2019-10-02 NOTE — DS
[facility] Detox Discharge Summary


Admission Date: 


19





Discharge Date: 10/02/19





- History


Present History: Alcohol Dependence, Cannabis Dependence, Cocaine Dependence


Additional Comments: 





SINCE PATIENT DENIES CURRENT WITHDRAWAL / DETOX SYMPTOMS AND REPORTS THAT HE 

FEELS WELL OVERALL, AT PATIENT'S REQUEST, HE WAS GRANTED AN EARLY DISCHARGE 

FROM DETOX UNIT TODAY SO THAT HE MAY PROCEED ON TO AFTERCARE [facility] ([place], [place]). PATIENT ADVISED TO FOLLOW-UP 

WITH  AFTER DISCHARGE FROM REHAB FOR FURTHER MEDICAL EVALUATION OF 

ABNORMALITY NOTED ON DETOX ADMISSION CXR (SEE CXR REPORT FROM 2019). 

PATIENT VERBALIZED UNDERSTANDING OF RECOMMENDATION. COPY OF RESULT OF DETOX 

ADMISSION CXR REPORT GIVEN TO PATIENT AT TIME OF DISCHARGE FROM DETOX UNIT. 

PATIENT WAS DISCHARGED FORM DETOX UNIT IN STABLE MEDICAL CONDITION.


Pertinent Past History: 





G.E.R.D., Depression, History Of Transmetatarsal amputation of left foot.





- Physical Exam Results


Vital Signs: 


 Vital Signs











Temperature  97.7 F   10/02/19 12:59


 


Pulse Rate  96 H  10/02/19 12:59


 


Respiratory Rate  18   10/02/19 12:59


 


Blood Pressure  120/74   10/02/19 12:59


 


O2 Sat by Pulse Oximetry (%)      











Pertinent Admission Physical Exam Findings: 





WITHDRAWAL SYMPTOMS.





 Laboratory Tests











  19





  08:50 08:50 08:50


 


WBC  4.3  


 


RBC  4.75  


 


Hgb  14.1  


 


Hct  41.5  


 


MCV  87.4  


 


MCH  29.7  


 


MCHC  34.0  


 


RDW  14.3  


 


Plt Count  222  


 


MPV  8.0  


 


Sodium   139 


 


Potassium   3.7 


 


Chloride   99 


 


Carbon Dioxide   31 


 


Anion Gap   8 


 


BUN   17.2 


 


Creatinine   1.1 


 


Est GFR (CKD-EPI)AfAm   92.16 


 


Est GFR (CKD-EPI)NonAf   79.52 


 


Random Glucose   97 


 


Calcium   10.0 


 


Total Bilirubin   0.6 


 


AST   24 


 


ALT   31 


 


Alkaline Phosphatase   79 


 


Total Protein   8.3 H 


 


Albumin   3.9 


 


RPR Titer    Nonreactive


 


HIV 1&2 Antibody Screen   


 


HIV P24 Antigen   














  19





  08:50


 


WBC 


 


RBC 


 


Hgb 


 


Hct 


 


MCV 


 


MCH 


 


MCHC 


 


RDW 


 


Plt Count 


 


MPV 


 


Sodium 


 


Potassium 


 


Chloride 


 


Carbon Dioxide 


 


Anion Gap 


 


BUN 


 


Creatinine 


 


Est GFR (CKD-EPI)AfAm 


 


Est GFR (CKD-EPI)NonAf 


 


Random Glucose 


 


Calcium 


 


Total Bilirubin 


 


AST 


 


ALT 


 


Alkaline Phosphatase 


 


Total Protein 


 


Albumin 


 


RPR Titer 


 


HIV 1&2 Antibody Screen  Negative


 


HIV P24 Antigen  Negative








LABS NOTED.





- Treatment


Hospital Course: Detox Protocol Followed, Detoxed Safely, Responded well, 

Discharged Condition Good, Rehab Referral Accepted


Patient has Accepted a Rehab Referral to: [facility] 

[facility] ([place], [place]).





- Medication


Discharge Medications: 


Ambulatory Orders





NK [No Known Home Medication]  10/18/17 











- Diagnosis


(1) Cocaine dependence


Current Visit: Yes   Status: Acute   


Qualifiers: 


   Substance use status: in withdrawal   Qualified Code(s): F14.23 - Cocaine 

dependence with withdrawal   





(2) Alcohol dependence with uncomplicated withdrawal


Current Visit: Yes   Status: Acute   





(3) Cannabis abuse


Current Visit: No   Status: Chronic   





(4) Status post transmetatarsal amputation of left foot


Current Visit: No   Status: Chronic   





- AMA


Did Patient Leave Against Medical Advice: No





[facility] CIWA





- CIWA Score


Nausea/Vomitin-No Nausea/No Vomiting


Muscle Tremors: None


Anxiety: 1-Mildly Anxious


Agitation: 1-Slight > Activity


Paroxysmal Sweats: No Perspiration


Orientation: 0-Oriented


Tacttile Disturbances: 0-None


Auditory Disturbances: 0-None


Visual Disturbances: 0-None


Headache: 0-None Present


CIWA-Ar Total Score: 2

## 2019-12-09 ENCOUNTER — HOSPITAL ENCOUNTER (INPATIENT)
Dept: HOSPITAL 74 - YASAS | Age: 49
LOS: 3 days | Discharge: HOME | End: 2019-12-12
Attending: ALLERGY & IMMUNOLOGY | Admitting: ALLERGY & IMMUNOLOGY
Payer: COMMERCIAL

## 2019-12-09 VITALS — BODY MASS INDEX: 26.6 KG/M2

## 2019-12-09 DIAGNOSIS — F10.230: Primary | ICD-10-CM

## 2019-12-09 DIAGNOSIS — R76.11: ICD-10-CM

## 2019-12-09 DIAGNOSIS — F32.9: ICD-10-CM

## 2019-12-09 DIAGNOSIS — F14.10: ICD-10-CM

## 2019-12-09 DIAGNOSIS — I10: ICD-10-CM

## 2019-12-09 DIAGNOSIS — K21.9: ICD-10-CM

## 2019-12-09 DIAGNOSIS — F17.210: ICD-10-CM

## 2019-12-09 DIAGNOSIS — Z89.432: ICD-10-CM

## 2019-12-09 DIAGNOSIS — F19.24: ICD-10-CM

## 2019-12-09 DIAGNOSIS — F12.10: ICD-10-CM

## 2019-12-09 PROCEDURE — HZ2ZZZZ DETOXIFICATION SERVICES FOR SUBSTANCE ABUSE TREATMENT: ICD-10-PCS | Performed by: ALLERGY & IMMUNOLOGY

## 2019-12-09 RX ADMIN — Medication PRN MG: at 22:02

## 2019-12-09 RX ADMIN — Medication SCH MG: at 22:01

## 2019-12-09 NOTE — HP
CIWA Score


Nausea/Vomiting: 3


Muscle Tremors: 4-Moderate,w/Arms Extend


Anxiety: 4-Mod. Anxious/Guarded


Agitation: 4-Moderately Restless


Paroxysmal Sweats: No Perspiration


Orientation: 0-Oriented


Tacttile Disturbances: 0-None


Auditory Disturbances: 0-None


Visual Disturbances: 2-Mild Sensitivity


Headache: 3-Moderate


CIWA-Ar Total Score: 20





- Admission Criteria


OASAS Guidelines: Admission for Medically Managed Detox: 


Requires at least one of the followin. CIWA greater than 12


2. Seizures within the past 24 hours


3. Delirium tremens within the past 24 hours


4. Hallucinations within the past 24 hours


5. Acute intervention needed for co  occurring medical disorder


6. Acute intervention needed for co  occurring psychiatric disorder


7. Severe withdrawal that cannot be handled at a lower level of care (continued


    vomiting, continued diarrhea, abnormal vital signs) requiring intravenous


    medication and/or fluids


8. Pregnancy








Admitting History and Physical





- Smoking History


Smoking history: Never smoked


Have you smoked in the past 12 months: No


Aproximately how many cigarettes per day: 0


If you are a former smoker, when did you quit?: more than year ago





- Alcohol/Substance Use


Hx Alcohol Use: Yes





Admission [facility]





- [facility]


Allergies/Adverse Reactions: 


 Allergies











Allergy/AdvReac Type Severity Reaction Status Date / Time


 


No Known Allergies Allergy   Verified 19 17:41











History of Present Illness: 





pt here requesting detox from [facility] reports 1-2  pints and  2-3 x  6 pk relapsed 

since , latest use this  morning  , reprts  tremors if not drinking 

, denies  seizures, +  blackouts  . Multiple admissions at this facility  . 


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 


works  in construction 


Exam Limitations: Clinical Condition





- Ebola screening


Have you traveled outside of the country in the last 21 days: No


Have you had contact with anyone from an Ebola affected area: No


Do you have a fever: No





- Review of Systems


Constitutional: Loss of Appetite


EENT: reports: Nose Congestion


Respiratory: reports: Shortness of Breath (reports since nasal  congestion)


Cardiac: reports: No Symptoms Reported


GI: reports: Nausea, Poor Appetite


: reports: No Symptoms Reported


Musculoskeletal: reports: No Symptoms Reported


Integumentary: reports: No Symptoms Reported


Neuro: reports: Headache


Endocrine: reports: No Symptoms Reported


Psychiatric: reports: Orientated x3, Agitated, Anxious





Patient History





- Patient Medical History


Hx Anemia: No


Hx Asthma: No


Hx Chronic Obstructive Pulmonary Disease (COPD): No


Hx Cancer: No


Hx Cardiac Disorders: No


Hx Congestive Heart Failure: No


Hx Hypertension: No


Hx Hypercholesterolemia: No


Hx Pacemaker: No


HX Cerebrovascular Accident: No


Hx Seizures: No


Hx Dementia: No


Hx Diabetes: No


Hx Gastrointestinal Disorders: Yes (GERD)


Hx Liver Disease: No


Hx Genitourinary Disorders: No


Hx Sexually Transmitted Disorders: No


Hx Renal Disease (ESRD): No


Hx Thyroid Disease: No


Hx Human Immunodeficiency Virus (HIV): No (last  negative)


Hx Hepatitis C: No


Hx Depression: Yes (never hospitalized - no meds)


Hx Suicide Attempt: No


Hx Bipolar Disorder: No


Hx Schizophrenia: No





- Patient Surgical History


Past Surgical History: Yes


Hx Neurologic Surgery: No


Hx Cataract Extraction: No


Hx Cardiac Surgery: No


Hx Lung Surgery: No


Hx Breast Surgery: No


Hx Breast Biopsy: No


Hx Abdominal Surgery: No


Hx Appendectomy: No


Hx Cholecystectomy: No


Hx Genitourinary Surgery: No


Hx  Section: No


Hx Orthopedic Surgery: Yes (traumatic transmetatarsal amputation of left foot 

in  in [facility])


Hx Hysterectomy: No


Other Surgical History: h/o nasal and rt rib fx's in past  


Anesthesia Reaction: No





- PPD History


Date: 18


Results: chest negative





- Smoking Cessation


Smoking history: Never smoked


Have you smoked in the past 12 months: No


Aproximately how many cigarettes per day: 0


If you are a former smoker, when did you quit?: more than year ago


Cigars Per Day: 0


Hx Chewing Tobacco Use: No





- Substances abused


  ** Alcohol


Substance route: Oral


Frequency: Daily


Amount used: 24 BEERS 12-16oz, 1 PINT OF TEQUILA


Age of first use: 15


Date of last use: 19





  ** Cocaine


Substance route: Inhalation


Frequency: 1-3 times last 30 days


Amount used: $40/USE


Age of first use: 20


Date of last use: 19





  ** Marijuana/Hashish


Substance route: Smoking


Frequency: 3-6 times per week


Amount used: $10/USE


Age of first use: 18


Date of last use: 19





Admission Physical Exam [facility]





- Vital Signs


Vital Signs: 


 Vital Signs - 24 hr











  19





  17:38 17:59


 


Temperature 98.7 F 98.7 F


 


Pulse Rate 98 H 98 H


 


Respiratory 18 18





Rate  


 


Blood Pressure 146/84 146/84














- Physical


General Appearance: Yes: Moderate Distress, Tremorous, Irritable, Anxious


HEENTM: Yes: EOMI, Hearing grossly Normal, Normocephalic, Normal Voice


Respiratory: Yes: Chest Non-Tender, Lungs Clear, Normal Breath Sounds, No 

Respiratory Distress, No Accessory Muscle Use


Neck: Yes: No masses,lesions,Nodules, Trachea in good position


Cardiology: Yes: Regular Rhythm, Regular Rate, S1, S2, Tachycardia


Abdominal: Yes: Non Tender, Soft


Musculoskeletal: Yes: Gait Steady, Other (left TMA)


Extremities: Yes: Normal Range of Motion, Non-Tender, Other (left TMA)


Neurological: Yes: Fully Oriented, Alert, Motor Strength 5/5, Depressed Affect


Integumentary: Yes: Warm





- Diagnostic


(1) Alcohol dependence with uncomplicated withdrawal


Current Visit: Yes   Status: Chronic   





Breathalyzer





- Breathalyzer


Breathalyzer: 0





Urine Drug Screen





- Test Device


Lot number: [id number]


Expiration date: 21





- Control


Is test valid?: Yes





- Results


Drug screen NEGATIVE: No


Urine drug screen results: THC-Marijuana, JULIAN-Cocaine





Inpatient Rehab Admission





- Rehab Decision to Admit


Inpatient rehab admission?: No

## 2019-12-10 LAB
ALBUMIN SERPL-MCNC: 3.5 G/DL (ref 3.4–5)
ALP SERPL-CCNC: 79 U/L (ref 45–117)
ALT SERPL-CCNC: 36 U/L (ref 13–61)
ANION GAP SERPL CALC-SCNC: 9 MMOL/L (ref 8–16)
AST SERPL-CCNC: 35 U/L (ref 15–37)
BILIRUB SERPL-MCNC: 0.8 MG/DL (ref 0.2–1)
BUN SERPL-MCNC: 17.7 MG/DL (ref 7–18)
CALCIUM SERPL-MCNC: 9.7 MG/DL (ref 8.5–10.1)
CHLORIDE SERPL-SCNC: 104 MMOL/L (ref 98–107)
CO2 SERPL-SCNC: 29 MMOL/L (ref 21–32)
CREAT SERPL-MCNC: 1.2 MG/DL (ref 0.55–1.3)
DEPRECATED RDW RBC AUTO: 13.7 % (ref 11.9–15.9)
GLUCOSE SERPL-MCNC: 104 MG/DL (ref 74–106)
HCT VFR BLD CALC: 41.5 % (ref 35.4–49)
HGB BLD-MCNC: 13.7 GM/DL (ref 11.7–16.9)
MCH RBC QN AUTO: 29 PG (ref 25.7–33.7)
MCHC RBC AUTO-ENTMCNC: 33.1 G/DL (ref 32–35.9)
MCV RBC: 87.6 FL (ref 80–96)
PLATELET # BLD AUTO: 234 K/MM3 (ref 134–434)
PMV BLD: 8.2 FL (ref 7.5–11.1)
POTASSIUM SERPLBLD-SCNC: 3.8 MMOL/L (ref 3.5–5.1)
PROT SERPL-MCNC: 7.6 G/DL (ref 6.4–8.2)
RBC # BLD AUTO: 4.74 M/MM3 (ref 4–5.6)
SODIUM SERPL-SCNC: 142 MMOL/L (ref 136–145)
WBC # BLD AUTO: 3.8 K/MM3 (ref 4–10)

## 2019-12-10 RX ADMIN — Medication SCH MG: at 22:06

## 2019-12-10 RX ADMIN — Medication PRN MG: at 22:07

## 2019-12-10 RX ADMIN — Medication SCH TAB: at 10:07

## 2019-12-10 NOTE — PN
[facility] CIWA





- CIWA Score


Nausea/Vomitin-Mild Nausea/No Vomiting


Muscle Tremors: 3


Anxiety: 4-Mod. Anxious/Guarded


Agitation: 2


Paroxysmal Sweats: 2


Orientation: 0-Oriented


Tacttile Disturbances: 1-Very Mild Itch/Numbness


Auditory Disturbances: 1-Very Mild


Visual Disturbances: 0-None


Headache: 1-Very Mild


CIWA-Ar Total Score: 15





[facility] Progress Note (SOAP)


Subjective: 





49 years old male admitted on 19 for alcohol withdrawal sx management


treated with valium detox regimen


ate breakfast ambulating on hallway 


ADL's independent





Objective: 





12/10/19 14:59


 Vital Signs











Temperature  98.2 F   12/10/19 13:14


 


Pulse Rate  87   12/10/19 13:14


 


Respiratory Rate  18   12/10/19 13:14


 


Blood Pressure  131/89   12/10/19 13:14


 


O2 Sat by Pulse Oximetry (%)      








 Laboratory Last Values











WBC  3.8 K/mm3 (4.0-10.0)  L  12/10/19  07:50    


 


RBC  4.74 M/mm3 (4.00-5.60)   12/10/19  07:50    


 


Hgb  13.7 GM/dL (11.7-16.9)   12/10/19  07:50    


 


Hct  41.5 % (35.4-49)   12/10/19  07:50    


 


MCV  87.6 fl (80-96)   12/10/19  07:50    


 


MCH  29.0 pg (25.7-33.7)   12/10/19  07:50    


 


MCHC  33.1 g/dl (32.0-35.9)   12/10/19  07:50    


 


RDW  13.7 % (11.9-15.9)   12/10/19  07:50    


 


Plt Count  234 K/MM3 (134-434)   12/10/19  07:50    


 


MPV  8.2 fl (7.5-11.1)   12/10/19  07:50    


 


Sodium  142 mmol/L (136-145)   12/10/19  07:50    


 


Potassium  3.8 mmol/L (3.5-5.1)   12/10/19  07:50    


 


Chloride  104 mmol/L ()   12/10/19  07:50    


 


Carbon Dioxide  29 mmol/L (21-32)   12/10/19  07:50    


 


Anion Gap  9 MMOL/L (8-16)   12/10/19  07:50    


 


BUN  17.7 mg/dL (7-18)   12/10/19  07:50    


 


Creatinine  1.2 mg/dL (0.55-1.3)   12/10/19  07:50    


 


Est GFR (CKD-EPI)AfAm  82.38   12/10/19  07:50    


 


Est GFR (CKD-EPI)NonAf  71.08   12/10/19  07:50    


 


Random Glucose  104 mg/dL ()   12/10/19  07:50    


 


Calcium  9.7 mg/dL (8.5-10.1)   12/10/19  07:50    


 


Total Bilirubin  0.8 mg/dL (0.2-1)   12/10/19  07:50    


 


AST  35 U/L (15-37)   12/10/19  07:50    


 


ALT  36 U/L (13-61)   12/10/19  07:50    


 


Alkaline Phosphatase  79 U/L ()   12/10/19  07:50    


 


Total Protein  7.6 g/dl (6.4-8.2)   12/10/19  07:50    


 


Albumin  3.5 g/dl (3.4-5.0)   12/10/19  07:50    








lab noted


Assessment: 





12/10/19 15:01


alcohol withdrawal sx


Plan: 





valium regimen

## 2019-12-11 RX ADMIN — Medication PRN MG: at 22:02

## 2019-12-11 RX ADMIN — Medication SCH MG: at 22:02

## 2019-12-11 RX ADMIN — Medication SCH TAB: at 10:03

## 2019-12-11 NOTE — PN
[facility] CIWA





- CIWA Score


Nausea/Vomitin-No Nausea/No Vomiting


Muscle Tremors: 4-Moderate,w/Arms Extend


Anxiety: 3


Agitation: 2


Paroxysmal Sweats: 1-Minimal Palms Moist


Orientation: 0-Oriented


Tacttile Disturbances: 0-None


Auditory Disturbances: 0-None


Visual Disturbances: 0-None


Headache: 0-None Present


CIWA-Ar Total Score: 10





[facility] Progress Note (SOAP)


Subjective: 





49 years old male admitted on 19 for alcohol withdrawal sx management


treated with valium detox regimen


feeling better today ambulating on hallway social with peers in day room


discuss behavior and psychosocial therapies as part of recovery


Objective: 





19 10:35


 Vital Signs











Temperature  97.0 F L  19 09:13


 


Pulse Rate  89   19 09:13


 


Respiratory Rate  18   19 09:13


 


Blood Pressure  148/88   19 09:13


 


O2 Sat by Pulse Oximetry (%)      








 Laboratory Last Values











WBC  3.8 K/mm3 (4.0-10.0)  L  12/10/19  07:50    


 


RBC  4.74 M/mm3 (4.00-5.60)   12/10/19  07:50    


 


Hgb  13.7 GM/dL (11.7-16.9)   12/10/19  07:50    


 


Hct  41.5 % (35.4-49)   12/10/19  07:50    


 


MCV  87.6 fl (80-96)   12/10/19  07:50    


 


MCH  29.0 pg (25.7-33.7)   12/10/19  07:50    


 


MCHC  33.1 g/dl (32.0-35.9)   12/10/19  07:50    


 


RDW  13.7 % (11.9-15.9)   12/10/19  07:50    


 


Plt Count  234 K/MM3 (134-434)   12/10/19  07:50    


 


MPV  8.2 fl (7.5-11.1)   12/10/19  07:50    


 


Sodium  142 mmol/L (136-145)   12/10/19  07:50    


 


Potassium  3.8 mmol/L (3.5-5.1)   12/10/19  07:50    


 


Chloride  104 mmol/L ()   12/10/19  07:50    


 


Carbon Dioxide  29 mmol/L (21-32)   12/10/19  07:50    


 


Anion Gap  9 MMOL/L (8-16)   12/10/19  07:50    


 


BUN  17.7 mg/dL (7-18)   12/10/19  07:50    


 


Creatinine  1.2 mg/dL (0.55-1.3)   12/10/19  07:50    


 


Est GFR (CKD-EPI)AfAm  82.38   12/10/19  07:50    


 


Est GFR (CKD-EPI)NonAf  71.08   12/10/19  07:50    


 


Random Glucose  104 mg/dL ()   12/10/19  07:50    


 


Calcium  9.7 mg/dL (8.5-10.1)   12/10/19  07:50    


 


Total Bilirubin  0.8 mg/dL (0.2-1)   12/10/19  07:50    


 


AST  35 U/L (15-37)   12/10/19  07:50    


 


ALT  36 U/L (13-61)   12/10/19  07:50    


 


Alkaline Phosphatase  79 U/L ()   12/10/19  07:50    


 


Total Protein  7.6 g/dl (6.4-8.2)   12/10/19  07:50    


 


Albumin  3.5 g/dl (3.4-5.0)   12/10/19  07:50    








lab noted


Assessment: 





19 10:35


alcohol withdrawal 


Plan: 





valium regimen

## 2019-12-12 VITALS — SYSTOLIC BLOOD PRESSURE: 130 MMHG | HEART RATE: 71 BPM | DIASTOLIC BLOOD PRESSURE: 82 MMHG | TEMPERATURE: 97.1 F

## 2019-12-12 NOTE — DS
[facility] Detox Discharge Summary


Admission Date: 


19





Discharge Date: 19





- History


Present History: Alcohol Dependence


Additional Comments: 





49 years old male admitted on 19 for alcohol withdrawal sx management


treated with valium detox regimen


patient tolerated well alert oriented x 3 


respiratory clear lung bilaterally on auscultation


skin warm and dry 








- Physical Exam Results


Vital Signs: 


 Vital Signs











Temperature  97.1 F L  19 06:07


 


Pulse Rate  71   19 06:07


 


Respiratory Rate  18   19 06:07


 


Blood Pressure  130/82   19 06:07


 


O2 Sat by Pulse Oximetry (%)      











Pertinent Admission Physical Exam Findings: 





alcohol withdrawal sx


 Laboratory Last Values











WBC  3.8 K/mm3 (4.0-10.0)  L  12/10/19  07:50    


 


RBC  4.74 M/mm3 (4.00-5.60)   12/10/19  07:50    


 


Hgb  13.7 GM/dL (11.7-16.9)   12/10/19  07:50    


 


Hct  41.5 % (35.4-49)   12/10/19  07:50    


 


MCV  87.6 fl (80-96)   12/10/19  07:50    


 


MCH  29.0 pg (25.7-33.7)   12/10/19  07:50    


 


MCHC  33.1 g/dl (32.0-35.9)   12/10/19  07:50    


 


RDW  13.7 % (11.9-15.9)   12/10/19  07:50    


 


Plt Count  234 K/MM3 (134-434)   12/10/19  07:50    


 


MPV  8.2 fl (7.5-11.1)   12/10/19  07:50    


 


Sodium  142 mmol/L (136-145)   12/10/19  07:50    


 


Potassium  3.8 mmol/L (3.5-5.1)   12/10/19  07:50    


 


Chloride  104 mmol/L ()   12/10/19  07:50    


 


Carbon Dioxide  29 mmol/L (21-32)   12/10/19  07:50    


 


Anion Gap  9 MMOL/L (8-16)   12/10/19  07:50    


 


BUN  17.7 mg/dL (7-18)   12/10/19  07:50    


 


Creatinine  1.2 mg/dL (0.55-1.3)   12/10/19  07:50    


 


Est GFR (CKD-EPI)AfAm  82.38   12/10/19  07:50    


 


Est GFR (CKD-EPI)NonAf  71.08   12/10/19  07:50    


 


Random Glucose  104 mg/dL ()   12/10/19  07:50    


 


Calcium  9.7 mg/dL (8.5-10.1)   12/10/19  07:50    


 


Total Bilirubin  0.8 mg/dL (0.2-1)   12/10/19  07:50    


 


AST  35 U/L (15-37)   12/10/19  07:50    


 


ALT  36 U/L (13-61)   12/10/19  07:50    


 


Alkaline Phosphatase  79 U/L ()   12/10/19  07:50    


 


Total Protein  7.6 g/dl (6.4-8.2)   12/10/19  07:50    


 


Albumin  3.5 g/dl (3.4-5.0)   12/10/19  07:50    








lab noted





- Treatment


Hospital Course: Detox Protocol Followed, Detoxed Safely, Responded well, 

Discharged Condition Good, Rehab Referral Accepted


Patient has Accepted a Rehab Referral to: [facility]





- Medication


Discharge Medications: 


Ambulatory Orders





NK [No Known Home Medication]  10/18/17 











- Diagnosis


(1) Hypertension


Status: Chronic   


Qualifiers: 


   Hypertension type: essential hypertension   Qualified Code(s): I10 - 

Essential (primary) hypertension   





(2) Nicotine abuse


Status: Acute   





(3) Alcohol dependence with uncomplicated withdrawal


Status: Acute   





(4) Substance induced mood disorder


Status: Suspected   





(5) History of positive PPD


Status: Resolved   





- AMA


Did Patient Leave Against Medical Advice: No





CIWA Score





- CIWA Score


Nausea/Vomitin-No Nausea/No Vomiting


Muscle Tremors: 2


Anxiety: 2


Agitation: 1-Slight > Activity


Paroxysmal Sweats: 1-Minimal Palms Moist


Orientation: 0-Oriented


Tacttile Disturbances: 0-None


Auditory Disturbances: 0-None


Visual Disturbances: 0-None


Headache: 0-None Present


CIWA-Ar Total Score: 6

## 2020-01-31 ENCOUNTER — HOSPITAL ENCOUNTER (INPATIENT)
Dept: HOSPITAL 74 - YASAS | Age: 50
LOS: 2 days | Discharge: LEFT BEFORE DISCHARGE | DRG: 770 | End: 2020-02-02
Attending: ALLERGY & IMMUNOLOGY | Admitting: ALLERGY & IMMUNOLOGY
Payer: COMMERCIAL

## 2020-01-31 VITALS — BODY MASS INDEX: 25.5 KG/M2

## 2020-01-31 DIAGNOSIS — F10.230: Primary | ICD-10-CM

## 2020-01-31 DIAGNOSIS — F32.9: ICD-10-CM

## 2020-01-31 DIAGNOSIS — R76.11: ICD-10-CM

## 2020-01-31 DIAGNOSIS — E78.5: ICD-10-CM

## 2020-01-31 DIAGNOSIS — K21.9: ICD-10-CM

## 2020-01-31 DIAGNOSIS — F14.20: ICD-10-CM

## 2020-01-31 DIAGNOSIS — Z72.0: ICD-10-CM

## 2020-01-31 DIAGNOSIS — I10: ICD-10-CM

## 2020-01-31 DIAGNOSIS — Z89.432: ICD-10-CM

## 2020-01-31 DIAGNOSIS — F12.20: ICD-10-CM

## 2020-01-31 DIAGNOSIS — F19.24: ICD-10-CM

## 2020-01-31 PROCEDURE — HZ2ZZZZ DETOXIFICATION SERVICES FOR SUBSTANCE ABUSE TREATMENT: ICD-10-PCS | Performed by: ALLERGY & IMMUNOLOGY

## 2020-01-31 RX ADMIN — Medication PRN MG: at 22:56

## 2020-01-31 RX ADMIN — Medication SCH MG: at 22:55
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
## 2020-02-01 LAB
ALBUMIN SERPL-MCNC: 3.9 G/DL (ref 3.4–5)
ALP SERPL-CCNC: 68 U/L (ref 45–117)
ALT SERPL-CCNC: 35 U/L (ref 13–61)
ANION GAP SERPL CALC-SCNC: 7 MMOL/L (ref 8–16)
AST SERPL-CCNC: 17 U/L (ref 15–37)
BILIRUB SERPL-MCNC: 0.5 MG/DL (ref 0.2–1)
BUN SERPL-MCNC: 18.2 MG/DL (ref 7–18)
CALCIUM SERPL-MCNC: 9.8 MG/DL (ref 8.5–10.1)
CHLORIDE SERPL-SCNC: 103 MMOL/L (ref 98–107)
CO2 SERPL-SCNC: 27 MMOL/L (ref 21–32)
CREAT SERPL-MCNC: 1 MG/DL (ref 0.55–1.3)
DEPRECATED RDW RBC AUTO: 13.6 % (ref 11.9–15.9)
GLUCOSE SERPL-MCNC: 97 MG/DL (ref 74–106)
HCT VFR BLD CALC: 41 % (ref 35.4–49)
HGB BLD-MCNC: 13.9 GM/DL (ref 11.7–16.9)
MCH RBC QN AUTO: 29.2 PG (ref 25.7–33.7)
MCHC RBC AUTO-ENTMCNC: 33.8 G/DL (ref 32–35.9)
MCV RBC: 86.4 FL (ref 80–96)
PLATELET # BLD AUTO: 236 K/MM3 (ref 134–434)
PMV BLD: 8.4 FL (ref 7.5–11.1)
POTASSIUM SERPLBLD-SCNC: 3.7 MMOL/L (ref 3.5–5.1)
PROT SERPL-MCNC: 7.8 G/DL (ref 6.4–8.2)
RBC # BLD AUTO: 4.75 M/MM3 (ref 4–5.6)
SODIUM SERPL-SCNC: 137 MMOL/L (ref 136–145)
WBC # BLD AUTO: 3.9 K/MM3 (ref 4–10)

## 2020-02-01 RX ADMIN — Medication SCH TAB: at 10:29

## 2020-02-01 RX ADMIN — Medication SCH MG: at 22:15

## 2020-02-01 RX ADMIN — Medication PRN MG: at 22:15

## 2020-02-01 NOTE — CONSULT
[facility] Psychiatric Consult





- Data


Date of interview: 02/01/20


Admission source: [facility]


Identifying data: Revisit to [facility] and admission to [address] [place] [address] for this 48 y/o 

[place]born male, self-referred for detoxification treatment. MONALISA issues : 

alcohol, crack/cocaine, cannabis, nicotine. Patient is , a father of 

one, domiciled, unemployed and supported by relatives.


Substance Abuse History: Discussed with the patient. Details in current [facility]HS 

report as follows : Smoking history: Never smoked.  Have you smoked in the past 

12 months: No.  Aproximately how many cigarettes per day: 0.  If you are a 

former smoker, when did you quit?: more than year ago.  Cigars Per Day: 0.  Hx 

Chewing Tobacco Use: No.  Initiated information on smoking cessation: No.  - 

Substance & Tx. History.  Hx Alcohol Use: Yes.  Hx Substance Use: No.  

Substance Use Type: Alcohol.  Hx Substance Use Treatment: Yes ([facility]).  - 

Substances abused.  ** Alcohol.  Substance route: Oral.  Frequency: Daily.  

Amount used: Cans of beers 6pks - 12oz.  Age of first use: 14.  Date of last use

: 01/31/20


Medical History: Medical profile is remarkable for dyslipidemia, positive PPD 

and a history of orthopedic surgery (traumatic transmetatarsal amputation of 

left foot in 1991).


Psychiatric History: Patient denies history of psychiatric hospitalizations. Mr Rios states that he does not have issues of mental illness. " I have an 

alcohol problem. Nothing more." He denies to be on psychotropic medications. 

Patient denies history of suicide attempts.


Physical/Sexual Abuse/Trauma History: Patient denies.


Additional Comment: Urine drug screen results: THC-Marijuana, JULIAN-Cocaine. 

Noted.





Mental Status Exam





- Mental Status Exam


Alert and Oriented to: Time, Place, Person


Cognitive Function: Good


Patient Appearance: Well Groomed


Mood: Withdrawn, Hopeful


Affect: Appropriate, Normal Range


Patient Behavior: Fatigued, Cooperative


Speech Pattern: Clear, Appropriate


Voice Loudness: Normal


Thought Process: Intact, Goal Oriented


Thought Disorder: Not Present


Hallucinations: Denies


Suicidal Ideation: Denies


Homicidal Ideation: Denies


Insight/Judgement: Poor


Sleep: Well


Appetite: Good


Gait/Station: Normal





Psychiatric Findings





- Problem List (Axis 1, 2,3)


(1) Alcohol dependence with uncomplicated withdrawal


Current Visit: Yes   Status: Acute   





(2) Cocaine dependence


Current Visit: Yes   Status: Chronic   


Qualifiers: 


   Substance use status: in withdrawal   Qualified Code(s): F14.23 - Cocaine 

dependence with withdrawal   





(3) Substance induced mood disorder


Current Visit: Yes   Status: Chronic   





- Initial Treatment Plan


Initial Treatment Plan: Psychoeducation. Sleep hygiene. Detoxification. 

Observation.

## 2020-02-01 NOTE — EKG
Test Reason : 

Blood Pressure : ***/*** mmHG

Vent. Rate : 071 BPM     Atrial Rate : 071 BPM

   P-R Int : 156 ms          QRS Dur : 104 ms

    QT Int : 366 ms       P-R-T Axes : 067 069 037 degrees

   QTc Int : 397 ms

 

NORMAL SINUS RHYTHM

NORMAL ECG

WHEN COMPARED WITH ECG OF 05-NOV-2018 22:23,

NO SIGNIFICANT CHANGE WAS FOUND

Confirmed by MD JESUS MOYSES ([id number]) on 2/1/2020 4:32:39 PM

 

Referred By:             Confirmed By:AUDRA JESUS MD

## 2020-02-01 NOTE — PN
S CIWA





- CIWA Score


Nausea/Vomitin-No Nausea/No Vomiting


Muscle Tremors: 4-Moderate,w/Arms Extend


Anxiety: 4-Mod. Anxious/Guarded


Agitation: 2


Paroxysmal Sweats: 1-Minimal Palms Moist


Orientation: 0-Oriented


Tacttile Disturbances: 0-None


Auditory Disturbances: 0-None


Visual Disturbances: 0-None


Headache: 0-None Present


CIWA-Ar Total Score: 11





[facility] Progress Note (SOAP)


Subjective: 





C/O anxiety,tremors


Objective: 





20 16:10


 Vital Signs











  20





  11:30 14:37


 


Temperature 98.4 F 98.5 F


 


Pulse Rate 87 80


 


Respiratory 18 18





Rate  


 


Blood Pressure 124/71 120/80








 Laboratory Tests











  20





  07:30 07:30 07:30


 


WBC  3.9 L  


 


RBC  4.75  


 


Hgb  13.9  


 


Hct  41.0  


 


MCV  86.4  


 


MCH  29.2  


 


MCHC  33.8  


 


RDW  13.6  


 


Plt Count  236  


 


MPV  8.4  


 


Sodium   137 


 


Potassium   3.7 


 


Chloride   103 


 


Carbon Dioxide   27 


 


Anion Gap   7 L 


 


BUN   18.2 H 


 


Creatinine   1.0 


 


Est GFR (CKD-EPI)AfAm   102.69 


 


Est GFR (CKD-EPI)NonAf   88.61 


 


Random Glucose   97 


 


Calcium   9.8 


 


Total Bilirubin   0.5 


 


AST   17 


 


ALT   35 


 


Alkaline Phosphatase   68 


 


Total Protein   7.8 


 


Albumin   3.9 


 


RPR Titer    Nonreactive








Alert o x 3


nad


oob ambulating with steady gait


Assessment: 





20 16:10


MONALISA


w/s


Plan: 





cont detox


maintain safety


increase po fluids

## 2020-02-02 VITALS — TEMPERATURE: 98.1 F | SYSTOLIC BLOOD PRESSURE: 120 MMHG | HEART RATE: 81 BPM | DIASTOLIC BLOOD PRESSURE: 67 MMHG

## 2020-02-02 RX ADMIN — Medication SCH TAB: at 10:25

## 2020-02-02 NOTE — PN
S CIWA





- CIWA Score


Nausea/Vomitin-No Nausea/No Vomiting


Muscle Tremors: None


Anxiety: 3


Agitation: 4-Moderately Restless


Paroxysmal Sweats: No Perspiration


Orientation: 0-Oriented


Tacttile Disturbances: 0-None


Auditory Disturbances: 0-None


Visual Disturbances: 0-None


Headache: 0-None Present


CIWA-Ar Total Score: 7





[facility] Progress Note (SOAP)


Subjective: 





Patient admitted to detox for alcohol withdrawal sx. 





ROS: c/o anxiety, restlessness. States to provider " I do not feel too bad. I 

am thinking of leaving today because I have to work tomorrow. "


Objective: 





20 16:38


 Vital Signs











Temperature  98.1 F   20 14:32


 


Pulse Rate  81   20 14:32


 


Respiratory Rate  16   20 14:32


 


Blood Pressure  120/67   20 14:32


 


O2 Sat by Pulse Oximetry (%)      








 Laboratory Tests











  20





  07:30 07:30 07:30


 


WBC  3.9 L  


 


RBC  4.75  


 


Hgb  13.9  


 


Hct  41.0  


 


MCV  86.4  


 


MCH  29.2  


 


MCHC  33.8  


 


RDW  13.6  


 


Plt Count  236  


 


MPV  8.4  


 


Sodium   137 


 


Potassium   3.7 


 


Chloride   103 


 


Carbon Dioxide   27 


 


Anion Gap   7 L 


 


BUN   18.2 H 


 


Creatinine   1.0 


 


Est GFR (CKD-EPI)AfAm   102.69 


 


Est GFR (CKD-EPI)NonAf   88.61 


 


Random Glucose   97 


 


Calcium   9.8 


 


Total Bilirubin   0.5 


 


AST   17 


 


ALT   35 


 


Alkaline Phosphatase   68 


 


Total Protein   7.8 


 


Albumin   3.9 


 


RPR Titer    Nonreactive








PE:





alert and oriented x 3


skin warm and dry


+perrla, eoms intact bl


car s1s2


resp cta bl


ext no tremors, full rom


anxious, pacing in hallways





Assessment: 





20 16:39


alcohol withdrawal symptoms





Plan: 





continue detox


encourage oral fluids


monitor clinically

## 2020-08-10 ENCOUNTER — HOSPITAL ENCOUNTER (INPATIENT)
Dept: HOSPITAL 74 - YASAS | Age: 50
LOS: 2 days | Discharge: LEFT BEFORE DISCHARGE | DRG: 770 | End: 2020-08-12
Attending: ALLERGY & IMMUNOLOGY | Admitting: ALLERGY & IMMUNOLOGY
Payer: COMMERCIAL

## 2020-08-10 VITALS — BODY MASS INDEX: 28.1 KG/M2

## 2020-08-10 DIAGNOSIS — F10.230: Primary | ICD-10-CM

## 2020-08-10 DIAGNOSIS — Z89.432: ICD-10-CM

## 2020-08-10 DIAGNOSIS — K21.9: ICD-10-CM

## 2020-08-10 DIAGNOSIS — R76.11: ICD-10-CM

## 2020-08-10 DIAGNOSIS — I10: ICD-10-CM

## 2020-08-10 DIAGNOSIS — F10.282: ICD-10-CM

## 2020-08-10 DIAGNOSIS — R00.0: ICD-10-CM

## 2020-08-10 DIAGNOSIS — E78.5: ICD-10-CM

## 2020-08-10 PROCEDURE — U0003 INFECTIOUS AGENT DETECTION BY NUCLEIC ACID (DNA OR RNA); SEVERE ACUTE RESPIRATORY SYNDROME CORONAVIRUS 2 (SARS-COV-2) (CORONAVIRUS DISEASE [COVID-19]), AMPLIFIED PROBE TECHNIQUE, MAKING USE OF HIGH THROUGHPUT TECHNOLOGIES AS DESCRIBED BY CMS-2020-01-R: HCPCS

## 2020-08-11 PROCEDURE — HZ2ZZZZ DETOXIFICATION SERVICES FOR SUBSTANCE ABUSE TREATMENT: ICD-10-PCS | Performed by: ALLERGY & IMMUNOLOGY

## 2020-08-11 RX ADMIN — IBUPROFEN PRN MG: 400 TABLET, FILM COATED ORAL at 06:45

## 2020-08-11 RX ADMIN — IBUPROFEN PRN MG: 400 TABLET, FILM COATED ORAL at 19:31
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
## 2020-08-11 NOTE — EKG
Test Reason : 

Blood Pressure : ***/*** mmHG

Vent. Rate : 089 BPM     Atrial Rate : 089 BPM

   P-R Int : 138 ms          QRS Dur : 092 ms

    QT Int : 348 ms       P-R-T Axes : 064 074 036 degrees

   QTc Int : 423 ms

 

NORMAL SINUS RHYTHM

NORMAL ECG

WHEN COMPARED WITH ECG OF 21-MAY-2020 11:54,

NO SIGNIFICANT CHANGE WAS FOUND

Confirmed by Ryan Rodríguez MD ([id number]) on 8/11/2020 11:30:29 AM

 

Referred By:             Confirmed By:Ryan Rodríguez MD

## 2020-08-11 NOTE — HP
CIWA Score


Nausea/Vomitin


Muscle Tremors: 4-Moderate,w/Arms Extend


Anxiety: 3


Agitation: 4-Moderately Restless


Paroxysmal Sweats: 2


Orientation: 0-Oriented


Tacttile Disturbances: 0-None


Auditory Disturbances: 0-None


Visual Disturbances: 0-None


Headache: 0-None Present


CIWA-Ar Total Score: 15





- Admission Criteria


[facility] Guidelines: Admission for Medically Managed Detox: 


Requires at least one of the followin. CIWA greater than 12


2. Seizures within the past 24 hours


3. Delirium tremens within the past 24 hours


4. Hallucinations within the past 24 hours


5. Acute intervention needed for co  occurring medical disorder


6. Acute intervention needed for co  occurring psychiatric disorder


7. Severe withdrawal that cannot be handled at a lower level of care (continued


    vomiting, continued diarrhea, abnormal vital signs) requiring intravenous


    medication and/or fluids


8. Pregnancy








Admitting History and Physical





- Smoking History


Smoking history: Never smoked


Have you smoked in the past 12 months: No


Aproximately how many cigarettes per day: 0


If you are a former smoker, when did you quit?: more than year ago





- Alcohol/Substance Use


Hx Alcohol Use: Yes





Admission ROS [facility]


Chief Complaint: 





Seeking admission to detox from alcohol


Allergies/Adverse Reactions: 


                                    Allergies











Allergy/AdvReac Type Severity Reaction Status Date / Time


 


No Known Allergies Allergy   Verified 20 11:39











History of Present Illness: 


49 years old male with a long history of alcohol dependence (since age 14 years)

is seeking admission to detox. Patient has been admitted multiple times, last 

for the period 2020 - 2020 and reports 4 years of sobriety. He 

reports that he drinks 3 pints of Vodka, 3 12oz. beer daily and his last drink 

was yesterday. He has medical history of GERD, hypertension, hyperlipidemia and 

denies psych. history and suicidal ideation  at this time. Patient reports + eye

opener, blackouts ( last blackout was 2020) and denies alcohol related 

seizure. 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 

apartment in [place] and denies legal issues. His CIWA score is 15 and his LUCINDA 

is .212.





Exam Limitations: Intoxication





- Ebola screening


Have you traveled outside of the country in the last 21 days: No


Have you had contact with anyone from an Ebola affected area: No


Have you been sick,other than usual withdrawal symptoms: No


Do you have a fever: No





- Review of Systems


Constitutional: Chills, Loss of Appetite, Night Sweats, Changes in sleep


EENT: reports: No Symptoms Reported


Respiratory: reports: No Symptoms reported


Cardiac: reports: No Symptoms Reported


GI: reports: Poor Appetite, Poor Fluid Intake, Abdominal cramping


: reports: No Symptoms Reported


Musculoskeletal: reports: Back Pain


Integumentary: reports: Dryness, Flushing


Neuro: reports: Headache, Tremors


Endocrine: reports: No Symptoms Reported


Hematology: reports: No Symptoms Reported


Psychiatric: reports: Mood/Affect Appropiate, Orientated x3, Anxious


Other Systems: Reviewed and Negative





Patient History





- Patient Medical History


Hx Anemia: No


Hx Asthma: No


Hx Chronic Obstructive Pulmonary Disease (COPD): No


Hx Cancer: No


Hx Cardiac Disorders: No


Hx Congestive Heart Failure: No


Hx Hypertension: Yes (Atenolol)


Hx Hypercholesterolemia: Yes (Not on medication)


Hx Pacemaker: No


HX Cerebrovascular Accident: No


Hx Seizures: No


Hx Dementia: No


Hx Diabetes: No


Hx Gastrointestinal Disorders: Yes (GERD- Not on medication)


Hx Liver Disease: No


Hx Genitourinary Disorders: No


Hx Sexually Transmitted Disorders: No


Hx Renal Disease (ESRD): No


Hx Thyroid Disease: No


Hx Human Immunodeficiency Virus (HIV): No (last  negative)


Hx Hepatitis C: No


Hx Depression: Yes (Not on medication)


Hx Suicide Attempt: No


Hx Bipolar Disorder: No


Hx Schizophrenia: No





- Patient Surgical History


Past Surgical History: Yes


Hx Neurologic Surgery: No


Hx Cataract Extraction: No


Hx Cardiac Surgery: No


Hx Lung Surgery: No


Hx Breast Surgery: No


Hx Breast Biopsy: No


Hx Abdominal Surgery: No


Hx Appendectomy: No


Hx Cholecystectomy: No


Hx Genitourinary Surgery: No


Hx  Section: No


Hx Orthopedic Surgery: Yes (traumatic transmetatarsal amputation of left foot in

 in [facility])


Hx Hysterectomy: No


Other Surgical History: h/o nasal and right rib fx's in past  


Anesthesia Reaction: No





- PPD History


Previous Implant?: No (PPD POSITIVE)


Implanted On Prior R Admission?: No


Date: 18


Results: chest negative


PPD to be Administered?: No





- Reproductive History


Patient is a Female of Child Bearing Age (11 -55 yrs old): No (Male)





- Smoking Cessation


Smoking history: Never smoked


Have you smoked in the past 12 months: No


Aproximately how many cigarettes per day: 0


If you are a former smoker, when did you quit?: more than year ago


Cigars Per Day: 0


Hx Chewing Tobacco Use: No


Initiated information on smoking cessation: No





- Substance & Tx. History


Hx Alcohol Use: Yes


Hx Substance Use: No


Substance Use Type: Alcohol


Hx Substance Use Treatment: Yes ([facility])





- Substances abused


  ** Alcohol


Frequency: Daily


Amount used:  3 pints of Vodka and 3 12 oz. beer


Age of first use: 14


Date of last use: 08/10/20





Admission Physical Exam [facility]





- Vital Signs


Vital Signs: 


                               Vital Signs - 24 hr











  08/10/20





  23:56


 


Temperature 97.7 F


 


Pulse Rate 102 H


 


Respiratory 18





Rate 


 


Blood Pressure 123/75














- Physical


General Appearance: Yes: Moderate Distress, Tremorous, Irritable, Anxious


HEENTM: Yes: Within Normal Limits


Respiratory: Yes: Lungs Clear, Normal Breath Sounds, No Respiratory Distress


Neck: Yes: Within Normal Limits


Breast: Yes: Breast Exam Deferred


Cardiology: Yes: Tachycardia


Abdominal: Yes: Normal Bowel Sounds, Soft


Genitourinary: Yes: Within Normal Limits


Back: Yes: Normal Inspection


Musculoskeletal: Yes: Back pain, Muscle Pain


Extremities: Yes: Tremors


Neurological: Yes: Within Normal Limits


Integumentary: Yes: Warm


Lymphatic: Yes: Within Normal Limits





- Diagnostic


(1) Alcohol dependence with uncomplicated withdrawal


Current Visit: Yes   Status: Acute   





(2) GERD (gastroesophageal reflux disease)


Current Visit: Yes   Status: Chronic   


Qualifiers: 


   Esophagitis presence: esophagitis presence not specified   Qualified Code(s):

K21.9 - Gastro-esophageal reflux disease without esophagitis   





(3) HLD (hyperlipidemia)


Current Visit: Yes   Status: Chronic   


Qualifiers: 


   Hyperlipidemia type: unspecified   Qualified Code(s): E78.5 - Hyperlipidemia,

unspecified   





(4) Hypertension


Current Visit: No   Status: Chronic   


Qualifiers: 


   Hypertension type: essential hypertension   Qualified Code(s): I10 - 

Essential (primary) hypertension   





(5) depression


Current Visit: Yes   Status: Chronic   





(6) History of positive PPD


Current Visit: Yes   Status: Chronic   Comment: 18 cxr normal   





Cleared for Admission [facility]





- Detox or Rehab


[facility] Level of Care: Medically Managed


Detox Regimen/Protocol: Librium


Claeared for Rehab Admission: No





Breathalyzer





- Breathalyzer


Breathalyzer: 0.212





Urine Drug Screen





- Test Device


Lot number: [id number]


Expiration date: 22





- Control


Is test valid?: Yes





- Results


Drug screen NEGATIVE: Yes


Urine drug screen results: JULIAN-Cocaine, BZO-Benzodiazepines





Inpatient Rehab Admission





- Rehab Decision to Admit


Inpatient rehab admission?: No

## 2020-08-11 NOTE — PN
S CIWA





- CIWA Score


Nausea/Vomitin


Muscle Tremors: 2


Anxiety: 2


Agitation: 2


Paroxysmal Sweats: 1-Minimal Palms Moist


Orientation: 0-Oriented


Tacttile Disturbances: 1-Very Mild Itch/Numbness


Auditory Disturbances: 0-None


Visual Disturbances: 0-None


Headache: 2-Mild


CIWA-Ar Total Score: 12





[facility] Progress Note (SOAP)


Subjective: 





alert,irritable,anxious,interrupted sleep,tremor,aching pain in the body and 

back


Objective: 





20 10:52


                                   Vital Signs











Temperature  97.3 F L  20 08:31


 


Pulse Rate  97 H  20 08:31


 


Respiratory Rate  17   20 08:31


 


Blood Pressure  123/63   20 08:31


 


O2 Sat by Pulse Oximetry (%)  96   20 08:31











20 10:53


labs pending


Assessment: 





20 10:54


withdrawal symptom


Plan: 





continue detox librium regimen

## 2020-08-12 VITALS — HEART RATE: 88 BPM | SYSTOLIC BLOOD PRESSURE: 128 MMHG | DIASTOLIC BLOOD PRESSURE: 72 MMHG

## 2020-08-12 VITALS — TEMPERATURE: 97.4 F

## 2020-08-12 LAB
ALBUMIN SERPL-MCNC: 3.7 G/DL (ref 3.4–5)
ALP SERPL-CCNC: 72 U/L (ref 45–117)
ALT SERPL-CCNC: 36 U/L (ref 13–61)
ANION GAP SERPL CALC-SCNC: 6 MMOL/L (ref 8–16)
AST SERPL-CCNC: 23 U/L (ref 15–37)
BILIRUB SERPL-MCNC: 0.8 MG/DL (ref 0.2–1)
BUN SERPL-MCNC: 22.8 MG/DL (ref 7–18)
CALCIUM SERPL-MCNC: 9.8 MG/DL (ref 8.5–10.1)
CHLORIDE SERPL-SCNC: 101 MMOL/L (ref 98–107)
CO2 SERPL-SCNC: 30 MMOL/L (ref 21–32)
CREAT SERPL-MCNC: 1 MG/DL (ref 0.55–1.3)
DEPRECATED RDW RBC AUTO: 13.9 % (ref 11.9–15.9)
GLUCOSE SERPL-MCNC: 99 MG/DL (ref 74–106)
HCT VFR BLD CALC: 39.2 % (ref 35.4–49)
HGB BLD-MCNC: 13.2 GM/DL (ref 11.7–16.9)
MCH RBC QN AUTO: 29 PG (ref 25.7–33.7)
MCHC RBC AUTO-ENTMCNC: 33.7 G/DL (ref 32–35.9)
MCV RBC: 86.2 FL (ref 80–96)
PLATELET # BLD AUTO: 236 K/MM3 (ref 134–434)
PMV BLD: 8.1 FL (ref 7.5–11.1)
POTASSIUM SERPLBLD-SCNC: 3.9 MMOL/L (ref 3.5–5.1)
PROT SERPL-MCNC: 7.8 G/DL (ref 6.4–8.2)
RBC # BLD AUTO: 4.54 M/MM3 (ref 4–5.6)
SODIUM SERPL-SCNC: 138 MMOL/L (ref 136–145)
WBC # BLD AUTO: 4.2 K/MM3 (ref 4–10)

## 2020-08-12 RX ADMIN — IBUPROFEN PRN MG: 400 TABLET, FILM COATED ORAL at 09:22

## 2020-08-12 NOTE — DS
[facility] Detox Discharge Summary


Admission Date: 


08/11/20





Discharge Date: 08/12/20





- History


Additional Comments: 





alert,oriented x 3


ambulation on the unit


lung clear on auscultation bilaterally


abdomen soft,no distension,no pain 


patient does not want to complete treatment,high risks of relapsing 

explained,understood,


singed release ama


seen by counselor


patient will follow up with out patient program,aa meeting as arrangement by 

counselor


Pertinent Past History: 





alcohol related seizure


history of traumatic transmetatarsal amputation left foot


hyperlipidemia





- Physical Exam Results


Vital Signs: 


                                   Vital Signs











Temperature  97.4 F L  08/12/20 06:27


 


Pulse Rate  74   08/12/20 06:27


 


Respiratory Rate  18   08/12/20 06:27


 


Blood Pressure  138/73   08/12/20 06:27


 


O2 Sat by Pulse Oximetry (%)  96   08/12/20 06:27











Pertinent Admission Physical Exam Findings: 





withdrawal signs and symptom


                                   Vital Signs











Temperature  97.4 F L  08/12/20 06:27


 


Pulse Rate  74   08/12/20 06:27


 


Respiratory Rate  18   08/12/20 06:27


 


Blood Pressure  138/73   08/12/20 06:27


 


O2 Sat by Pulse Oximetry (%)  96   08/12/20 06:27








                             Laboratory Last Values











COVID-19 ([facility])  Not detected  (Not Detected)   08/11/20  10:20    








labs pending





- Medication


Discharge Medications: 


Ambulatory Orders





NK [No Known Home Medication]  10/18/17 











- Diagnosis


(1) Alcohol dependence with uncomplicated withdrawal


Current Visit: Yes   Status: Acute   





(2) GERD (gastroesophageal reflux disease)


Current Visit: Yes   Status: Chronic   


Qualifiers: 


   Esophagitis presence: esophagitis presence not specified   Qualified Code(s):

K21.9 - Gastro-esophageal reflux disease without esophagitis   





(3) HLD (hyperlipidemia)


Current Visit: Yes   Status: Chronic   


Qualifiers: 


   Hyperlipidemia type: unspecified   Qualified Code(s): E78.5 - Hyperlipidemia,

unspecified   





(4) History of positive PPD


Current Visit: Yes   Status: Chronic   





(5) Nicotine abuse


Current Visit: No   Status: Chronic   





(6) Status post transmetatarsal amputation of left foot


Current Visit: No   Status: Resolved   





- AMA


Did Patient Leave Against Medical Advice: Yes

## 2020-08-12 NOTE — PN
[facility] CIWA





- CIWA Score


Nausea/Vomitin


Muscle Tremors: 2


Anxiety: 2


Agitation: 2


Paroxysmal Sweats: No Perspiration


Orientation: 0-Oriented


Tacttile Disturbances: 1-Very Mild Itch/Numbness


Auditory Disturbances: 0-None


Visual Disturbances: 0-None


Headache: 1-Very Mild


CIWA-Ar Total Score: 10





[facility] Progress Note (SOAP)


Subjective: 





alert,irritable,anxious,interrupted sleep,pain in the body and back


Objective: 





20 09:33


                                   Vital Signs











Temperature  97.4 F L  20 06:27


 


Pulse Rate  74   20 06:27


 


Respiratory Rate  18   20 06:27


 


Blood Pressure  138/73   20 06:27


 


O2 Sat by Pulse Oximetry (%)  96   20 06:27











20 09:34


                             Laboratory Last Values











COVID-19 ([facility])  Not detected  (Not Detected)   20  10:20    











20 09:34


labs pending


Assessment: 





20 09:34


withdrawal symptom


Plan: 





continue detox librium regimen

## 2020-08-12 NOTE — PN
[facility] Progress Note


Note: 





patient did not want to complete treatment,high risk of relapsing 

explained,patient understood,


signed release ama

## 2021-01-07 ENCOUNTER — HOSPITAL ENCOUNTER (INPATIENT)
Dept: HOSPITAL 74 - YASAS | Age: 51
LOS: 4 days | Discharge: OTHER INSTITUTION | End: 2021-01-11
Attending: ALLERGY & IMMUNOLOGY | Admitting: ALLERGY & IMMUNOLOGY
Payer: COMMERCIAL

## 2021-01-07 VITALS — BODY MASS INDEX: 28.3 KG/M2

## 2021-01-07 DIAGNOSIS — E78.00: ICD-10-CM

## 2021-01-07 DIAGNOSIS — K21.9: ICD-10-CM

## 2021-01-07 DIAGNOSIS — F10.282: ICD-10-CM

## 2021-01-07 DIAGNOSIS — Z89.432: ICD-10-CM

## 2021-01-07 DIAGNOSIS — F17.210: ICD-10-CM

## 2021-01-07 DIAGNOSIS — I10: ICD-10-CM

## 2021-01-07 DIAGNOSIS — Z87.81: ICD-10-CM

## 2021-01-07 DIAGNOSIS — F10.230: Primary | ICD-10-CM

## 2021-01-07 DIAGNOSIS — F19.24: ICD-10-CM

## 2021-01-07 DIAGNOSIS — F32.9: ICD-10-CM

## 2021-01-07 LAB
ALBUMIN SERPL-MCNC: 4.5 G/DL (ref 3.4–5)
ALP SERPL-CCNC: 65 U/L (ref 45–117)
ALT SERPL-CCNC: 31 U/L (ref 13–61)
ANION GAP SERPL CALC-SCNC: 6 MMOL/L (ref 8–16)
AST SERPL-CCNC: 25 U/L (ref 15–37)
BILIRUB SERPL-MCNC: 0.4 MG/DL (ref 0.2–1)
BUN SERPL-MCNC: 13.3 MG/DL (ref 7–18)
CALCIUM SERPL-MCNC: 10.5 MG/DL (ref 8.5–10.1)
CHLORIDE SERPL-SCNC: 100 MMOL/L (ref 98–107)
CO2 SERPL-SCNC: 29 MMOL/L (ref 21–32)
CREAT SERPL-MCNC: 1.2 MG/DL (ref 0.55–1.3)
DEPRECATED RDW RBC AUTO: 14 % (ref 11.9–15.9)
GLUCOSE SERPL-MCNC: 90 MG/DL (ref 74–106)
HCT VFR BLD CALC: 42 % (ref 35.4–49)
HGB BLD-MCNC: 14.1 GM/DL (ref 11.7–16.9)
MCH RBC QN AUTO: 28.8 PG (ref 25.7–33.7)
MCHC RBC AUTO-ENTMCNC: 33.6 G/DL (ref 32–35.9)
MCV RBC: 85.7 FL (ref 80–96)
PLATELET # BLD AUTO: 285 K/MM3 (ref 134–434)
PMV BLD: 8.2 FL (ref 7.5–11.1)
PROT SERPL-MCNC: 9.1 G/DL (ref 6.4–8.2)
RBC # BLD AUTO: 4.9 M/MM3 (ref 4–5.6)
SODIUM SERPL-SCNC: 135 MMOL/L (ref 136–145)
WBC # BLD AUTO: 5.2 K/MM3 (ref 4–10)

## 2021-01-07 PROCEDURE — HZ2ZZZZ DETOXIFICATION SERVICES FOR SUBSTANCE ABUSE TREATMENT: ICD-10-PCS | Performed by: ALLERGY & IMMUNOLOGY

## 2021-01-07 PROCEDURE — C9803 HOPD COVID-19 SPEC COLLECT: HCPCS

## 2021-01-07 PROCEDURE — U0003 INFECTIOUS AGENT DETECTION BY NUCLEIC ACID (DNA OR RNA); SEVERE ACUTE RESPIRATORY SYNDROME CORONAVIRUS 2 (SARS-COV-2) (CORONAVIRUS DISEASE [COVID-19]), AMPLIFIED PROBE TECHNIQUE, MAKING USE OF HIGH THROUGHPUT TECHNOLOGIES AS DESCRIBED BY CMS-2020-01-R: HCPCS

## 2021-01-07 RX ADMIN — METHOCARBAMOL PRN MG: 500 TABLET ORAL at 22:20

## 2021-01-07 RX ADMIN — HYDROXYZINE PAMOATE SCH MG: 25 CAPSULE ORAL at 17:29

## 2021-01-07 RX ADMIN — Medication SCH MG: at 22:17

## 2021-01-07 RX ADMIN — HYDROXYZINE PAMOATE SCH MG: 25 CAPSULE ORAL at 22:17

## 2021-01-08 RX ADMIN — Medication SCH TAB: at 10:02

## 2021-01-08 RX ADMIN — HYDROXYZINE PAMOATE SCH: 25 CAPSULE ORAL at 17:47

## 2021-01-08 RX ADMIN — Medication SCH MG: at 21:57

## 2021-01-08 RX ADMIN — HYDROXYZINE PAMOATE SCH: 25 CAPSULE ORAL at 14:08

## 2021-01-08 RX ADMIN — HYDROXYZINE PAMOATE SCH MG: 25 CAPSULE ORAL at 10:02

## 2021-01-08 RX ADMIN — HYDROXYZINE PAMOATE SCH MG: 25 CAPSULE ORAL at 21:57

## 2021-01-08 RX ADMIN — HYDROXYZINE PAMOATE SCH MG: 25 CAPSULE ORAL at 05:45

## 2021-01-09 LAB
APPEARANCE UR: CLEAR
BILIRUB UR STRIP.AUTO-MCNC: NEGATIVE MG/DL
COLOR UR: YELLOW
KETONES UR QL STRIP: NEGATIVE
LEUKOCYTE ESTERASE UR QL STRIP.AUTO: NEGATIVE
NITRITE UR QL STRIP: NEGATIVE
PH UR: 5.5 [PH] (ref 5–8)
PROT UR QL STRIP: NEGATIVE
PROT UR QL STRIP: NEGATIVE
SP GR UR: 1.02 (ref 1.01–1.03)
UROBILINOGEN UR STRIP-MCNC: 0.2 MG/DL (ref 0.2–1)

## 2021-01-09 RX ADMIN — Medication SCH TAB: at 10:20

## 2021-01-09 RX ADMIN — Medication SCH MG: at 22:17

## 2021-01-09 RX ADMIN — HYDROXYZINE PAMOATE SCH MG: 25 CAPSULE ORAL at 22:16

## 2021-01-09 RX ADMIN — HYDROXYZINE PAMOATE SCH MG: 25 CAPSULE ORAL at 05:35

## 2021-01-09 RX ADMIN — Medication SCH MG: at 22:16

## 2021-01-09 RX ADMIN — HYDROXYZINE PAMOATE SCH: 25 CAPSULE ORAL at 14:24

## 2021-01-09 RX ADMIN — METHOCARBAMOL PRN MG: 500 TABLET ORAL at 10:21

## 2021-01-09 RX ADMIN — HYDROXYZINE PAMOATE SCH MG: 25 CAPSULE ORAL at 17:41

## 2021-01-09 RX ADMIN — HYDROXYZINE PAMOATE SCH: 25 CAPSULE ORAL at 10:20

## 2021-01-10 RX ADMIN — HYDROXYZINE PAMOATE SCH MG: 25 CAPSULE ORAL at 05:48

## 2021-01-10 RX ADMIN — Medication SCH MG: at 22:10

## 2021-01-10 RX ADMIN — HYDROXYZINE PAMOATE SCH: 25 CAPSULE ORAL at 14:20

## 2021-01-10 RX ADMIN — Medication SCH MG: at 22:09

## 2021-01-10 RX ADMIN — HYDROXYZINE PAMOATE SCH MG: 25 CAPSULE ORAL at 17:10

## 2021-01-10 RX ADMIN — HYDROXYZINE PAMOATE SCH MG: 25 CAPSULE ORAL at 10:03

## 2021-01-10 RX ADMIN — HYDROXYZINE PAMOATE SCH MG: 25 CAPSULE ORAL at 22:10

## 2021-01-10 RX ADMIN — Medication SCH TAB: at 10:03

## 2021-01-11 VITALS — HEART RATE: 94 BPM | SYSTOLIC BLOOD PRESSURE: 132 MMHG | TEMPERATURE: 97.5 F | DIASTOLIC BLOOD PRESSURE: 83 MMHG

## 2021-01-11 RX ADMIN — Medication SCH TAB: at 10:10

## 2021-01-11 RX ADMIN — HYDROXYZINE PAMOATE SCH: 25 CAPSULE ORAL at 14:25

## 2021-01-11 RX ADMIN — HYDROXYZINE PAMOATE SCH MG: 25 CAPSULE ORAL at 10:10

## 2021-01-11 RX ADMIN — HYDROXYZINE PAMOATE SCH: 25 CAPSULE ORAL at 06:53

## 2021-03-06 ENCOUNTER — HOSPITAL ENCOUNTER (INPATIENT)
Dept: HOSPITAL 74 - YASAS | Age: 51
LOS: 2 days | Discharge: LEFT BEFORE DISCHARGE | DRG: 770 | End: 2021-03-08
Attending: ALLERGY & IMMUNOLOGY | Admitting: ALLERGY & IMMUNOLOGY
Payer: COMMERCIAL

## 2021-03-06 VITALS — BODY MASS INDEX: 30 KG/M2

## 2021-03-06 DIAGNOSIS — K21.9: ICD-10-CM

## 2021-03-06 DIAGNOSIS — F19.24: ICD-10-CM

## 2021-03-06 DIAGNOSIS — E78.5: ICD-10-CM

## 2021-03-06 DIAGNOSIS — Z89.422: ICD-10-CM

## 2021-03-06 DIAGNOSIS — Z87.891: ICD-10-CM

## 2021-03-06 DIAGNOSIS — Z89.412: ICD-10-CM

## 2021-03-06 DIAGNOSIS — F10.282: ICD-10-CM

## 2021-03-06 DIAGNOSIS — I10: ICD-10-CM

## 2021-03-06 DIAGNOSIS — B18.2: ICD-10-CM

## 2021-03-06 DIAGNOSIS — Z87.81: ICD-10-CM

## 2021-03-06 DIAGNOSIS — R63.8: ICD-10-CM

## 2021-03-06 DIAGNOSIS — Z86.11: ICD-10-CM

## 2021-03-06 DIAGNOSIS — F10.230: Primary | ICD-10-CM

## 2021-03-06 DIAGNOSIS — N17.9: ICD-10-CM

## 2021-03-06 PROCEDURE — C9803 HOPD COVID-19 SPEC COLLECT: HCPCS

## 2021-03-06 PROCEDURE — HZ2ZZZZ DETOXIFICATION SERVICES FOR SUBSTANCE ABUSE TREATMENT: ICD-10-PCS | Performed by: ALLERGY & IMMUNOLOGY

## 2021-03-06 PROCEDURE — U0003 INFECTIOUS AGENT DETECTION BY NUCLEIC ACID (DNA OR RNA); SEVERE ACUTE RESPIRATORY SYNDROME CORONAVIRUS 2 (SARS-COV-2) (CORONAVIRUS DISEASE [COVID-19]), AMPLIFIED PROBE TECHNIQUE, MAKING USE OF HIGH THROUGHPUT TECHNOLOGIES AS DESCRIBED BY CMS-2020-01-R: HCPCS

## 2021-03-06 RX ADMIN — Medication SCH MG: at 22:43

## 2021-03-06 RX ADMIN — METHOCARBAMOL PRN MG: 500 TABLET ORAL at 22:45

## 2021-03-07 LAB
ALBUMIN SERPL-MCNC: 4.2 G/DL (ref 3.4–5)
ALP SERPL-CCNC: 73 U/L (ref 45–117)
ALT SERPL-CCNC: 54 U/L (ref 13–61)
ANION GAP SERPL CALC-SCNC: 5 MMOL/L (ref 8–16)
APPEARANCE UR: CLEAR
AST SERPL-CCNC: 28 U/L (ref 15–37)
BILIRUB SERPL-MCNC: 0.5 MG/DL (ref 0.2–1)
BILIRUB UR STRIP.AUTO-MCNC: NEGATIVE MG/DL
BUN SERPL-MCNC: 21.4 MG/DL (ref 7–18)
CALCIUM SERPL-MCNC: 9.9 MG/DL (ref 8.5–10.1)
CHLORIDE SERPL-SCNC: 101 MMOL/L (ref 98–107)
CO2 SERPL-SCNC: 30 MMOL/L (ref 21–32)
COLOR UR: YELLOW
CREAT SERPL-MCNC: 1.4 MG/DL (ref 0.55–1.3)
DEPRECATED RDW RBC AUTO: 14.4 % (ref 11.9–15.9)
GLUCOSE SERPL-MCNC: 95 MG/DL (ref 74–106)
HCT VFR BLD CALC: 40.5 % (ref 35.4–49)
HGB BLD-MCNC: 14.2 GM/DL (ref 11.7–16.9)
KETONES UR QL STRIP: NEGATIVE
LEUKOCYTE ESTERASE UR QL STRIP.AUTO: NEGATIVE
MCH RBC QN AUTO: 30 PG (ref 25.7–33.7)
MCHC RBC AUTO-ENTMCNC: 35 G/DL (ref 32–35.9)
MCV RBC: 85.8 FL (ref 80–96)
NITRITE UR QL STRIP: NEGATIVE
PH UR: 5 [PH] (ref 5–8)
PLATELET # BLD AUTO: 288 K/MM3 (ref 134–434)
PMV BLD: 8.3 FL (ref 7.5–11.1)
POTASSIUM SERPLBLD-SCNC: 3.5 MMOL/L (ref 3.5–5.1)
PROT SERPL-MCNC: 8.8 G/DL (ref 6.4–8.2)
PROT UR QL STRIP: NEGATIVE
PROT UR QL STRIP: NEGATIVE
RBC # BLD AUTO: 4.72 M/MM3 (ref 4–5.6)
SODIUM SERPL-SCNC: 136 MMOL/L (ref 136–145)
SP GR UR: 1.01 (ref 1.01–1.03)
UROBILINOGEN UR STRIP-MCNC: 0.2 MG/DL (ref 0.2–1)
WBC # BLD AUTO: 6.3 K/MM3 (ref 4–10)

## 2021-03-07 RX ADMIN — Medication SCH MG: at 22:33

## 2021-03-07 RX ADMIN — Medication SCH TAB: at 10:34

## 2021-03-07 RX ADMIN — METHOCARBAMOL PRN MG: 500 TABLET ORAL at 10:36

## 2021-03-08 VITALS — SYSTOLIC BLOOD PRESSURE: 114 MMHG | TEMPERATURE: 97.8 F | HEART RATE: 99 BPM | DIASTOLIC BLOOD PRESSURE: 75 MMHG

## 2021-03-08 LAB
ALBUMIN SERPL-MCNC: 3.7 G/DL (ref 3.4–5)
ALP SERPL-CCNC: 75 U/L (ref 45–117)
ALT SERPL-CCNC: 48 U/L (ref 13–61)
ANION GAP SERPL CALC-SCNC: 6 MMOL/L (ref 8–16)
AST SERPL-CCNC: 21 U/L (ref 15–37)
BILIRUB SERPL-MCNC: 0.4 MG/DL (ref 0.2–1)
BUN SERPL-MCNC: 26.1 MG/DL (ref 7–18)
CALCIUM SERPL-MCNC: 9.6 MG/DL (ref 8.5–10.1)
CHLORIDE SERPL-SCNC: 101 MMOL/L (ref 98–107)
CO2 SERPL-SCNC: 30 MMOL/L (ref 21–32)
CREAT SERPL-MCNC: 1.1 MG/DL (ref 0.55–1.3)
GLUCOSE SERPL-MCNC: 106 MG/DL (ref 74–106)
POTASSIUM SERPLBLD-SCNC: 3.6 MMOL/L (ref 3.5–5.1)
PROT SERPL-MCNC: 8.1 G/DL (ref 6.4–8.2)
SODIUM SERPL-SCNC: 137 MMOL/L (ref 136–145)

## 2021-03-08 RX ADMIN — Medication SCH TAB: at 10:39

## 2021-09-15 ENCOUNTER — EMERGENCY (EMERGENCY)
Facility: HOSPITAL | Age: 51
LOS: 1 days | Discharge: HOME | End: 2021-09-15
Attending: EMERGENCY MEDICINE | Admitting: EMERGENCY MEDICINE
Payer: COMMERCIAL

## 2021-09-15 VITALS
RESPIRATION RATE: 18 BRPM | SYSTOLIC BLOOD PRESSURE: 122 MMHG | TEMPERATURE: 99 F | HEART RATE: 98 BPM | OXYGEN SATURATION: 97 % | DIASTOLIC BLOOD PRESSURE: 86 MMHG

## 2021-09-15 DIAGNOSIS — R42 DIZZINESS AND GIDDINESS: ICD-10-CM

## 2021-09-15 DIAGNOSIS — R51.9 HEADACHE, UNSPECIFIED: ICD-10-CM

## 2021-09-15 PROCEDURE — 93005 ELECTROCARDIOGRAM TRACING: CPT

## 2021-09-15 PROCEDURE — 99284 EMERGENCY DEPT VISIT MOD MDM: CPT

## 2021-09-15 PROCEDURE — 99284 EMERGENCY DEPT VISIT MOD MDM: CPT | Mod: 25

## 2021-09-15 RX ORDER — ACETAMINOPHEN 500 MG
650 TABLET ORAL ONCE
Refills: 0 | Status: COMPLETED | OUTPATIENT
Start: 2021-09-15 | End: 2021-09-15

## 2021-09-15 RX ORDER — MECLIZINE HCL 12.5 MG
25 TABLET ORAL ONCE
Refills: 0 | Status: COMPLETED | OUTPATIENT
Start: 2021-09-15 | End: 2021-09-15

## 2021-09-15 RX ADMIN — Medication 650 MILLIGRAM(S): at 23:15

## 2021-09-15 RX ADMIN — Medication 25 MILLIGRAM(S): at 23:15

## 2021-09-15 NOTE — ED ADULT TRIAGE NOTE - ARRIVAL INFO ADDITIONAL COMMENTS
pt c/o 1 month of intermittent dizziness, was seen at [facility] and told it was probably vertigo,   tonight while walking home he had transient episode of dizziness with headache and called 911

## 2021-09-16 VITALS
RESPIRATION RATE: 18 BRPM | SYSTOLIC BLOOD PRESSURE: 118 MMHG | TEMPERATURE: 98 F | HEART RATE: 78 BPM | DIASTOLIC BLOOD PRESSURE: 72 MMHG | OXYGEN SATURATION: 96 %

## 2021-09-16 NOTE — ED PROVIDER NOTE - NSFOLLOWUPINSTRUCTIONS_ED_ALL_ED_FT
Vertigo    WHAT YOU NEED TO KNOW:    Vertigo is a condition that causes you to feel dizzy. You may feel that you or everything around you is moving or spinning. You may also feel like you are being pulled down or toward your side.     DISCHARGE INSTRUCTIONS:    Seek care immediately if:   •You have a headache and a stiff neck.      •You have shaking chills and a fever.       •You vomit over and over with no relief.       •You have blood, pus, or fluid coming out of your ears.      •You are confused.       Contact your healthcare provider if:   •Your symptoms do not get better with treatment.       •You have questions about your condition or care.      Medicines:   •Medicine may be given to help relieve your symptoms.      •Take your medicine as directed. Contact your healthcare provider if you think your medicine is not helping or if you have side effects. Tell him or her if you are allergic to any medicine. Keep a list of the medicines, vitamins, and herbs you take. Include the amounts, and when and why you take them. Bring the list or the pill bottles to follow-up visits. Carry your medicine list with you in case of an emergency.      Manage your symptoms:   •Do not drive, walk without help, or operate heavy machinery when you are dizzy.       •Move slowly when you move from one position to another position. Get up slowly from sitting or lying down. Sit or lie down right away if you feel dizzy.      •Drink plenty of liquids. Liquids help prevent dehydration. Ask how much liquid to drink each day and which liquids are best for you.      •Vestibular and balance rehabilitation therapy (VBRT) is used to teach you exercises to improve your balance and strength. These exercises may help decrease your vertigo and improve your balance. Ask for more information about this therapy.      Follow up with your doctor as directed: Write down your questions so you remember to ask them during your visits.        © Copyright [employer] 2021

## 2021-09-16 NOTE — ED PROVIDER NOTE - OBJECTIVE STATEMENT
49 y/o M pt with PMHx of vertigo, diagnosed 1 week ago, presents to ED c/o dizziness that "feels like a loss of balance" today. Pt was given Meclizine 1 week ago for his symptoms, but he didn't have it on him. He was [place] from work [place]. Pt had associated mild, dull headache which prompted ED visit. In ED, pt is ao x 3, not in any distress, well appearing, and VS within normal limits.

## 2021-09-16 NOTE — ED PROVIDER NOTE - CLINICAL SUMMARY MEDICAL DECISION MAKING FREE TEXT BOX
49 y/o M pt presents to ED with vertigo. Plan for Tylenol PO, Meclizine PO, and reassess. 49 y/o M pt presents to ED with vertigo. Plan for Tylenol PO, Meclizine PO, and reassess.  On re-evaluation, pt is AAox3 and in NAD. Vitals stable. Pt tolerating po, ambulating at baseline, no focal findings. Instructions given for outpatient follow up, return to ED for worsening condition. Pt expresses understanding.

## 2021-09-16 NOTE — ED PROVIDER NOTE - PATIENT PORTAL LINK FT
You can access the FollowMyHealth Patient Portal offered by [facility] by registering at the following website: [email or web address]. By joining [facility]’s FollowMyHealth portal, you will also be able to view your health information using other applications (apps) compatible with our system.

## 2022-06-27 ENCOUNTER — HOSPITAL ENCOUNTER (INPATIENT)
Dept: HOSPITAL 74 - YASAS | Age: 52
LOS: 4 days | Discharge: OTHER INSTITUTION | End: 2022-07-01
Attending: SURGERY | Admitting: ALLERGY & IMMUNOLOGY
Payer: COMMERCIAL

## 2022-06-27 VITALS — BODY MASS INDEX: 25.8 KG/M2

## 2022-06-27 DIAGNOSIS — F19.282: ICD-10-CM

## 2022-06-27 DIAGNOSIS — Z56.0: ICD-10-CM

## 2022-06-27 DIAGNOSIS — Z86.11: ICD-10-CM

## 2022-06-27 DIAGNOSIS — E87.6: ICD-10-CM

## 2022-06-27 DIAGNOSIS — E78.5: ICD-10-CM

## 2022-06-27 DIAGNOSIS — F10.230: Primary | ICD-10-CM

## 2022-06-27 DIAGNOSIS — D72.819: ICD-10-CM

## 2022-06-27 DIAGNOSIS — Z89.432: ICD-10-CM

## 2022-06-27 DIAGNOSIS — F31.9: ICD-10-CM

## 2022-06-27 DIAGNOSIS — R94.5: ICD-10-CM

## 2022-06-27 DIAGNOSIS — F19.280: ICD-10-CM

## 2022-06-27 DIAGNOSIS — F12.20: ICD-10-CM

## 2022-06-27 DIAGNOSIS — F10.282: ICD-10-CM

## 2022-06-27 DIAGNOSIS — G47.00: ICD-10-CM

## 2022-06-27 DIAGNOSIS — F14.20: ICD-10-CM

## 2022-06-27 DIAGNOSIS — Z87.891: ICD-10-CM

## 2022-06-27 DIAGNOSIS — K21.9: ICD-10-CM

## 2022-06-27 DIAGNOSIS — R63.8: ICD-10-CM

## 2022-06-27 DIAGNOSIS — I10: ICD-10-CM

## 2022-06-27 DIAGNOSIS — F19.24: ICD-10-CM

## 2022-06-27 PROCEDURE — U0003 INFECTIOUS AGENT DETECTION BY NUCLEIC ACID (DNA OR RNA); SEVERE ACUTE RESPIRATORY SYNDROME CORONAVIRUS 2 (SARS-COV-2) (CORONAVIRUS DISEASE [COVID-19]), AMPLIFIED PROBE TECHNIQUE, MAKING USE OF HIGH THROUGHPUT TECHNOLOGIES AS DESCRIBED BY CMS-2020-01-R: HCPCS

## 2022-06-27 PROCEDURE — U0005 INFEC AGEN DETEC AMPLI PROBE: HCPCS

## 2022-06-27 SDOH — ECONOMIC STABILITY - INCOME SECURITY: UNEMPLOYMENT, UNSPECIFIED: Z56.0

## 2022-06-28 PROCEDURE — HZ2ZZZZ DETOXIFICATION SERVICES FOR SUBSTANCE ABUSE TREATMENT: ICD-10-PCS | Performed by: SURGERY

## 2022-06-28 RX ADMIN — Medication SCH TAB: at 10:20

## 2022-06-28 RX ADMIN — Medication SCH: at 23:28

## 2022-06-28 RX ADMIN — LOPERAMIDE HYDROCHLORIDE PRN MG: 2 CAPSULE ORAL at 18:11

## 2022-06-28 RX ADMIN — DICYCLOMINE HYDROCHLORIDE PRN MG: 10 CAPSULE ORAL at 18:10

## 2022-06-29 LAB
ALBUMIN SERPL-MCNC: 3.8 G/DL (ref 3.4–5)
ALP SERPL-CCNC: 86 U/L (ref 45–117)
ALT SERPL-CCNC: 153 U/L (ref 13–61)
ANION GAP SERPL CALC-SCNC: 5 MMOL/L (ref 8–16)
AST SERPL-CCNC: 67 U/L (ref 15–37)
BILIRUB SERPL-MCNC: 0.8 MG/DL (ref 0.2–1)
BUN SERPL-MCNC: 13.4 MG/DL (ref 7–18)
CALCIUM SERPL-MCNC: 9.9 MG/DL (ref 8.5–10.1)
CHLORIDE SERPL-SCNC: 106 MMOL/L (ref 98–107)
CO2 SERPL-SCNC: 30 MMOL/L (ref 21–32)
CREAT SERPL-MCNC: 1 MG/DL (ref 0.55–1.3)
DEPRECATED RDW RBC AUTO: 14.6 % (ref 11.9–15.9)
GLUCOSE SERPL-MCNC: 103 MG/DL (ref 74–106)
HCT VFR BLD CALC: 42.1 % (ref 35.4–49)
HGB BLD-MCNC: 14.1 GM/DL (ref 11.7–16.9)
MCH RBC QN AUTO: 28.7 PG (ref 25.7–33.7)
MCHC RBC AUTO-ENTMCNC: 33.5 G/DL (ref 32–35.9)
MCV RBC: 85.7 FL (ref 80–96)
PLATELET # BLD AUTO: 200 10^3/UL (ref 134–434)
PMV BLD: 8.3 FL (ref 7.5–11.1)
PROT SERPL-MCNC: 8.2 G/DL (ref 6.4–8.2)
RBC # BLD AUTO: 4.91 M/MM3 (ref 4–5.6)
SODIUM SERPL-SCNC: 141 MMOL/L (ref 136–145)
WBC # BLD AUTO: 4.3 K/MM3 (ref 4–10)

## 2022-06-29 RX ADMIN — DICYCLOMINE HYDROCHLORIDE PRN MG: 10 CAPSULE ORAL at 10:18

## 2022-06-29 RX ADMIN — Medication SCH MG: at 22:09

## 2022-06-29 RX ADMIN — LOPERAMIDE HYDROCHLORIDE PRN MG: 2 CAPSULE ORAL at 18:04

## 2022-06-29 RX ADMIN — Medication SCH TAB: at 10:15

## 2022-06-29 RX ADMIN — DICYCLOMINE HYDROCHLORIDE PRN MG: 10 CAPSULE ORAL at 18:04

## 2022-06-30 RX ADMIN — METHOCARBAMOL PRN MG: 500 TABLET ORAL at 10:15

## 2022-06-30 RX ADMIN — ATENOLOL SCH MG: 50 TABLET ORAL at 15:15

## 2022-06-30 RX ADMIN — Medication SCH MG: at 22:06

## 2022-06-30 RX ADMIN — CHLORTHALIDONE SCH MG: 25 TABLET ORAL at 15:15

## 2022-06-30 RX ADMIN — Medication SCH TAB: at 10:12

## 2022-06-30 RX ADMIN — Medication SCH MG: at 22:09

## 2022-06-30 RX ADMIN — METHOCARBAMOL PRN MG: 500 TABLET ORAL at 22:09

## 2022-07-01 ENCOUNTER — HOSPITAL ENCOUNTER (INPATIENT)
Dept: HOSPITAL 74 - YASAS | Age: 52
LOS: 10 days | Discharge: HOME | DRG: 772 | End: 2022-07-11
Attending: PSYCHIATRY & NEUROLOGY | Admitting: ALLERGY & IMMUNOLOGY
Payer: COMMERCIAL

## 2022-07-01 VITALS — SYSTOLIC BLOOD PRESSURE: 132 MMHG | TEMPERATURE: 98.2 F | DIASTOLIC BLOOD PRESSURE: 74 MMHG | HEART RATE: 95 BPM

## 2022-07-01 DIAGNOSIS — R94.5: ICD-10-CM

## 2022-07-01 DIAGNOSIS — I10: ICD-10-CM

## 2022-07-01 DIAGNOSIS — K21.9: ICD-10-CM

## 2022-07-01 DIAGNOSIS — F10.20: Primary | ICD-10-CM

## 2022-07-01 DIAGNOSIS — F10.282: ICD-10-CM

## 2022-07-01 DIAGNOSIS — F12.20: ICD-10-CM

## 2022-07-01 DIAGNOSIS — E78.5: ICD-10-CM

## 2022-07-01 DIAGNOSIS — F31.9: ICD-10-CM

## 2022-07-01 DIAGNOSIS — Z86.11: ICD-10-CM

## 2022-07-01 DIAGNOSIS — F14.20: ICD-10-CM

## 2022-07-01 DIAGNOSIS — Z89.432: ICD-10-CM

## 2022-07-01 DIAGNOSIS — E87.6: ICD-10-CM

## 2022-07-01 PROCEDURE — HZ42ZZZ GROUP COUNSELING FOR SUBSTANCE ABUSE TREATMENT, COGNITIVE-BEHAVIORAL: ICD-10-PCS | Performed by: PSYCHIATRY & NEUROLOGY

## 2022-07-01 RX ADMIN — Medication SCH: at 22:12

## 2022-07-01 RX ADMIN — Medication SCH MG: at 22:59

## 2022-07-01 RX ADMIN — ATENOLOL SCH MG: 50 TABLET ORAL at 10:16

## 2022-07-01 RX ADMIN — Medication SCH TAB: at 10:16

## 2022-07-01 RX ADMIN — CHLORTHALIDONE SCH MG: 25 TABLET ORAL at 10:16

## 2022-07-02 RX ADMIN — CHLORTHALIDONE SCH MG: 25 TABLET ORAL at 10:10

## 2022-07-02 RX ADMIN — Medication SCH TAB: at 10:10

## 2022-07-02 RX ADMIN — ATENOLOL SCH MG: 50 TABLET ORAL at 10:10

## 2022-07-02 RX ADMIN — Medication SCH: at 22:00

## 2022-07-03 RX ADMIN — Medication SCH TAB: at 10:10

## 2022-07-03 RX ADMIN — ATENOLOL SCH MG: 50 TABLET ORAL at 10:10

## 2022-07-03 RX ADMIN — CHLORTHALIDONE SCH MG: 25 TABLET ORAL at 10:10

## 2022-07-03 RX ADMIN — HYDROXYZINE PAMOATE PRN MG: 25 CAPSULE ORAL at 06:40

## 2022-07-03 RX ADMIN — HYDROXYZINE PAMOATE PRN MG: 25 CAPSULE ORAL at 13:40

## 2022-07-03 RX ADMIN — Medication SCH MG: at 21:02

## 2022-07-03 RX ADMIN — HYDROXYZINE PAMOATE PRN MG: 25 CAPSULE ORAL at 18:03

## 2022-07-03 RX ADMIN — Medication SCH MG: at 21:01

## 2022-07-04 RX ADMIN — CHLORTHALIDONE SCH MG: 25 TABLET ORAL at 10:08

## 2022-07-04 RX ADMIN — ATENOLOL SCH MG: 50 TABLET ORAL at 10:07

## 2022-07-04 RX ADMIN — Medication SCH TAB: at 10:08

## 2022-07-04 RX ADMIN — Medication SCH: at 21:24

## 2022-07-04 RX ADMIN — HYDROXYZINE PAMOATE PRN MG: 25 CAPSULE ORAL at 21:24

## 2022-07-04 RX ADMIN — Medication SCH MG: at 21:24

## 2022-07-04 RX ADMIN — HYDROXYZINE PAMOATE PRN MG: 25 CAPSULE ORAL at 11:58

## 2022-07-05 RX ADMIN — Medication SCH MG: at 21:01

## 2022-07-05 RX ADMIN — Medication SCH TAB: at 10:13

## 2022-07-05 RX ADMIN — HYDROXYZINE PAMOATE PRN MG: 25 CAPSULE ORAL at 21:01

## 2022-07-05 RX ADMIN — FLUTICASONE PROPIONATE SCH SPRAY: 50 SPRAY, METERED NASAL at 21:01

## 2022-07-05 RX ADMIN — CHLORTHALIDONE SCH MG: 25 TABLET ORAL at 10:13

## 2022-07-05 RX ADMIN — ATENOLOL SCH MG: 50 TABLET ORAL at 10:13

## 2022-07-05 RX ADMIN — PSEUDOEPHEDRINE HCL AND TRIPROLIDINE HCL PRN COMBO: 60; 2.5 TABLET, FILM COATED ORAL at 21:02

## 2022-07-06 RX ADMIN — Medication SCH MG: at 21:11

## 2022-07-06 RX ADMIN — FLUTICASONE PROPIONATE SCH: 50 SPRAY, METERED NASAL at 09:44

## 2022-07-06 RX ADMIN — PSEUDOEPHEDRINE HCL AND TRIPROLIDINE HCL PRN COMBO: 60; 2.5 TABLET, FILM COATED ORAL at 21:12

## 2022-07-06 RX ADMIN — Medication SCH TAB: at 09:44

## 2022-07-06 RX ADMIN — HYDROXYZINE PAMOATE PRN MG: 25 CAPSULE ORAL at 21:11

## 2022-07-06 RX ADMIN — FLUTICASONE PROPIONATE SCH: 50 SPRAY, METERED NASAL at 21:12

## 2022-07-06 RX ADMIN — ATENOLOL SCH MG: 50 TABLET ORAL at 09:44

## 2022-07-06 RX ADMIN — CHLORTHALIDONE SCH MG: 25 TABLET ORAL at 09:44

## 2022-07-07 RX ADMIN — CHLORTHALIDONE SCH MG: 25 TABLET ORAL at 10:08

## 2022-07-07 RX ADMIN — HYDROXYZINE PAMOATE PRN MG: 25 CAPSULE ORAL at 14:24

## 2022-07-07 RX ADMIN — Medication SCH TAB: at 10:08

## 2022-07-07 RX ADMIN — ATENOLOL SCH MG: 50 TABLET ORAL at 10:08

## 2022-07-07 RX ADMIN — HYDROXYZINE PAMOATE PRN MG: 25 CAPSULE ORAL at 21:11

## 2022-07-07 RX ADMIN — Medication SCH MG: at 21:11

## 2022-07-07 RX ADMIN — FLUTICASONE PROPIONATE SCH: 50 SPRAY, METERED NASAL at 21:11

## 2022-07-07 RX ADMIN — FLUTICASONE PROPIONATE SCH: 50 SPRAY, METERED NASAL at 10:08

## 2022-07-08 RX ADMIN — FLUTICASONE PROPIONATE SCH: 50 SPRAY, METERED NASAL at 23:00

## 2022-07-08 RX ADMIN — CHLORTHALIDONE SCH MG: 25 TABLET ORAL at 10:13

## 2022-07-08 RX ADMIN — FLUTICASONE PROPIONATE SCH: 50 SPRAY, METERED NASAL at 10:13

## 2022-07-08 RX ADMIN — HYDROXYZINE PAMOATE PRN MG: 25 CAPSULE ORAL at 13:11

## 2022-07-08 RX ADMIN — Medication SCH: at 23:00

## 2022-07-08 RX ADMIN — ATENOLOL SCH MG: 50 TABLET ORAL at 10:13

## 2022-07-08 RX ADMIN — Medication SCH TAB: at 10:13

## 2022-07-09 RX ADMIN — HYDROXYZINE PAMOATE PRN MG: 25 CAPSULE ORAL at 21:38

## 2022-07-09 RX ADMIN — FLUTICASONE PROPIONATE SCH SPRAY: 50 SPRAY, METERED NASAL at 10:00

## 2022-07-09 RX ADMIN — ALUMINUM HYDROXIDE, MAGNESIUM HYDROXIDE, AND SIMETHICONE PRN ML: 200; 200; 20 SUSPENSION ORAL at 10:02

## 2022-07-09 RX ADMIN — FLUTICASONE PROPIONATE SCH SPRAY: 50 SPRAY, METERED NASAL at 21:37

## 2022-07-09 RX ADMIN — Medication SCH MG: at 21:38

## 2022-07-09 RX ADMIN — CHLORTHALIDONE SCH MG: 25 TABLET ORAL at 10:00

## 2022-07-09 RX ADMIN — ATENOLOL SCH MG: 50 TABLET ORAL at 10:00

## 2022-07-09 RX ADMIN — Medication SCH TAB: at 10:00

## 2022-07-10 RX ADMIN — Medication SCH: at 22:13

## 2022-07-10 RX ADMIN — FLUTICASONE PROPIONATE SCH SPRAY: 50 SPRAY, METERED NASAL at 10:09

## 2022-07-10 RX ADMIN — CHLORTHALIDONE SCH MG: 25 TABLET ORAL at 10:09

## 2022-07-10 RX ADMIN — ALUMINUM HYDROXIDE, MAGNESIUM HYDROXIDE, AND SIMETHICONE PRN ML: 200; 200; 20 SUSPENSION ORAL at 10:39

## 2022-07-10 RX ADMIN — Medication SCH TAB: at 10:09

## 2022-07-10 RX ADMIN — HYDROXYZINE PAMOATE PRN MG: 25 CAPSULE ORAL at 10:10

## 2022-07-10 RX ADMIN — ATENOLOL SCH MG: 50 TABLET ORAL at 10:09

## 2022-07-10 RX ADMIN — FLUTICASONE PROPIONATE SCH: 50 SPRAY, METERED NASAL at 22:13

## 2022-07-11 VITALS — SYSTOLIC BLOOD PRESSURE: 137 MMHG | HEART RATE: 74 BPM | TEMPERATURE: 97.7 F | DIASTOLIC BLOOD PRESSURE: 81 MMHG

## 2022-10-21 ENCOUNTER — HOSPITAL ENCOUNTER (INPATIENT)
Dept: HOSPITAL 74 - YASAS | Age: 52
LOS: 4 days | Discharge: HOME | End: 2022-10-25
Attending: SURGERY | Admitting: ALLERGY & IMMUNOLOGY
Payer: COMMERCIAL

## 2022-10-21 VITALS — BODY MASS INDEX: 30.2 KG/M2

## 2022-10-21 DIAGNOSIS — K21.9: ICD-10-CM

## 2022-10-21 DIAGNOSIS — Z86.11: ICD-10-CM

## 2022-10-21 DIAGNOSIS — R63.8: ICD-10-CM

## 2022-10-21 DIAGNOSIS — F12.20: ICD-10-CM

## 2022-10-21 DIAGNOSIS — F14.20: ICD-10-CM

## 2022-10-21 DIAGNOSIS — F10.230: Primary | ICD-10-CM

## 2022-10-21 DIAGNOSIS — F17.210: ICD-10-CM

## 2022-10-21 DIAGNOSIS — I10: ICD-10-CM

## 2022-10-21 DIAGNOSIS — Z89.432: ICD-10-CM

## 2022-10-21 DIAGNOSIS — R74.8: ICD-10-CM

## 2022-10-21 LAB
ALBUMIN SERPL-MCNC: 3.4 G/DL (ref 3.4–5)
ALP SERPL-CCNC: 75 U/L (ref 45–117)
ALT SERPL-CCNC: 369 U/L (ref 13–61)
ANION GAP SERPL CALC-SCNC: 10 MMOL/L (ref 8–16)
AST SERPL-CCNC: 228 U/L (ref 15–37)
BILIRUB SERPL-MCNC: 0.3 MG/DL (ref 0.2–1)
BUN SERPL-MCNC: 11.4 MG/DL (ref 7–18)
CALCIUM SERPL-MCNC: 8.7 MG/DL (ref 8.5–10.1)
CHLORIDE SERPL-SCNC: 109 MMOL/L (ref 98–107)
CO2 SERPL-SCNC: 25 MMOL/L (ref 21–32)
CREAT SERPL-MCNC: 1.1 MG/DL (ref 0.55–1.3)
DEPRECATED RDW RBC AUTO: 15 % (ref 11.9–15.9)
GLUCOSE SERPL-MCNC: 100 MG/DL (ref 74–106)
HCT VFR BLD CALC: 38.6 % (ref 35.4–49)
HGB BLD-MCNC: 12.7 GM/DL (ref 11.7–16.9)
MCH RBC QN AUTO: 28.7 PG (ref 25.7–33.7)
MCHC RBC AUTO-ENTMCNC: 32.9 G/DL (ref 32–35.9)
MCV RBC: 87.1 FL (ref 80–96)
PLATELET # BLD AUTO: 197 10^3/UL (ref 134–434)
PMV BLD: 7.9 FL (ref 7.5–11.1)
PROT SERPL-MCNC: 7.8 G/DL (ref 6.4–8.2)
RBC # BLD AUTO: 4.43 M/MM3 (ref 4–5.6)
SODIUM SERPL-SCNC: 144 MMOL/L (ref 136–145)
WBC # BLD AUTO: 4.1 K/MM3 (ref 4–10)

## 2022-10-21 PROCEDURE — U0003 INFECTIOUS AGENT DETECTION BY NUCLEIC ACID (DNA OR RNA); SEVERE ACUTE RESPIRATORY SYNDROME CORONAVIRUS 2 (SARS-COV-2) (CORONAVIRUS DISEASE [COVID-19]), AMPLIFIED PROBE TECHNIQUE, MAKING USE OF HIGH THROUGHPUT TECHNOLOGIES AS DESCRIBED BY CMS-2020-01-R: HCPCS

## 2022-10-21 PROCEDURE — HZ2ZZZZ DETOXIFICATION SERVICES FOR SUBSTANCE ABUSE TREATMENT: ICD-10-PCS | Performed by: SURGERY

## 2022-10-21 PROCEDURE — U0005 INFEC AGEN DETEC AMPLI PROBE: HCPCS

## 2022-10-21 RX ADMIN — Medication SCH MG: at 22:44

## 2022-10-21 RX ADMIN — Medication SCH TAB: at 10:30

## 2022-10-22 RX ADMIN — Medication SCH: at 22:51

## 2022-10-22 RX ADMIN — Medication SCH TAB: at 10:12

## 2022-10-23 RX ADMIN — Medication SCH MG: at 22:22

## 2022-10-23 RX ADMIN — Medication SCH TAB: at 10:58

## 2022-10-24 RX ADMIN — Medication SCH TAB: at 10:00

## 2022-10-24 RX ADMIN — Medication SCH: at 22:27

## 2022-10-25 VITALS
SYSTOLIC BLOOD PRESSURE: 118 MMHG | DIASTOLIC BLOOD PRESSURE: 78 MMHG | HEART RATE: 82 BPM | RESPIRATION RATE: 18 BRPM | TEMPERATURE: 97.8 F

## 2023-07-12 ENCOUNTER — HOSPITAL ENCOUNTER (INPATIENT)
Dept: HOSPITAL 74 - YASAS | Age: 53
LOS: 5 days | Discharge: OTHER INSTITUTION | End: 2023-07-17
Attending: SURGERY | Admitting: ALLERGY & IMMUNOLOGY
Payer: COMMERCIAL

## 2023-07-12 VITALS — BODY MASS INDEX: 27.3 KG/M2

## 2023-07-12 DIAGNOSIS — F10.230: Primary | ICD-10-CM

## 2023-07-12 DIAGNOSIS — Z86.11: ICD-10-CM

## 2023-07-12 DIAGNOSIS — Z89.432: ICD-10-CM

## 2023-07-12 DIAGNOSIS — F12.20: ICD-10-CM

## 2023-07-12 DIAGNOSIS — R74.8: ICD-10-CM

## 2023-07-12 DIAGNOSIS — E78.00: ICD-10-CM

## 2023-07-12 DIAGNOSIS — F14.20: ICD-10-CM

## 2023-07-12 DIAGNOSIS — F32.A: ICD-10-CM

## 2023-07-12 DIAGNOSIS — I10: ICD-10-CM

## 2023-07-12 DIAGNOSIS — K21.9: ICD-10-CM

## 2023-07-13 LAB
ALBUMIN SERPL-MCNC: 3.7 G/DL (ref 3.4–5)
ALP SERPL-CCNC: 67 U/L (ref 45–117)
ALT SERPL-CCNC: 234 U/L (ref 13–61)
ANION GAP SERPL CALC-SCNC: 9 MMOL/L (ref 8–16)
AST SERPL-CCNC: 103 U/L (ref 15–37)
BILIRUB SERPL-MCNC: 0.4 MG/DL (ref 0.2–1)
BUN SERPL-MCNC: 15.3 MG/DL (ref 7–18)
CALCIUM SERPL-MCNC: 9.9 MG/DL (ref 8.5–10.1)
CHLORIDE SERPL-SCNC: 106 MMOL/L (ref 98–107)
CO2 SERPL-SCNC: 27 MMOL/L (ref 21–32)
CREAT SERPL-MCNC: 1 MG/DL (ref 0.55–1.3)
DEPRECATED RDW RBC AUTO: 14.7 % (ref 11.9–15.9)
GLUCOSE SERPL-MCNC: 97 MG/DL (ref 74–106)
HCT VFR BLD CALC: 41.3 % (ref 35.4–49)
HGB BLD-MCNC: 13.4 GM/DL (ref 11.7–16.9)
MCH RBC QN AUTO: 28.4 PG (ref 25.7–33.7)
MCHC RBC AUTO-ENTMCNC: 32.5 G/DL (ref 32–35.9)
MCV RBC: 87.2 FL (ref 80–96)
PLATELET # BLD AUTO: 219 10^3/UL (ref 134–434)
PMV BLD: 8.4 FL (ref 7.5–11.1)
POTASSIUM SERPLBLD-SCNC: 4 MMOL/L (ref 3.5–5.1)
PROT SERPL-MCNC: 8.3 G/DL (ref 6.4–8.2)
RBC # BLD AUTO: 4.73 M/MM3 (ref 4–5.6)
SODIUM SERPL-SCNC: 142 MMOL/L (ref 136–145)
WBC # BLD AUTO: 4.3 K/MM3 (ref 4–10)

## 2023-07-13 PROCEDURE — HZ2ZZZZ DETOXIFICATION SERVICES FOR SUBSTANCE ABUSE TREATMENT: ICD-10-PCS | Performed by: SURGERY

## 2023-07-13 RX ADMIN — Medication SCH MG: at 22:07

## 2023-07-13 RX ADMIN — ATENOLOL SCH MG: 50 TABLET ORAL at 10:33

## 2023-07-13 RX ADMIN — Medication SCH TAB: at 10:33

## 2023-07-13 RX ADMIN — CHLORTHALIDONE SCH MG: 25 TABLET ORAL at 10:33

## 2023-07-14 RX ADMIN — Medication SCH TAB: at 10:29

## 2023-07-14 RX ADMIN — Medication SCH MG: at 22:34

## 2023-07-14 RX ADMIN — ATENOLOL SCH MG: 50 TABLET ORAL at 10:29

## 2023-07-14 RX ADMIN — Medication SCH MG: at 22:33

## 2023-07-14 RX ADMIN — CHLORTHALIDONE SCH MG: 25 TABLET ORAL at 10:29

## 2023-07-15 RX ADMIN — ATENOLOL SCH MG: 50 TABLET ORAL at 10:49

## 2023-07-15 RX ADMIN — Medication SCH MG: at 22:36

## 2023-07-15 RX ADMIN — METHOCARBAMOL PRN MG: 500 TABLET ORAL at 10:49

## 2023-07-15 RX ADMIN — Medication SCH TAB: at 10:49

## 2023-07-15 RX ADMIN — CHLORTHALIDONE SCH MG: 25 TABLET ORAL at 10:49

## 2023-07-15 RX ADMIN — HYDROXYZINE PAMOATE PRN MG: 25 CAPSULE ORAL at 10:49

## 2023-07-16 RX ADMIN — Medication SCH TAB: at 10:09

## 2023-07-16 RX ADMIN — Medication SCH MG: at 22:04

## 2023-07-16 RX ADMIN — ATENOLOL SCH MG: 50 TABLET ORAL at 10:09

## 2023-07-16 RX ADMIN — HYDROXYZINE PAMOATE PRN MG: 25 CAPSULE ORAL at 10:09

## 2023-07-16 RX ADMIN — CHLORTHALIDONE SCH MG: 25 TABLET ORAL at 10:09

## 2023-07-16 RX ADMIN — METHOCARBAMOL PRN MG: 500 TABLET ORAL at 10:09

## 2023-07-16 RX ADMIN — HYDROXYZINE PAMOATE PRN MG: 25 CAPSULE ORAL at 22:05

## 2023-07-17 ENCOUNTER — HOSPITAL ENCOUNTER (INPATIENT)
Dept: HOSPITAL 74 - YASAS | Age: 53
LOS: 1 days | Discharge: LEFT BEFORE DISCHARGE | DRG: 770 | End: 2023-07-18
Attending: PSYCHIATRY & NEUROLOGY | Admitting: ALLERGY & IMMUNOLOGY
Payer: COMMERCIAL

## 2023-07-17 VITALS — RESPIRATION RATE: 18 BRPM

## 2023-07-17 VITALS
HEART RATE: 91 BPM | SYSTOLIC BLOOD PRESSURE: 138 MMHG | RESPIRATION RATE: 17 BRPM | DIASTOLIC BLOOD PRESSURE: 88 MMHG | TEMPERATURE: 96.9 F

## 2023-07-17 DIAGNOSIS — E78.00: ICD-10-CM

## 2023-07-17 DIAGNOSIS — F10.20: Primary | ICD-10-CM

## 2023-07-17 DIAGNOSIS — K21.9: ICD-10-CM

## 2023-07-17 DIAGNOSIS — Z86.11: ICD-10-CM

## 2023-07-17 DIAGNOSIS — I10: ICD-10-CM

## 2023-07-17 PROCEDURE — HZ42ZZZ GROUP COUNSELING FOR SUBSTANCE ABUSE TREATMENT, COGNITIVE-BEHAVIORAL: ICD-10-PCS | Performed by: PSYCHIATRY & NEUROLOGY

## 2023-07-17 RX ADMIN — ATENOLOL SCH MG: 50 TABLET ORAL at 10:01

## 2023-07-17 RX ADMIN — Medication SCH TAB: at 10:01

## 2023-07-17 RX ADMIN — CHLORTHALIDONE SCH MG: 25 TABLET ORAL at 10:01

## 2023-07-18 VITALS — TEMPERATURE: 96.9 F

## 2023-07-18 VITALS — DIASTOLIC BLOOD PRESSURE: 88 MMHG | SYSTOLIC BLOOD PRESSURE: 140 MMHG | HEART RATE: 81 BPM

## 2024-06-06 NOTE — ED PROVIDER NOTE - SKIN, MLM
BMP    Misc nursing order (specify)    POC Pregnancy Urine Qual    POC Pregnancy Urine Qual        Medications given during this emergency department visit:  Medications   dexAMETHasone (DECADRON) injection 10 mg (10 mg IntraVENous Given 6/6/24 1327)   ketorolac (TORADOL) injection 15 mg (15 mg IntraVENous Given 6/6/24 1325)   metoclopramide (REGLAN) injection 10 mg (10 mg IntraVENous Given 6/6/24 1328)   diphenhydrAMINE (BENADRYL) injection 25 mg (25 mg IntraVENous Given 6/6/24 1330)   diphenhydrAMINE (BENADRYL) injection 25 mg (25 mg IntraVENous Given 6/6/24 1400)       Discharge Medication List as of 6/6/2024  2:23 PM           History reviewed. No pertinent past medical history.     Past Surgical History:   Procedure Laterality Date    OTHER SURGICAL HISTORY      laser wart removal        Social History     Socioeconomic History    Marital status: Single     Spouse name: None    Number of children: None    Years of education: None    Highest education level: None   Tobacco Use    Smoking status: Never   Substance and Sexual Activity    Alcohol use: No    Drug use: No        Discharge Medication List as of 6/6/2024  2:23 PM        CONTINUE these medications which have NOT CHANGED    Details   promethazine (PHENERGAN) 25 MG tablet Take 12.5 mg by mouth every 6 hours as neededHistorical Med              Results for orders placed or performed during the hospital encounter of 06/06/24   CBC with Auto Differential   Result Value Ref Range    WBC 5.4 4.3 - 11.1 K/uL    RBC 4.69 4.05 - 5.2 M/uL    Hemoglobin 14.5 11.7 - 15.4 g/dL    Hematocrit 44.4 35.8 - 46.3 %    MCV 94.7 82 - 102 FL    MCH 30.9 26.1 - 32.9 PG    MCHC 32.7 31.4 - 35.0 g/dL    RDW 12.2 11.9 - 14.6 %    Platelets 377 150 - 450 K/uL    MPV 8.8 (L) 9.4 - 12.3 FL    nRBC 0.00 0.0 - 0.2 K/uL    Differential Type AUTOMATED      Neutrophils % 41 (L) 43 - 78 %    Lymphocytes % 47 (H) 13 - 44 %    Monocytes % 9 4.0 - 12.0 %    Eosinophils % 2 0.5 - 7.8 %     Skin normal color for race, warm, dry and intact. No evidence of rash.

## 2024-08-10 ENCOUNTER — EMERGENCY (EMERGENCY)
Facility: HOSPITAL | Age: 54
LOS: 1 days | Discharge: HOME | End: 2024-08-10
Attending: EMERGENCY MEDICINE | Admitting: EMERGENCY MEDICINE
Payer: MEDICAID

## 2024-08-10 VITALS
HEART RATE: 118 BPM | RESPIRATION RATE: 18 BRPM | TEMPERATURE: 98 F | WEIGHT: 220.02 LBS | SYSTOLIC BLOOD PRESSURE: 149 MMHG | HEIGHT: 67 IN | DIASTOLIC BLOOD PRESSURE: 91 MMHG | OXYGEN SATURATION: 99 %

## 2024-08-10 VITALS
DIASTOLIC BLOOD PRESSURE: 79 MMHG | OXYGEN SATURATION: 96 % | RESPIRATION RATE: 18 BRPM | HEART RATE: 97 BPM | SYSTOLIC BLOOD PRESSURE: 136 MMHG

## 2024-08-10 DIAGNOSIS — F41.9 ANXIETY DISORDER, UNSPECIFIED: ICD-10-CM

## 2024-08-10 DIAGNOSIS — I10 ESSENTIAL (PRIMARY) HYPERTENSION: ICD-10-CM

## 2024-08-10 LAB
ALBUMIN SERPL ELPH-MCNC: 4 G/DL — SIGNIFICANT CHANGE UP (ref 3.4–5)
ALP SERPL-CCNC: 71 U/L — SIGNIFICANT CHANGE UP (ref 40–120)
ALT FLD-CCNC: 322 U/L — HIGH (ref 12–42)
ANION GAP SERPL CALC-SCNC: 18 MMOL/L — HIGH (ref 9–16)
AST SERPL-CCNC: 200 U/L — HIGH (ref 15–37)
BASOPHILS # BLD AUTO: 0.03 K/UL — SIGNIFICANT CHANGE UP (ref 0–0.2)
BASOPHILS NFR BLD AUTO: 0.5 % — SIGNIFICANT CHANGE UP (ref 0–2)
BILIRUB SERPL-MCNC: 0.7 MG/DL — SIGNIFICANT CHANGE UP (ref 0.2–1.2)
BUN SERPL-MCNC: 15 MG/DL — SIGNIFICANT CHANGE UP (ref 7–23)
CALCIUM SERPL-MCNC: 10.4 MG/DL — SIGNIFICANT CHANGE UP (ref 8.5–10.5)
CHLORIDE SERPL-SCNC: 99 MMOL/L — SIGNIFICANT CHANGE UP (ref 96–108)
CO2 SERPL-SCNC: 21 MMOL/L — LOW (ref 22–31)
CREAT SERPL-MCNC: 1.09 MG/DL — SIGNIFICANT CHANGE UP (ref 0.5–1.3)
EGFR: 82 ML/MIN/1.73M2 — SIGNIFICANT CHANGE UP
EOSINOPHIL # BLD AUTO: 0.02 K/UL — SIGNIFICANT CHANGE UP (ref 0–0.5)
EOSINOPHIL NFR BLD AUTO: 0.3 % — SIGNIFICANT CHANGE UP (ref 0–6)
GLUCOSE BLDC GLUCOMTR-MCNC: 89 MG/DL — SIGNIFICANT CHANGE UP (ref 70–99)
GLUCOSE SERPL-MCNC: 89 MG/DL — SIGNIFICANT CHANGE UP (ref 70–99)
HCT VFR BLD CALC: 41 % — SIGNIFICANT CHANGE UP (ref 39–50)
HGB BLD-MCNC: 14.2 G/DL — SIGNIFICANT CHANGE UP (ref 13–17)
IMM GRANULOCYTES NFR BLD AUTO: 0.2 % — SIGNIFICANT CHANGE UP (ref 0–0.9)
LYMPHOCYTES # BLD AUTO: 3.64 K/UL — HIGH (ref 1–3.3)
LYMPHOCYTES # BLD AUTO: 61.9 % — HIGH (ref 13–44)
MCHC RBC-ENTMCNC: 29.5 PG — SIGNIFICANT CHANGE UP (ref 27–34)
MCHC RBC-ENTMCNC: 34.6 GM/DL — SIGNIFICANT CHANGE UP (ref 32–36)
MCV RBC AUTO: 85.1 FL — SIGNIFICANT CHANGE UP (ref 80–100)
MONOCYTES # BLD AUTO: 0.62 K/UL — SIGNIFICANT CHANGE UP (ref 0–0.9)
MONOCYTES NFR BLD AUTO: 10.5 % — SIGNIFICANT CHANGE UP (ref 2–14)
NEUTROPHILS # BLD AUTO: 1.56 K/UL — LOW (ref 1.8–7.4)
NEUTROPHILS NFR BLD AUTO: 26.6 % — LOW (ref 43–77)
NRBC # BLD: 0 /100 WBCS — SIGNIFICANT CHANGE UP (ref 0–0)
PLATELET # BLD AUTO: 234 K/UL — SIGNIFICANT CHANGE UP (ref 150–400)
POTASSIUM SERPL-MCNC: 4.1 MMOL/L — SIGNIFICANT CHANGE UP (ref 3.5–5.3)
POTASSIUM SERPL-SCNC: 4.1 MMOL/L — SIGNIFICANT CHANGE UP (ref 3.5–5.3)
PROT SERPL-MCNC: 9.7 G/DL — HIGH (ref 6.4–8.2)
RBC # BLD: 4.82 M/UL — SIGNIFICANT CHANGE UP (ref 4.2–5.8)
RBC # FLD: 14.7 % — HIGH (ref 10.3–14.5)
SODIUM SERPL-SCNC: 138 MMOL/L — SIGNIFICANT CHANGE UP (ref 132–145)
WBC # BLD: 5.88 K/UL — SIGNIFICANT CHANGE UP (ref 3.8–10.5)
WBC # FLD AUTO: 5.88 K/UL — SIGNIFICANT CHANGE UP (ref 3.8–10.5)

## 2024-08-10 PROCEDURE — 99284 EMERGENCY DEPT VISIT MOD MDM: CPT

## 2024-08-10 RX ORDER — BACTERIOSTATIC SODIUM CHLORIDE 0.9 %
1000 VIAL (ML) INJECTION ONCE
Refills: 0 | Status: COMPLETED | OUTPATIENT
Start: 2024-08-10 | End: 2024-08-10

## 2024-08-10 RX ADMIN — Medication 1000 MILLILITER(S): at 21:18

## 2024-08-10 RX ADMIN — Medication 1000 MILLILITER(S): at 20:41

## 2024-08-10 NOTE — ED PROVIDER NOTE - OBJECTIVE STATEMENT
53-year-old male with past medical history of HTN and alcohol abuse presents to the ED for anxiety. Patient states he has been on a leung since July 4 and has been drinking lots of beer.  He was out today drinking and he felt anxious and was concerned he might be in withdrawal even though he drank today so he came to the emergency department. No hallucinations. No tremor.

## 2024-08-10 NOTE — ED PROVIDER NOTE - NSFOLLOWUPINSTRUCTIONS_ED_ALL_ED_FT
Alcohol Use Disorder  Alcohol use disorder is a condition in which drinking disrupts daily life. People with this condition drink too much alcohol and cannot control their drinking.    Alcohol use disorder can cause serious problems with physical health. It can affect the brain, heart, and other internal organs. This disorder can raise the risk for certain cancers and cause problems with mental health, such as depression or anxiety.    What are the causes?  This condition is caused by drinking too much alcohol over time. Some people with this condition drink to cope with or escape from negative life events. Others drink to relieve symptoms of physical pain or symptoms of mental illness.    What increases the risk?  You are more likely to develop this condition if:  You have a family history of alcohol use disorder.  Your culture encourages drinking to the point of becoming drunk (intoxication).  You had a mood or conduct disorder in childhood.  You have been abused.  You are an adolescent and you:  Have poor performance in school.  Have poor supervision or guidance.  Act on impulse and like taking risks.  What are the signs or symptoms?  Symptoms of this condition include:  Drinking more than you want to.  Trying several times without success to drink less.  Spending a lot of time thinking about alcohol, getting alcohol, drinking alcohol, or recovering from drinking alcohol.  Continuing to drink even when it is causing serious problems in your daily life.  Drinking when it is dangerous to drink, such as before driving a car.  Needing more and more alcohol to get the same effect you want (building up tolerance).  Having symptoms of withdrawal when you stop drinking. Withdrawal symptoms may include:  Trouble sleeping, leading to tiredness (fatigue).  Mood swings of depression and anxiety.  Physical symptoms, such as a fast heart rate, rapid breathing, high blood pressure (hypertension), fever, cold sweats, or nausea.  Seizures.  Severe confusion.  Feeling or seeing things that are not there (hallucinations).  Shaking movements that you cannot control (tremors).  How is this diagnosed?  This condition is diagnosed with an assessment. Your health care provider may start by asking three or four questions about your drinking, or they may give you a simple test to take. This helps to get clear information from you.    You may also have a physical exam or lab tests. You may be referred to a substance abuse counselor.    How is this treated?  Two people talking with a counselor.  With education, some people with alcohol use disorder are able to reduce their drinking. Many with this disorder cannot change their drinking behavior on their own and need help with treatment from substance use specialists. Treatments may include:  Detoxification. Detoxification involves quitting drinking with supervision and direction of health care providers. Your health care provider may prescribe medicines within the first week to help lessen withdrawal symptoms. Alcohol withdrawal can be dangerous and life-threatening. Detoxification may be provided in a home, community, or primary care setting, or in a hospital or substance use treatment facility.  Counseling. This may involve motivational interviewing (MI), family therapy, or cognitive behavioral therapy (CBT). A counselor can address the things you can do to change your drinking behavior and how to maintain the changes. Talk therapy aims to:  Identify your positive motivations to change.  Identify and avoid the things that trigger your drinking.  Help you learn how to plan your behavior change.  Develop support systems that can help you sustain the change.  Medicines. Medicines can help treat this disorder by:  Decreasing cravings.  Decreasing the positive feeling you have when you drink.  Causing an uncomfortable physical reaction when you drink (aversion therapy).  Support groups such as Alcoholics Anonymous (AA). These groups are led by people who have quit drinking. The groups provide emotional support, advice, and guidance.  Some people with this condition benefit from a combination of treatments provided by specialized substance use treatment centers.    Follow these instructions at home:  A sign telling a person not to drink beer, wine, or hard liquor.   Medicines    Take over-the-counter and prescription medicines only as told by your health care provider.  Ask before starting any new medicines, herbs, or supplements.  General instructions    Ask friends and family members to support your choice to stay sober.  Avoid places where alcohol is served.  Create a plan to deal with tempting situations.  Attend support groups regularly.  Practice hobbies or activities you enjoy.  Do not drink and drive.  How is this prevented?  Do not drink alcohol if your health care provider tells you not to drink.  If you drink alcohol:  Limit how much you have to:  0–1 drink a day for women who are not pregnant.  0–2 drinks a day for men.  Know how much alcohol is in your drink. In the [place] one drink equals one 12 oz bottle of beer (355 mL), one 5 oz glass of wine (148 mL), or one 1½ oz glass of hard liquor (44 mL).  If you have a mental health condition, seek treatment.  Develop a healthy lifestyle through:  Meditation or deep breathing.  Exercise.  Spending time in nature.  Listening to music.  Talking with a trusted friend or family member.  If you are a teen:  Do not drink alcohol. Avoid gatherings where you might be tempted to drink alcohol.  Do not be afraid to say no if someone offers you alcohol. Speak up about why you do not want to drink. Set a positive example for others around you by not drinking.  Build relationships with friends who do not drink.  Where to find more information  Substance Abuse and Mental Health Services Administration: samhsa.gov  Alcoholics Anonymous: aa.org  Contact a health care provider if:  You cannot take your medicines as told.  Your symptoms get worse or you experience symptoms of withdrawal when you stop drinking.  You start drinking again (relapse) and your symptoms get worse.  Get help right away if:  You have thoughts about hurting yourself or others.  Get help right away if you feel like you may hurt yourself or others, or have thoughts about taking your own life. Go to your nearest emergency room or:  Call 911.  Call the National Suicide Prevention Lifeline at 1-462.772.4181 or 038. This is open 24 hours a day.  Text the Crisis Text Line at 502607.  Summary  Alcohol use disorder is a condition in which drinking disrupts daily life. People with this condition drink too much alcohol and cannot control their drinking.  Treatment may include detoxification, counseling, medicines, and support groups.  Ask friends and family members to support you. Avoid situations where alcohol is served.  Get help right away if you have thoughts about hurting yourself or others.  This information is not intended to replace advice given to you by your health care provider. Make sure you discuss any questions you have with your health care provider.

## 2024-08-10 NOTE — ED PROVIDER NOTE - CLINICAL SUMMARY MEDICAL DECISION MAKING FREE TEXT BOX
54 yo Male presents emergency department for alcohol use concern for withdrawal anxiety.  Patient had labs which demonstrated elevated LFTs due to alcohol use.  Patient not in alcohol withdrawal at this time.  Vital signs within normal limits.  Patient given resources for detox and discharged home.

## 2024-08-10 NOTE — ED PROVIDER NOTE - PHYSICAL EXAMINATION
Const: No apparent distress  Eyes: PERRL, b/l conjunctival injection  HENT:  Neck supple without meningismus, no tongue fasciculations.   CV: RRR, Warm, well-perfused extremities  RESP: CTA B/L, no tachypnea   GI: soft, non-tender, non-distended  MSK: No gross deformities appreciated  Skin: Warm, dry. No rashes  Neuro: Alert, CNs II-XII grossly intact. Sensation and motor function of extremities grossly intact.no tremor

## 2024-08-10 NOTE — ED ADULT TRIAGE NOTE - CHIEF COMPLAINT QUOTE
pt flagged down ems on street c/o "etoh withdrawal" pt appears intoxicated at this time; tremulous hands, tongue fasiculation, flushed skin, aaox4 steady gait states he had "a few pints of vodka" at 1800 today. placed in chair by nurses' station to speak with provider and determine appropriate treatment area in department. hx htn on atenolol

## 2024-08-10 NOTE — ED ADULT NURSE NOTE - OBJECTIVE STATEMENT
pt presents to ed for etoh withdrawal. has been drinking etoh since july 4. felt he was going through withdrawal so he flagged down EMS   please see associated CIWA flowsheet

## 2024-08-10 NOTE — ED PROVIDER NOTE - PATIENT PORTAL LINK FT
You can access the FollowMyHealth Patient Portal offered by [facility] by registering at the following website: [email or web address]. By joining [employer]’s FollowMyHealth portal, you will also be able to view your health information using other applications (apps) compatible with our system.

## 2024-08-11 PROBLEM — R42 DIZZINESS AND GIDDINESS: Chronic | Status: ACTIVE | Noted: 2021-09-16

## 2025-01-27 ENCOUNTER — EMERGENCY (EMERGENCY)
Facility: HOSPITAL | Age: 55
LOS: 1 days | Discharge: HOME | End: 2025-01-27
Attending: STUDENT IN AN ORGANIZED HEALTH CARE EDUCATION/TRAINING PROGRAM | Admitting: EMERGENCY MEDICINE
Payer: COMMERCIAL

## 2025-01-27 VITALS
RESPIRATION RATE: 16 BRPM | HEART RATE: 78 BPM | DIASTOLIC BLOOD PRESSURE: 83 MMHG | OXYGEN SATURATION: 100 % | WEIGHT: 190.04 LBS | TEMPERATURE: 98 F | SYSTOLIC BLOOD PRESSURE: 141 MMHG

## 2025-01-27 VITALS
RESPIRATION RATE: 16 BRPM | DIASTOLIC BLOOD PRESSURE: 78 MMHG | HEART RATE: 72 BPM | OXYGEN SATURATION: 97 % | SYSTOLIC BLOOD PRESSURE: 128 MMHG

## 2025-01-27 DIAGNOSIS — R68.2 DRY MOUTH, UNSPECIFIED: ICD-10-CM

## 2025-01-27 DIAGNOSIS — R42 DIZZINESS AND GIDDINESS: ICD-10-CM

## 2025-01-27 DIAGNOSIS — Z87.891 PERSONAL HISTORY OF NICOTINE DEPENDENCE: ICD-10-CM

## 2025-01-27 DIAGNOSIS — K74.60 UNSPECIFIED CIRRHOSIS OF LIVER: ICD-10-CM

## 2025-01-27 DIAGNOSIS — E66.9 OBESITY, UNSPECIFIED: ICD-10-CM

## 2025-01-27 LAB
ALBUMIN SERPL ELPH-MCNC: 4.3 G/DL — SIGNIFICANT CHANGE UP (ref 3.3–5)
ALP SERPL-CCNC: 65 U/L — SIGNIFICANT CHANGE UP (ref 40–120)
ALT FLD-CCNC: 73 U/L — HIGH (ref 10–45)
AMMONIA BLD-MCNC: 24 UMOL/L — SIGNIFICANT CHANGE UP (ref 11–55)
ANION GAP SERPL CALC-SCNC: 12 MMOL/L — SIGNIFICANT CHANGE UP (ref 5–17)
APPEARANCE UR: CLEAR — SIGNIFICANT CHANGE UP
APTT BLD: 28.7 SEC — SIGNIFICANT CHANGE UP (ref 24.5–35.6)
AST SERPL-CCNC: 51 U/L — HIGH (ref 10–40)
B-OH-BUTYR SERPL-SCNC: 0.3 MMOL/L — SIGNIFICANT CHANGE UP
BASOPHILS # BLD AUTO: 0.03 K/UL — SIGNIFICANT CHANGE UP (ref 0–0.2)
BASOPHILS NFR BLD AUTO: 0.5 % — SIGNIFICANT CHANGE UP (ref 0–2)
BILIRUB SERPL-MCNC: 0.3 MG/DL — SIGNIFICANT CHANGE UP (ref 0.2–1.2)
BILIRUB UR-MCNC: NEGATIVE — SIGNIFICANT CHANGE UP
BUN SERPL-MCNC: 16 MG/DL — SIGNIFICANT CHANGE UP (ref 7–23)
CALCIUM SERPL-MCNC: 10.2 MG/DL — SIGNIFICANT CHANGE UP (ref 8.4–10.5)
CHLORIDE SERPL-SCNC: 102 MMOL/L — SIGNIFICANT CHANGE UP (ref 96–108)
CK MB CFR SERPL CALC: 3.9 NG/ML — SIGNIFICANT CHANGE UP (ref 0–6.7)
CO2 SERPL-SCNC: 24 MMOL/L — SIGNIFICANT CHANGE UP (ref 22–31)
COLOR SPEC: YELLOW — SIGNIFICANT CHANGE UP
CREAT SERPL-MCNC: 1.15 MG/DL — SIGNIFICANT CHANGE UP (ref 0.5–1.3)
DIFF PNL FLD: NEGATIVE — SIGNIFICANT CHANGE UP
EGFR: 76 ML/MIN/1.73M2 — SIGNIFICANT CHANGE UP
EOSINOPHIL # BLD AUTO: 0.05 K/UL — SIGNIFICANT CHANGE UP (ref 0–0.5)
EOSINOPHIL NFR BLD AUTO: 0.8 % — SIGNIFICANT CHANGE UP (ref 0–6)
FLUAV AG NPH QL: SIGNIFICANT CHANGE UP
FLUBV AG NPH QL: SIGNIFICANT CHANGE UP
GAS PNL BLDV: SIGNIFICANT CHANGE UP
GLUCOSE SERPL-MCNC: 95 MG/DL — SIGNIFICANT CHANGE UP (ref 70–99)
GLUCOSE UR QL: NEGATIVE MG/DL — SIGNIFICANT CHANGE UP
HCT VFR BLD CALC: 40.1 % — SIGNIFICANT CHANGE UP (ref 39–50)
HGB BLD-MCNC: 13.1 G/DL — SIGNIFICANT CHANGE UP (ref 13–17)
IMM GRANULOCYTES NFR BLD AUTO: 0.2 % — SIGNIFICANT CHANGE UP (ref 0–0.9)
INR BLD: 1.11 — SIGNIFICANT CHANGE UP (ref 0.85–1.16)
KETONES UR-MCNC: ABNORMAL MG/DL
LACTATE SERPL-SCNC: 0.8 MMOL/L — SIGNIFICANT CHANGE UP (ref 0.5–2)
LEUKOCYTE ESTERASE UR-ACNC: NEGATIVE — SIGNIFICANT CHANGE UP
LIDOCAIN IGE QN: 38 U/L — SIGNIFICANT CHANGE UP (ref 7–60)
LYMPHOCYTES # BLD AUTO: 3.16 K/UL — SIGNIFICANT CHANGE UP (ref 1–3.3)
LYMPHOCYTES # BLD AUTO: 51 % — HIGH (ref 13–44)
MAGNESIUM SERPL-MCNC: 1.9 MG/DL — SIGNIFICANT CHANGE UP (ref 1.6–2.6)
MCHC RBC-ENTMCNC: 28.1 PG — SIGNIFICANT CHANGE UP (ref 27–34)
MCHC RBC-ENTMCNC: 32.7 G/DL — SIGNIFICANT CHANGE UP (ref 32–36)
MCV RBC AUTO: 85.9 FL — SIGNIFICANT CHANGE UP (ref 80–100)
MONOCYTES # BLD AUTO: 0.49 K/UL — SIGNIFICANT CHANGE UP (ref 0–0.9)
MONOCYTES NFR BLD AUTO: 7.9 % — SIGNIFICANT CHANGE UP (ref 2–14)
NEUTROPHILS # BLD AUTO: 2.46 K/UL — SIGNIFICANT CHANGE UP (ref 1.8–7.4)
NEUTROPHILS NFR BLD AUTO: 39.6 % — LOW (ref 43–77)
NITRITE UR-MCNC: NEGATIVE — SIGNIFICANT CHANGE UP
NRBC # BLD: 0 /100 WBCS — SIGNIFICANT CHANGE UP (ref 0–0)
PH UR: 5 — SIGNIFICANT CHANGE UP (ref 5–8)
PHOSPHATE SERPL-MCNC: 3.3 MG/DL — SIGNIFICANT CHANGE UP (ref 2.5–4.5)
PLATELET # BLD AUTO: 252 K/UL — SIGNIFICANT CHANGE UP (ref 150–400)
POTASSIUM SERPL-MCNC: 4 MMOL/L — SIGNIFICANT CHANGE UP (ref 3.5–5.3)
POTASSIUM SERPL-SCNC: 4 MMOL/L — SIGNIFICANT CHANGE UP (ref 3.5–5.3)
PROT SERPL-MCNC: 8.6 G/DL — HIGH (ref 6–8.3)
PROT UR-MCNC: NEGATIVE MG/DL — SIGNIFICANT CHANGE UP
PROTHROM AB SERPL-ACNC: 13 SEC — SIGNIFICANT CHANGE UP (ref 9.9–13.4)
RBC # BLD: 4.67 M/UL — SIGNIFICANT CHANGE UP (ref 4.2–5.8)
RBC # FLD: 13.9 % — SIGNIFICANT CHANGE UP (ref 10.3–14.5)
RSV RNA NPH QL NAA+NON-PROBE: SIGNIFICANT CHANGE UP
SARS-COV-2 RNA SPEC QL NAA+PROBE: SIGNIFICANT CHANGE UP
SODIUM SERPL-SCNC: 138 MMOL/L — SIGNIFICANT CHANGE UP (ref 135–145)
SP GR SPEC: 1.03 — SIGNIFICANT CHANGE UP (ref 1–1.03)
TROPONIN T, HIGH SENSITIVITY RESULT: <6 NG/L — SIGNIFICANT CHANGE UP (ref 0–51)
UROBILINOGEN FLD QL: 0.2 MG/DL — SIGNIFICANT CHANGE UP (ref 0.2–1)
WBC # BLD: 6.2 K/UL — SIGNIFICANT CHANGE UP (ref 3.8–10.5)
WBC # FLD AUTO: 6.2 K/UL — SIGNIFICANT CHANGE UP (ref 3.8–10.5)

## 2025-01-27 PROCEDURE — 82550 ASSAY OF CK (CPK): CPT

## 2025-01-27 PROCEDURE — 36415 COLL VENOUS BLD VENIPUNCTURE: CPT

## 2025-01-27 PROCEDURE — 87637 SARSCOV2&INF A&B&RSV AMP PRB: CPT

## 2025-01-27 PROCEDURE — 85025 COMPLETE CBC W/AUTO DIFF WBC: CPT

## 2025-01-27 PROCEDURE — 70450 CT HEAD/BRAIN W/O DYE: CPT | Mod: MC

## 2025-01-27 PROCEDURE — 82962 GLUCOSE BLOOD TEST: CPT

## 2025-01-27 PROCEDURE — 71045 X-RAY EXAM CHEST 1 VIEW: CPT | Mod: 26

## 2025-01-27 PROCEDURE — 84484 ASSAY OF TROPONIN QUANT: CPT

## 2025-01-27 PROCEDURE — 84295 ASSAY OF SERUM SODIUM: CPT

## 2025-01-27 PROCEDURE — 84100 ASSAY OF PHOSPHORUS: CPT

## 2025-01-27 PROCEDURE — 99285 EMERGENCY DEPT VISIT HI MDM: CPT

## 2025-01-27 PROCEDURE — 85730 THROMBOPLASTIN TIME PARTIAL: CPT

## 2025-01-27 PROCEDURE — 82803 BLOOD GASES ANY COMBINATION: CPT

## 2025-01-27 PROCEDURE — 70498 CT ANGIOGRAPHY NECK: CPT | Mod: 26

## 2025-01-27 PROCEDURE — 82553 CREATINE MB FRACTION: CPT

## 2025-01-27 PROCEDURE — 85610 PROTHROMBIN TIME: CPT

## 2025-01-27 PROCEDURE — 93010 ELECTROCARDIOGRAM REPORT: CPT

## 2025-01-27 PROCEDURE — 82010 KETONE BODYS QUAN: CPT

## 2025-01-27 PROCEDURE — 84132 ASSAY OF SERUM POTASSIUM: CPT

## 2025-01-27 PROCEDURE — 70496 CT ANGIOGRAPHY HEAD: CPT | Mod: MC

## 2025-01-27 PROCEDURE — 71045 X-RAY EXAM CHEST 1 VIEW: CPT

## 2025-01-27 PROCEDURE — 70450 CT HEAD/BRAIN W/O DYE: CPT | Mod: 26,59

## 2025-01-27 PROCEDURE — 82330 ASSAY OF CALCIUM: CPT

## 2025-01-27 PROCEDURE — 83690 ASSAY OF LIPASE: CPT

## 2025-01-27 PROCEDURE — 70498 CT ANGIOGRAPHY NECK: CPT | Mod: MC

## 2025-01-27 PROCEDURE — 93005 ELECTROCARDIOGRAM TRACING: CPT

## 2025-01-27 PROCEDURE — 87086 URINE CULTURE/COLONY COUNT: CPT

## 2025-01-27 PROCEDURE — 82140 ASSAY OF AMMONIA: CPT

## 2025-01-27 PROCEDURE — 99285 EMERGENCY DEPT VISIT HI MDM: CPT | Mod: 25

## 2025-01-27 PROCEDURE — 83605 ASSAY OF LACTIC ACID: CPT

## 2025-01-27 PROCEDURE — 81003 URINALYSIS AUTO W/O SCOPE: CPT

## 2025-01-27 PROCEDURE — 0241U: CPT

## 2025-01-27 PROCEDURE — 83735 ASSAY OF MAGNESIUM: CPT

## 2025-01-27 PROCEDURE — 80053 COMPREHEN METABOLIC PANEL: CPT

## 2025-01-27 PROCEDURE — 70496 CT ANGIOGRAPHY HEAD: CPT | Mod: 26

## 2025-01-27 RX ORDER — SODIUM CHLORIDE 9 MG/ML
1000 INJECTION, SOLUTION INTRAMUSCULAR; INTRAVENOUS; SUBCUTANEOUS ONCE
Refills: 0 | Status: COMPLETED | OUTPATIENT
Start: 2025-01-27 | End: 2025-01-27

## 2025-01-27 RX ADMIN — SODIUM CHLORIDE 1000 MILLILITER(S): 9 INJECTION, SOLUTION INTRAMUSCULAR; INTRAVENOUS; SUBCUTANEOUS at 21:27

## 2025-01-27 NOTE — ED PROVIDER NOTE - ATTENDING APP SHARED VISIT CONTRIBUTION OF CARE
i discussed the care of the pt directly with the ACP while the pt was in the ED. i have reviewed the ACP note and agree w/ the history, exam and plan of care other than as noted above.     54-year-old with history of liver cirrhosis, reports he is on lactulose for high ammonia and has a history of prior alcohol and cocaine abuse, last use December 30, reports he has a appointment with a liver transplant specialist  in April,  over the last 3 to 4 days reports feeling off and somewhat lightheaded, was on the train prior to arrival and felt a dizzy sensation, felt worse with sitting and better with standing

## 2025-01-27 NOTE — ED PROVIDER NOTE - WET READ LAUNCH FT
Glaucoma suspect OD -  Increased IOP Start Latanoprost OD QHS. There are no Wet Read(s) to document.

## 2025-01-27 NOTE — ED PROVIDER NOTE - NSFOLLOWUPINSTRUCTIONS_ED_ALL_ED_FT
Drink plenty of fluids, get plenty of rest.   Continue all medications as previously prescribed.     Follow up with your primary care doctor within 1 week for continued evaluation. .     Return to the Emergency Department for persistent, worsening or new symptoms including severe chest pain, shortness of breath, difficulty breathing, nausea/vomiting, excessive sweating, or any other serious concerns.

## 2025-01-27 NOTE — ED PROVIDER NOTE - ENMT, MLM
Airway patent, Nasal mucosa clear. Mouth with  dry mucosa. Throat has no vesicles, no oropharyngeal exudates and uvula is midline.

## 2025-01-27 NOTE — ED PROVIDER NOTE - PATIENT PORTAL LINK FT
You can access the FollowMyHealth Patient Portal offered by [facility] by registering at the following website: [email or web address]. By joining [employer]’s FollowMyHealth portal, you will also be able to view your health information using other applications (apps) compatible with our system.

## 2025-01-27 NOTE — ED ADULT NURSE NOTE - CAS TRG GEN SKIN CONDITION
Patient needs his HYDROcodone-acetaminophen (NORCO) 5-325 MG tablet sent to [facility] [id number] [facility] - [facility], [place] - [address]  Ebnoy Barreto  Team Vivek,     
Warm

## 2025-01-27 NOTE — ED ADULT TRIAGE NOTE - CHIEF COMPLAINT QUOTE
Patient brought in by EMS c/o dizziness. States tarted today at ~4pm with some blurry vision. States has been happening intermittently for months. Reports generalized weakness for months. No numbness/tingling/slurred speech.

## 2025-01-27 NOTE — ED PROVIDER NOTE - OBJECTIVE STATEMENT
54-year-old with history of liver cirrhosis, reports he is on lactulose for high ammonia and has a history of prior alcohol and cocaine abuse, last use December 30, reports he has a appointment with a liver transplant specialist  in April,  over the last 3 to 4 days reports feeling off and somewhat lightheaded, was on the train prior to arrival and felt a dizzy sensation, felt worse with sitting and better with standing and felt hot, felt he needed to urgently get off of the train and called 911 and came to the hospital, no numbness, no focal weakness although feeling tired and feels that he is confused, feels that he may have high ammonia, denies drugs or alcohol, no fever, no headache, no vision change, no chest pain no palpitations no nausea vomiting or diarrhea, no urinary symptoms, reports he just feels off,

## 2025-01-27 NOTE — ED PROVIDER NOTE - CONSTITUTIONAL, MLM
Well appearing, awake, alert, oriented to person, place, time/situation and in no apparent distress., obese normal...

## 2025-01-27 NOTE — ED ADULT NURSE NOTE - DRUG PRE-SCREENING (DAST -1)
Statement Selected
Vaccine Information Sheet (VIS) provided-VIS date: 8/07/15/Risks/benefits discussed with patient/surrogate

## 2025-01-27 NOTE — ED PROVIDER NOTE - PROGRESS NOTE DETAILS
fallon - received on sign out pending CT and CTA results. anticipate dc.   wokrup thus far unremarkable  ct head and cta w/o acute findings.   resting comfortably, stable vitals, remains neuro intact. ok for dc and outpt follow up    All results reviewed with the patient verbally. Discharge plan and return precautions d/w pt who verbalized understanding and agrees with plan. All questions answered. Vitals WNL. Ready for d/c.

## 2025-01-27 NOTE — ED ADULT NURSE NOTE - OBJECTIVE STATEMENT
Pt alert and oriented on arrival. Reports intermittent episodes of lightheadedness/weakness/imbalance x several months. Reports feeling lightheaded, flushed, and like he was about to pass out while riding the train. BEFAST (-) at time of assessment. Denies chest pain or SOB. Denies fevers or chills.

## 2025-01-27 NOTE — ED PROVIDER NOTE - CPE EDP PSYCH NORM
Our direct number given to return our call for appointment instructions for appointment on 6/7/19.    normal...

## 2025-01-27 NOTE — ED PROVIDER NOTE - NEUROLOGICAL, MLM
Alert and oriented, no focal deficits, no motor or sensory deficits. CN'II-XII intact, no pronator drift, nl finger to nose, clear speech, gait intact , able to walk straight line witthout wobble, nl finger to nose

## 2025-01-29 LAB
CULTURE RESULTS: SIGNIFICANT CHANGE UP
SPECIMEN SOURCE: SIGNIFICANT CHANGE UP